# Patient Record
Sex: FEMALE | Race: WHITE | NOT HISPANIC OR LATINO | Employment: OTHER | URBAN - METROPOLITAN AREA
[De-identification: names, ages, dates, MRNs, and addresses within clinical notes are randomized per-mention and may not be internally consistent; named-entity substitution may affect disease eponyms.]

---

## 2017-01-24 ENCOUNTER — OFFICE VISIT (OUTPATIENT)
Dept: FAMILY MEDICINE CLINIC | Facility: CLINIC | Age: 77
End: 2017-01-24

## 2017-01-24 VITALS
SYSTOLIC BLOOD PRESSURE: 142 MMHG | WEIGHT: 109.2 LBS | RESPIRATION RATE: 24 BRPM | BODY MASS INDEX: 21.33 KG/M2 | TEMPERATURE: 97.1 F | DIASTOLIC BLOOD PRESSURE: 70 MMHG | HEART RATE: 88 BPM

## 2017-01-24 DIAGNOSIS — G47.00 INSOMNIA, UNSPECIFIED TYPE: ICD-10-CM

## 2017-01-24 DIAGNOSIS — J44.9 CHRONIC OBSTRUCTIVE PULMONARY DISEASE, UNSPECIFIED COPD TYPE (HCC): Primary | ICD-10-CM

## 2017-01-24 DIAGNOSIS — R53.83 OTHER FATIGUE: ICD-10-CM

## 2017-01-24 PROCEDURE — 99213 OFFICE O/P EST LOW 20 MIN: CPT | Performed by: NURSE PRACTITIONER

## 2017-01-24 RX ORDER — ALBUTEROL SULFATE 90 UG/1
2 AEROSOL, METERED RESPIRATORY (INHALATION) EVERY 4 HOURS PRN
Qty: 18 G | Refills: 11 | Status: SHIPPED | OUTPATIENT
Start: 2017-01-24 | End: 2017-04-11 | Stop reason: SDUPTHER

## 2017-01-24 NOTE — PROGRESS NOTES
Subjective   Shirley Calhoun is a 76 y.o. female.     History of Present Illness     Trying to quit smoking doing some vapor cigarettes    Needs something different for insomnia; Trazodone not helping anymore, wants to try Ambien, says she has taken it in the past.    Says she has hx of ADD  Used to take Ritalin in the past for many years but has been off of it for about 8 years  Having trouble getting things done around the house, trouble focusing    The following portions of the patient's history were reviewed and updated as appropriate: allergies, current medications, past family history, past medical history, past social history, past surgical history and problem list.    Review of Systems   Constitutional: Positive for fatigue.   HENT: Negative.    Eyes: Negative.    Respiratory: Positive for cough.    Cardiovascular: Negative.    Gastrointestinal: Negative.    Endocrine: Negative.    Genitourinary: Negative.    Musculoskeletal: Negative.    Skin: Negative.    Allergic/Immunologic: Negative.    Neurological: Negative.    Hematological: Negative.    Psychiatric/Behavioral: Positive for decreased concentration and sleep disturbance.       Objective   Physical Exam   Constitutional: She is oriented to person, place, and time. She appears well-developed and well-nourished.   HENT:   Head: Normocephalic.   Right Ear: External ear normal.   Left Ear: External ear normal.   Nose: Nose normal.   Mouth/Throat: Oropharynx is clear and moist.   Neck: Normal range of motion. Neck supple. No thyromegaly present.   Cardiovascular: Normal rate, regular rhythm and normal heart sounds.    Pulmonary/Chest: Effort normal and breath sounds normal. No respiratory distress. She has no wheezes.   Abdominal: Soft.   Lymphadenopathy:     She has no cervical adenopathy.   Neurological: She is alert and oriented to person, place, and time.   Skin: Skin is warm and dry.   Psychiatric: She has a normal mood and affect.   Nursing note and  vitals reviewed.      Assessment/Plan   Shirley was seen today for copd, insomnia and fatigue.    Diagnoses and all orders for this visit:    Chronic obstructive pulmonary disease, unspecified COPD type    Other fatigue    Insomnia, unspecified type    Other orders  -     albuterol (PROAIR HFA) 108 (90 BASE) MCG/ACT inhaler; Inhale 2 puffs Every 4 (Four) Hours As Needed for wheezing.      Gave pt samples of Dulera 100mg ii puffs twice daily since she does not have any prescription coverage.   Encouraged her to continue to pursue smoking cessation, but I do not know the long term health risks associated with vaporized nicotine devices. Cautioned her not to spill the liquid nicotine on her as she can get a large dose absorbed into her skin and keep it out of reach of small children.    Discussed with pt that she is not really a candidate for Ritalin since she is not working she is retired and not in school, we did discuss Straterra as an alternative but I feel like her sleep is more of a focus that should be addressed at this time. She says she wants to stop Trazodone and start Ambien which she has taken in the past. She has several pills in her purse so she will try these. If they help with sleep will get refills. If not will consider Amitryptylline 10mg or Mirtazapine 15mg to see if this will help her sleep.  She agrees.

## 2017-01-24 NOTE — MR AVS SNAPSHOT
Shirley Calhoun   1/24/2017 12:30 PM   Office Visit    Dept Phone:  [phone]   Encounter #:  [id number]    Provider:  LORNA Dwyer   Department:  [facility]                Your Full Care Plan              Today's Medication Changes          These changes are accurate as of: 1/24/17  1:13 PM.  If you have any questions, ask your nurse or doctor.               Stop taking medication(s)listed here:     nitrofurantoin (macrocrystal-monohydrate) 100 MG capsule   Commonly known as:  MACROBID   Stopped by:  LORNA Dwyer                Where to Get Your Medications      These medications were sent to [facility] [address] [place] [address] - [facility] AT [facility] - [phone]  - [phone] [address] [address]     Phone:  [phone]     albuterol 108 (90 BASE) MCG/ACT inhaler                  Your Updated Medication List          This list is accurate as of: 1/24/17  1:13 PM.  Always use your most recent med list.                albuterol 108 (90 BASE) MCG/ACT inhaler   Commonly known as:  PROAIR HFA   Inhale 2 puffs Every 4 (Four) Hours As Needed for wheezing.       ALEVE 220 MG tablet   Generic drug:  naproxen sodium       aspirin 81 MG tablet       Calcium 600-400 MG-UNIT chewable tablet       DULoxetine 60 MG capsule   Commonly known as:  CYMBALTA   TAKE 1 CAPSULE BY MOUTH DAILY AT BEDTIME       SYMBICORT 160-4.5 MCG/ACT inhaler   Generic drug:  budesonide-formoterol       traZODone 150 MG tablet   Commonly known as:  DESYREL   TAKE 1 AND 1/2 TABLETS BY MOUTH BEDTIME               You Were Diagnosed With        Codes Comments    Chronic obstructive pulmonary disease, unspecified COPD type    -  Primary ICD-10-CM: J44.9  ICD-9-CM: 496       Instructions     None    Patient Instructions History      Upcoming Appointments     Visit Type Date Time Department    OFFICE VISIT 1/24/2017 12:30 PM  JESUS LILLY [facility]      [employer] Signup     [facility] [employer] allows you to send messages to your doctor, view your test results, renew your prescriptions, schedule appointments, and more. To sign up, go to [employer] and click on the Sign Up Now link in the New User? box. Enter your [employer] Activation Code exactly as it appears below along with the last four digits of your Social Security Number and your Date of Birth () to complete the sign-up process. If you do not sign up before the expiration date, you must request a new code.    [employer] Activation Code: [email or web address]  Expires: 2017  1:13 PM    If you have questions, you can email [email or web address] or call [phone] to talk to our [employer] staff. Remember, [employer] is NOT to be used for urgent needs. For medical emergencies, dial 911.               Other Info from Your Visit           Allergies     No Known Allergies      Reason for Visit     COPD F/U and medication refill    Insomnia The sleeping pills aren't helping.     Fatigue           Vital Signs     Blood Pressure Pulse Temperature Respirations Weight Body Mass Index    142/70 88 97.1 °F (36.2 °C) 24 109 lb 3.2 oz (49.5 kg) 21.33 kg/m2    Smoking Status                   Current Every Day Smoker           Problems and Diagnoses Noted     Chronic airway obstruction

## 2017-02-22 DIAGNOSIS — G47.00 INSOMNIA: ICD-10-CM

## 2017-02-22 RX ORDER — TRAZODONE HYDROCHLORIDE 150 MG/1
TABLET ORAL
Qty: 45 TABLET | Refills: 5 | Status: SHIPPED | OUTPATIENT
Start: 2017-02-22 | End: 2017-04-11

## 2017-03-20 RX ORDER — DULOXETIN HYDROCHLORIDE 60 MG/1
CAPSULE, DELAYED RELEASE ORAL
Qty: 30 CAPSULE | Refills: 0 | Status: SHIPPED | OUTPATIENT
Start: 2017-03-20 | End: 2017-04-25

## 2017-04-11 ENCOUNTER — OFFICE VISIT (OUTPATIENT)
Dept: FAMILY MEDICINE CLINIC | Facility: CLINIC | Age: 77
End: 2017-04-11

## 2017-04-11 VITALS
BODY MASS INDEX: 21.4 KG/M2 | OXYGEN SATURATION: 94 % | WEIGHT: 109 LBS | HEART RATE: 84 BPM | SYSTOLIC BLOOD PRESSURE: 132 MMHG | RESPIRATION RATE: 18 BRPM | DIASTOLIC BLOOD PRESSURE: 68 MMHG | TEMPERATURE: 97.9 F | HEIGHT: 60 IN

## 2017-04-11 DIAGNOSIS — F51.01 PRIMARY INSOMNIA: Primary | ICD-10-CM

## 2017-04-11 PROCEDURE — 99213 OFFICE O/P EST LOW 20 MIN: CPT | Performed by: NURSE PRACTITIONER

## 2017-04-11 RX ORDER — ALBUTEROL SULFATE 90 UG/1
2 AEROSOL, METERED RESPIRATORY (INHALATION) EVERY 4 HOURS PRN
Qty: 18 G | Refills: 11 | Status: SHIPPED | OUTPATIENT
Start: 2017-04-11 | End: 2018-03-01 | Stop reason: SDUPTHER

## 2017-04-11 RX ORDER — MIRTAZAPINE 15 MG/1
15 TABLET, FILM COATED ORAL NIGHTLY
Qty: 30 TABLET | Refills: 3 | Status: SHIPPED | OUTPATIENT
Start: 2017-04-11 | End: 2017-04-25

## 2017-04-11 NOTE — PROGRESS NOTES
Subjective   Shirley Calhoun is a 77 y.o. female.     History of Present Illness   Insomnia  Not sleeping well, taking Trazodone nightly sometimes takes 1.5 tablets to help her stay asleep  Did not tolerate Ambien, made her crazy    Feeling anxious about having right hip replacement surgery May 1  Doesn't feel like Duloxetine is helping her mood  Feels better when the sun is shining    The following portions of the patient's history were reviewed and updated as appropriate: allergies, current medications, past family history, past medical history, past social history, past surgical history and problem list.    Review of Systems   Constitutional: Negative.    HENT: Negative.    Eyes: Negative.    Respiratory: Positive for cough, chest tightness and shortness of breath. Negative for wheezing.    Cardiovascular: Negative.  Negative for chest pain, palpitations and leg swelling.   Gastrointestinal: Negative.    Endocrine: Negative.    Genitourinary: Negative.    Musculoskeletal: Negative.    Skin: Negative.    Allergic/Immunologic: Negative.    Neurological: Negative.    Hematological: Negative.    Psychiatric/Behavioral: Positive for dysphoric mood and sleep disturbance. Negative for suicidal ideas. The patient is nervous/anxious.        Objective   Physical Exam   Constitutional: She is oriented to person, place, and time. Vital signs are normal. She appears well-developed and well-nourished. No distress.   HENT:   Head: Normocephalic.   Right Ear: Tympanic membrane, external ear and ear canal normal.   Left Ear: Tympanic membrane, external ear and ear canal normal.   Nose: Nose normal. No mucosal edema or rhinorrhea. Right sinus exhibits no maxillary sinus tenderness and no frontal sinus tenderness. Left sinus exhibits no maxillary sinus tenderness and no frontal sinus tenderness.   Mouth/Throat: Uvula is midline and mucous membranes are normal.   Eyes: Conjunctivae and lids are normal. Pupils are equal, round, and  reactive to light.   Neck: Trachea normal and normal range of motion. Neck supple. Carotid bruit is not present. No thyroid mass present.   Cardiovascular: Normal rate, regular rhythm, S1 normal, S2 normal and normal heart sounds.    Pulmonary/Chest: Effort normal and breath sounds normal.   Abdominal: Normal appearance.   Musculoskeletal: Normal range of motion.   Lymphadenopathy:        Head (right side): No tonsillar, no preauricular, no posterior auricular and no occipital adenopathy present.        Head (left side): No tonsillar, no preauricular, no posterior auricular and no occipital adenopathy present.   Neurological: She is alert and oriented to person, place, and time.   Skin: Skin is warm, dry and intact. No cyanosis. Nails show no clubbing.   Psychiatric: She has a normal mood and affect. Her speech is normal and behavior is normal. Judgment and thought content normal. Cognition and memory are normal.   Nursing note and vitals reviewed.      Assessment/Plan   Shirley was seen today for breathing problem.    Diagnoses and all orders for this visit:    Primary insomnia    Other orders  -     mirtazapine (REMERON) 15 MG tablet; Take 1 tablet by mouth Every Night.  -     Fluticasone Furoate-Vilanterol 100-25 MCG/INH aerosol powder ; Inhale 1 puff Daily.  -     albuterol (PROAIR HFA) 108 (90 BASE) MCG/ACT inhaler; Inhale 2 puffs Every 4 (Four) Hours As Needed for Wheezing.        Discontinue Trazodone and start Remeron for sleep  Do not have anymore Symbicort therefore will give pt Breo sample  Use rescue inhaler Proair when wheezing  Follow up as needed

## 2017-04-13 ENCOUNTER — OFFICE VISIT (OUTPATIENT)
Dept: FAMILY MEDICINE CLINIC | Facility: CLINIC | Age: 77
End: 2017-04-13

## 2017-04-13 VITALS
WEIGHT: 107.8 LBS | DIASTOLIC BLOOD PRESSURE: 65 MMHG | HEIGHT: 60 IN | TEMPERATURE: 98 F | BODY MASS INDEX: 21.17 KG/M2 | SYSTOLIC BLOOD PRESSURE: 118 MMHG | HEART RATE: 88 BPM | RESPIRATION RATE: 16 BRPM

## 2017-04-13 DIAGNOSIS — K59.00 CONSTIPATION, UNSPECIFIED CONSTIPATION TYPE: Primary | ICD-10-CM

## 2017-04-13 PROCEDURE — 99213 OFFICE O/P EST LOW 20 MIN: CPT | Performed by: NURSE PRACTITIONER

## 2017-04-13 NOTE — PROGRESS NOTES
Subjective   Shirley Calhoun is a 77 y.o. female.     History of Present Illness   Having issues with abdominal bloating  Having trouble having a BM this morning, + constipation  Took some Miralax this morning but just a small amount  + hx of bowel obstruction many years ago  No abdominal pain, no blood in stool, no nausea or vomiting    Feeling anxious about hip surgery that is coming up in several weeks, she is worried she is gaining too much weight  She denies shortness of breath, no swelling in ankles, feet hands or face, no CP    The following portions of the patient's history were reviewed and updated as appropriate: allergies, current medications, past family history, past medical history, past social history, past surgical history and problem list.    Review of Systems   Constitutional: Negative.    HENT: Negative.    Eyes: Negative.    Respiratory: Negative.    Cardiovascular: Negative.    Gastrointestinal: Positive for abdominal distention. Negative for abdominal pain, blood in stool, constipation, diarrhea, nausea, rectal pain and vomiting.   Endocrine: Negative.    Genitourinary: Negative.    Musculoskeletal: Negative.    Skin: Negative.    Allergic/Immunologic: Negative.    Neurological: Negative.    Hematological: Negative.    Psychiatric/Behavioral: Negative.        Objective   Physical Exam   Constitutional: She is oriented to person, place, and time. Vital signs are normal. She appears well-developed and well-nourished. No distress.   HENT:   Head: Normocephalic.   Right Ear: Tympanic membrane, external ear and ear canal normal.   Left Ear: Tympanic membrane, external ear and ear canal normal.   Nose: Nose normal. No mucosal edema or rhinorrhea. Right sinus exhibits no maxillary sinus tenderness and no frontal sinus tenderness. Left sinus exhibits no maxillary sinus tenderness and no frontal sinus tenderness.   Mouth/Throat: Uvula is midline and mucous membranes are normal.   Eyes: Lids are normal.    Neck: Trachea normal. Carotid bruit is not present. No thyroid mass present.   Cardiovascular: Normal rate, regular rhythm, S1 normal, S2 normal and normal heart sounds.    Pulmonary/Chest: Effort normal and breath sounds normal.   Abdominal: Soft. Normal appearance and bowel sounds are normal. There is no tenderness. There is no rebound and no guarding.   Musculoskeletal: Normal range of motion.   Lymphadenopathy:        Head (right side): No tonsillar, no preauricular, no posterior auricular and no occipital adenopathy present.        Head (left side): No tonsillar, no preauricular, no posterior auricular and no occipital adenopathy present.   Neurological: She is alert and oriented to person, place, and time. She has normal reflexes.   Skin: Skin is warm, dry and intact. No cyanosis. Nails show no clubbing.   Psychiatric: She has a normal mood and affect. Her speech is normal and behavior is normal. Judgment and thought content normal. Cognition and memory are normal.   Nursing note and vitals reviewed.      Assessment/Plan   Shirley was seen today for pt concerned about weight gain & bloating.    Diagnoses and all orders for this visit:    Constipation, unspecified constipation type    Recommend drinking plenty of water, Miralax OTC for constipation and prune juice  If abdominal pain, N/V or blood in stool go to ER for further evaluation; pt agrees

## 2017-04-25 ENCOUNTER — HOSPITAL ENCOUNTER (OUTPATIENT)
Dept: GENERAL RADIOLOGY | Facility: HOSPITAL | Age: 77
Discharge: HOME | End: 2017-04-25
Admitting: ORTHOPAEDIC SURGERY

## 2017-04-25 ENCOUNTER — APPOINTMENT (OUTPATIENT)
Dept: PREADMISSION TESTING | Facility: HOSPITAL | Age: 77
End: 2017-04-25

## 2017-04-25 VITALS — WEIGHT: 104.94 LBS | BODY MASS INDEX: 20.6 KG/M2 | HEIGHT: 60 IN

## 2017-04-25 DIAGNOSIS — Z01.89 LABORATORY TEST: Primary | ICD-10-CM

## 2017-04-25 LAB
ABO GROUP BLD: NORMAL
ALBUMIN SERPL-MCNC: 4.3 G/DL (ref 3.2–4.8)
ALBUMIN/GLOB SERPL: 1.5 G/DL (ref 1.5–2.5)
ALP SERPL-CCNC: 56 U/L (ref 25–100)
ALT SERPL W P-5'-P-CCNC: 12 U/L (ref 7–40)
ANION GAP SERPL CALCULATED.3IONS-SCNC: 7 MMOL/L (ref 3–11)
AST SERPL-CCNC: 24 U/L (ref 0–33)
BILIRUB SERPL-MCNC: 0.6 MG/DL (ref 0.3–1.2)
BUN BLD-MCNC: 21 MG/DL (ref 9–23)
BUN/CREAT SERPL: 35 (ref 7–25)
CALCIUM SPEC-SCNC: 9.9 MG/DL (ref 8.7–10.4)
CHLORIDE SERPL-SCNC: 103 MMOL/L (ref 99–109)
CO2 SERPL-SCNC: 29 MMOL/L (ref 20–31)
CREAT BLD-MCNC: 0.6 MG/DL (ref 0.6–1.3)
DEPRECATED RDW RBC AUTO: 47 FL (ref 37–54)
ERYTHROCYTE [DISTWIDTH] IN BLOOD BY AUTOMATED COUNT: 12.7 % (ref 11.3–14.5)
GFR SERPL CREATININE-BSD FRML MDRD: 97 ML/MIN/1.73
GLOBULIN UR ELPH-MCNC: 2.8 GM/DL
GLUCOSE BLD-MCNC: 92 MG/DL (ref 70–100)
HBA1C MFR BLD: 5 % (ref 4.8–5.6)
HCT VFR BLD AUTO: 39.6 % (ref 34.5–44)
HGB BLD-MCNC: 13.5 G/DL (ref 11.5–15.5)
MCH RBC QN AUTO: 34.7 PG (ref 27–31)
MCHC RBC AUTO-ENTMCNC: 34.1 G/DL (ref 32–36)
MCV RBC AUTO: 101.8 FL (ref 80–99)
PLATELET # BLD AUTO: 285 10*3/MM3 (ref 150–450)
PMV BLD AUTO: 9.6 FL (ref 6–12)
POTASSIUM BLD-SCNC: 4.1 MMOL/L (ref 3.5–5.5)
PROT SERPL-MCNC: 7.1 G/DL (ref 5.7–8.2)
RBC # BLD AUTO: 3.89 10*6/MM3 (ref 3.89–5.14)
RH BLD: POSITIVE
SODIUM BLD-SCNC: 139 MMOL/L (ref 132–146)
WBC NRBC COR # BLD: 6.63 10*3/MM3 (ref 3.5–10.8)

## 2017-04-25 PROCEDURE — 80053 COMPREHEN METABOLIC PANEL: CPT | Performed by: ORTHOPAEDIC SURGERY

## 2017-04-25 PROCEDURE — 85027 COMPLETE CBC AUTOMATED: CPT | Performed by: ORTHOPAEDIC SURGERY

## 2017-04-25 PROCEDURE — 93005 ELECTROCARDIOGRAM TRACING: CPT

## 2017-04-25 PROCEDURE — 86900 BLOOD TYPING SEROLOGIC ABO: CPT

## 2017-04-25 PROCEDURE — 71020 HC CHEST PA AND LATERAL: CPT

## 2017-04-25 PROCEDURE — 83036 HEMOGLOBIN GLYCOSYLATED A1C: CPT | Performed by: ORTHOPAEDIC SURGERY

## 2017-04-25 PROCEDURE — 93010 ELECTROCARDIOGRAM REPORT: CPT | Performed by: INTERNAL MEDICINE

## 2017-04-25 PROCEDURE — 86901 BLOOD TYPING SEROLOGIC RH(D): CPT

## 2017-04-25 PROCEDURE — 36415 COLL VENOUS BLD VENIPUNCTURE: CPT

## 2017-04-25 RX ORDER — UBIDECARENONE 30 MG
550 CAPSULE ORAL DAILY
COMMUNITY
End: 2021-04-01

## 2017-04-25 RX ORDER — DULOXETIN HYDROCHLORIDE 60 MG/1
60 CAPSULE, DELAYED RELEASE ORAL DAILY
COMMUNITY
End: 2017-09-14 | Stop reason: SDUPTHER

## 2017-04-25 RX ORDER — THIAMINE MONONITRATE (VIT B1) 100 MG
100 TABLET ORAL DAILY
COMMUNITY
End: 2021-04-01

## 2017-04-25 RX ORDER — LORATADINE 10 MG/1
10 TABLET ORAL AS NEEDED
COMMUNITY

## 2017-04-25 RX ORDER — TRAZODONE HYDROCHLORIDE 150 MG/1
225 TABLET ORAL NIGHTLY
COMMUNITY
End: 2017-09-14 | Stop reason: SDUPTHER

## 2017-04-25 RX ORDER — FAMOTIDINE 10 MG
10 TABLET ORAL AS NEEDED
COMMUNITY
End: 2019-01-16

## 2017-04-28 RX ORDER — DULOXETIN HYDROCHLORIDE 60 MG/1
CAPSULE, DELAYED RELEASE ORAL
Qty: 30 CAPSULE | Refills: 5 | Status: SHIPPED | OUTPATIENT
Start: 2017-04-28 | End: 2017-09-14 | Stop reason: SDUPTHER

## 2017-05-01 ENCOUNTER — HOSPITAL ENCOUNTER (INPATIENT)
Facility: HOSPITAL | Age: 77
LOS: 3 days | Discharge: HOME HEALTH | End: 2017-05-04
Attending: ORTHOPAEDIC SURGERY | Admitting: ORTHOPAEDIC SURGERY

## 2017-05-01 ENCOUNTER — ANESTHESIA (OUTPATIENT)
Dept: PERIOP | Facility: HOSPITAL | Age: 77
End: 2017-05-01

## 2017-05-01 ENCOUNTER — APPOINTMENT (OUTPATIENT)
Dept: GENERAL RADIOLOGY | Facility: HOSPITAL | Age: 77
End: 2017-05-01

## 2017-05-01 ENCOUNTER — ANESTHESIA EVENT (OUTPATIENT)
Dept: PERIOP | Facility: HOSPITAL | Age: 77
End: 2017-05-01

## 2017-05-01 DIAGNOSIS — Z74.09 IMPAIRED MOBILITY AND ADLS: ICD-10-CM

## 2017-05-01 DIAGNOSIS — Z78.9 IMPAIRED MOBILITY AND ADLS: ICD-10-CM

## 2017-05-01 DIAGNOSIS — Z74.09 IMPAIRED FUNCTIONAL MOBILITY, BALANCE, GAIT, AND ENDURANCE: Primary | ICD-10-CM

## 2017-05-01 PROBLEM — Z72.0 TOBACCO ABUSE: Status: ACTIVE | Noted: 2017-05-01

## 2017-05-01 PROBLEM — M16.11 ARTHRITIS OF RIGHT HIP: Status: ACTIVE | Noted: 2017-05-01

## 2017-05-01 LAB
ABO GROUP BLD: NORMAL
BLD GP AB SCN SERPL QL: NEGATIVE
RH BLD: POSITIVE

## 2017-05-01 PROCEDURE — 97116 GAIT TRAINING THERAPY: CPT

## 2017-05-01 PROCEDURE — 0SR904Z REPLACEMENT OF RIGHT HIP JOINT WITH CERAMIC ON POLYETHYLENE SYNTHETIC SUBSTITUTE, OPEN APPROACH: ICD-10-PCS | Performed by: ORTHOPAEDIC SURGERY

## 2017-05-01 PROCEDURE — 86850 RBC ANTIBODY SCREEN: CPT | Performed by: ORTHOPAEDIC SURGERY

## 2017-05-01 PROCEDURE — 25010000002 ROPIVACAINE PER 1 MG: Performed by: ORTHOPAEDIC SURGERY

## 2017-05-01 PROCEDURE — 25010000003 CEFAZOLIN IN DEXTROSE 2-4 GM/100ML-% SOLUTION: Performed by: ORTHOPAEDIC SURGERY

## 2017-05-01 PROCEDURE — 86901 BLOOD TYPING SEROLOGIC RH(D): CPT | Performed by: ORTHOPAEDIC SURGERY

## 2017-05-01 PROCEDURE — 25010000002 KETOROLAC TROMETHAMINE PER 15 MG: Performed by: ORTHOPAEDIC SURGERY

## 2017-05-01 PROCEDURE — 97161 PT EVAL LOW COMPLEX 20 MIN: CPT

## 2017-05-01 PROCEDURE — C1776 JOINT DEVICE (IMPLANTABLE): HCPCS | Performed by: ORTHOPAEDIC SURGERY

## 2017-05-01 PROCEDURE — 86900 BLOOD TYPING SEROLOGIC ABO: CPT | Performed by: ORTHOPAEDIC SURGERY

## 2017-05-01 PROCEDURE — 94640 AIRWAY INHALATION TREATMENT: CPT

## 2017-05-01 PROCEDURE — 76001 HC FLUORO GREATER THAN 1 HOUR: CPT

## 2017-05-01 PROCEDURE — 73502 X-RAY EXAM HIP UNI 2-3 VIEWS: CPT

## 2017-05-01 PROCEDURE — 25010000002 PHENYLEPHRINE PER 1 ML: Performed by: NURSE ANESTHETIST, CERTIFIED REGISTERED

## 2017-05-01 PROCEDURE — 25010000002 PROPOFOL 1000 MG/ML EMULSION: Performed by: NURSE ANESTHETIST, CERTIFIED REGISTERED

## 2017-05-01 PROCEDURE — C1755 CATHETER, INTRASPINAL: HCPCS | Performed by: ORTHOPAEDIC SURGERY

## 2017-05-01 PROCEDURE — 76000 FLUOROSCOPY <1 HR PHYS/QHP: CPT

## 2017-05-01 PROCEDURE — 94799 UNLISTED PULMONARY SVC/PX: CPT

## 2017-05-01 DEVICE — TOTL HIP GRIPTION CUP DEPUY UPCHRG: Type: IMPLANTABLE DEVICE | Site: HIP | Status: FUNCTIONAL

## 2017-05-01 DEVICE — ARTICUL/EZE FEMORAL HEAD DIAMETER 32MM +1 12/14 TAPER
Type: IMPLANTABLE DEVICE | Site: HIP | Status: FUNCTIONAL
Brand: ARTICUL/EZE

## 2017-05-01 DEVICE — PINNACLE CANCELLOUS BONE SCREW 6.5MM X 20MM
Type: IMPLANTABLE DEVICE | Site: HIP | Status: FUNCTIONAL
Brand: PINNACLE

## 2017-05-01 DEVICE — TOTL HIP STEM DEPUY UPCHRG: Type: IMPLANTABLE DEVICE | Site: HIP | Status: FUNCTIONAL

## 2017-05-01 DEVICE — CORAIL HIP SYSTEM CEMENTLESS FEMORAL STEM 12/14 AMT 135 DEGREES KA SIZE 11 HA COATED STANDARD COLLAR
Type: IMPLANTABLE DEVICE | Site: HIP | Status: FUNCTIONAL
Brand: CORAIL

## 2017-05-01 DEVICE — PINNACLE GRIPTION ACETABULAR SHELL SECTOR 50MM OD
Type: IMPLANTABLE DEVICE | Site: HIP | Status: FUNCTIONAL
Brand: PINNACLE GRIPTION

## 2017-05-01 DEVICE — PINNACLE HIP SOLUTIONS ALTRX POLYETHYLENE ACETABULAR LINER NEUTRAL 32MM ID 50MM OD
Type: IMPLANTABLE DEVICE | Site: HIP | Status: FUNCTIONAL
Brand: PINNACLE ALTRX

## 2017-05-01 DEVICE — TOTL HIP MOA DEPUY 9641333: Type: IMPLANTABLE DEVICE | Site: HIP | Status: FUNCTIONAL

## 2017-05-01 RX ORDER — BUPIVACAINE HYDROCHLORIDE 5 MG/ML
INJECTION, SOLUTION EPIDURAL; INTRACAUDAL AS NEEDED
Status: DISCONTINUED | OUTPATIENT
Start: 2017-05-01 | End: 2017-05-01 | Stop reason: SURG

## 2017-05-01 RX ORDER — ACETAMINOPHEN 325 MG/1
650 TABLET ORAL EVERY 4 HOURS PRN
Status: DISCONTINUED | OUTPATIENT
Start: 2017-05-01 | End: 2017-05-04 | Stop reason: HOSPADM

## 2017-05-01 RX ORDER — KETOROLAC TROMETHAMINE 15 MG/ML
15 INJECTION, SOLUTION INTRAMUSCULAR; INTRAVENOUS EVERY 6 HOURS PRN
Status: DISPENSED | OUTPATIENT
Start: 2017-05-01 | End: 2017-05-02

## 2017-05-01 RX ORDER — HYDROCODONE BITARTRATE AND ACETAMINOPHEN 5; 325 MG/1; MG/1
1 TABLET ORAL EVERY 4 HOURS PRN
Status: DISCONTINUED | OUTPATIENT
Start: 2017-05-01 | End: 2017-05-03

## 2017-05-01 RX ORDER — BISACODYL 5 MG/1
10 TABLET, DELAYED RELEASE ORAL DAILY PRN
Status: DISCONTINUED | OUTPATIENT
Start: 2017-05-01 | End: 2017-05-04 | Stop reason: HOSPADM

## 2017-05-01 RX ORDER — HYDRALAZINE HYDROCHLORIDE 20 MG/ML
10 INJECTION INTRAMUSCULAR; INTRAVENOUS EVERY 6 HOURS PRN
Status: DISCONTINUED | OUTPATIENT
Start: 2017-05-01 | End: 2017-05-04 | Stop reason: HOSPADM

## 2017-05-01 RX ORDER — FOLIC ACID 1 MG/1
1 TABLET ORAL DAILY
Status: DISCONTINUED | OUTPATIENT
Start: 2017-05-01 | End: 2017-05-04 | Stop reason: HOSPADM

## 2017-05-01 RX ORDER — ONDANSETRON 2 MG/ML
4 INJECTION INTRAMUSCULAR; INTRAVENOUS ONCE AS NEEDED
Status: DISCONTINUED | OUTPATIENT
Start: 2017-05-01 | End: 2017-05-01 | Stop reason: HOSPADM

## 2017-05-01 RX ORDER — MAGNESIUM HYDROXIDE 1200 MG/15ML
LIQUID ORAL AS NEEDED
Status: DISCONTINUED | OUTPATIENT
Start: 2017-05-01 | End: 2017-05-01 | Stop reason: HOSPADM

## 2017-05-01 RX ORDER — NICOTINE 21 MG/24HR
1 PATCH, TRANSDERMAL 24 HOURS TRANSDERMAL EVERY 24 HOURS
Status: DISCONTINUED | OUTPATIENT
Start: 2017-05-01 | End: 2017-05-04 | Stop reason: HOSPADM

## 2017-05-01 RX ORDER — FENTANYL CITRATE 50 UG/ML
50 INJECTION, SOLUTION INTRAMUSCULAR; INTRAVENOUS
Status: DISCONTINUED | OUTPATIENT
Start: 2017-05-01 | End: 2017-05-01 | Stop reason: HOSPADM

## 2017-05-01 RX ORDER — SODIUM CHLORIDE 0.9 % (FLUSH) 0.9 %
1-10 SYRINGE (ML) INJECTION AS NEEDED
Status: DISCONTINUED | OUTPATIENT
Start: 2017-05-01 | End: 2017-05-01 | Stop reason: HOSPADM

## 2017-05-01 RX ORDER — MEPERIDINE HYDROCHLORIDE 25 MG/ML
12.5 INJECTION INTRAMUSCULAR; INTRAVENOUS; SUBCUTANEOUS
Status: DISCONTINUED | OUTPATIENT
Start: 2017-05-01 | End: 2017-05-01 | Stop reason: HOSPADM

## 2017-05-01 RX ORDER — ONDANSETRON 2 MG/ML
4 INJECTION INTRAMUSCULAR; INTRAVENOUS EVERY 6 HOURS PRN
Status: DISCONTINUED | OUTPATIENT
Start: 2017-05-01 | End: 2017-05-04 | Stop reason: HOSPADM

## 2017-05-01 RX ORDER — BUDESONIDE AND FORMOTEROL FUMARATE DIHYDRATE 80; 4.5 UG/1; UG/1
2 AEROSOL RESPIRATORY (INHALATION)
Status: DISCONTINUED | OUTPATIENT
Start: 2017-05-01 | End: 2017-05-04 | Stop reason: HOSPADM

## 2017-05-01 RX ORDER — ACETAMINOPHEN 325 MG/1
650 TABLET ORAL ONCE
Status: COMPLETED | OUTPATIENT
Start: 2017-05-01 | End: 2017-05-01

## 2017-05-01 RX ORDER — ROPIVACAINE HYDROCHLORIDE 5 MG/ML
INJECTION, SOLUTION EPIDURAL; INFILTRATION; PERINEURAL AS NEEDED
Status: DISCONTINUED | OUTPATIENT
Start: 2017-05-01 | End: 2017-05-01 | Stop reason: HOSPADM

## 2017-05-01 RX ORDER — BISACODYL 10 MG
10 SUPPOSITORY, RECTAL RECTAL DAILY PRN
Status: DISCONTINUED | OUTPATIENT
Start: 2017-05-01 | End: 2017-05-04 | Stop reason: HOSPADM

## 2017-05-01 RX ORDER — CETIRIZINE HYDROCHLORIDE 10 MG/1
5 TABLET ORAL DAILY
Status: DISCONTINUED | OUTPATIENT
Start: 2017-05-01 | End: 2017-05-04 | Stop reason: HOSPADM

## 2017-05-01 RX ORDER — CELECOXIB 200 MG/1
200 CAPSULE ORAL ONCE
Status: COMPLETED | OUTPATIENT
Start: 2017-05-01 | End: 2017-05-01

## 2017-05-01 RX ORDER — CEFAZOLIN SODIUM 2 G/100ML
2 INJECTION, SOLUTION INTRAVENOUS ONCE
Status: COMPLETED | OUTPATIENT
Start: 2017-05-01 | End: 2017-05-01

## 2017-05-01 RX ORDER — BUPIVACAINE HYDROCHLORIDE AND EPINEPHRINE 2.5; 5 MG/ML; UG/ML
INJECTION, SOLUTION EPIDURAL; INFILTRATION; INTRACAUDAL; PERINEURAL AS NEEDED
Status: DISCONTINUED | OUTPATIENT
Start: 2017-05-01 | End: 2017-05-01 | Stop reason: HOSPADM

## 2017-05-01 RX ORDER — ASPIRIN 325 MG
325 TABLET, DELAYED RELEASE (ENTERIC COATED) ORAL DAILY
Status: DISCONTINUED | OUTPATIENT
Start: 2017-05-02 | End: 2017-05-04 | Stop reason: HOSPADM

## 2017-05-01 RX ORDER — TRANEXAMIC ACID 100 MG/ML
INJECTION, SOLUTION INTRAVENOUS AS NEEDED
Status: DISCONTINUED | OUTPATIENT
Start: 2017-05-01 | End: 2017-05-01 | Stop reason: SURG

## 2017-05-01 RX ORDER — SODIUM CHLORIDE, SODIUM LACTATE, POTASSIUM CHLORIDE, CALCIUM CHLORIDE 600; 310; 30; 20 MG/100ML; MG/100ML; MG/100ML; MG/100ML
100 INJECTION, SOLUTION INTRAVENOUS CONTINUOUS
Status: DISCONTINUED | OUTPATIENT
Start: 2017-05-01 | End: 2017-05-02 | Stop reason: SDUPTHER

## 2017-05-01 RX ORDER — FAMOTIDINE 20 MG/1
20 TABLET, FILM COATED ORAL ONCE
Status: COMPLETED | OUTPATIENT
Start: 2017-05-01 | End: 2017-05-01

## 2017-05-01 RX ORDER — CEFAZOLIN SODIUM 2 G/100ML
2 INJECTION, SOLUTION INTRAVENOUS EVERY 8 HOURS
Status: COMPLETED | OUTPATIENT
Start: 2017-05-01 | End: 2017-05-02

## 2017-05-01 RX ORDER — ACETAMINOPHEN 650 MG
TABLET, EXTENDED RELEASE ORAL AS NEEDED
Status: DISCONTINUED | OUTPATIENT
Start: 2017-05-01 | End: 2017-05-01 | Stop reason: HOSPADM

## 2017-05-01 RX ORDER — FAMOTIDINE 20 MG/1
10 TABLET, FILM COATED ORAL AS NEEDED
Status: DISCONTINUED | OUTPATIENT
Start: 2017-05-01 | End: 2017-05-04 | Stop reason: HOSPADM

## 2017-05-01 RX ORDER — SODIUM CHLORIDE 0.9 % (FLUSH) 0.9 %
1-10 SYRINGE (ML) INJECTION AS NEEDED
Status: DISCONTINUED | OUTPATIENT
Start: 2017-05-01 | End: 2017-05-04 | Stop reason: HOSPADM

## 2017-05-01 RX ORDER — SODIUM CHLORIDE, SODIUM LACTATE, POTASSIUM CHLORIDE, CALCIUM CHLORIDE 600; 310; 30; 20 MG/100ML; MG/100ML; MG/100ML; MG/100ML
9 INJECTION, SOLUTION INTRAVENOUS CONTINUOUS
Status: DISCONTINUED | OUTPATIENT
Start: 2017-05-01 | End: 2017-05-04 | Stop reason: HOSPADM

## 2017-05-01 RX ORDER — THIAMINE MONONITRATE (VIT B1) 100 MG
100 TABLET ORAL DAILY
Status: DISCONTINUED | OUTPATIENT
Start: 2017-05-01 | End: 2017-05-04 | Stop reason: HOSPADM

## 2017-05-01 RX ORDER — LIDOCAINE HYDROCHLORIDE 10 MG/ML
5 INJECTION, SOLUTION EPIDURAL; INFILTRATION; INTRACAUDAL; PERINEURAL ONCE
Status: COMPLETED | OUTPATIENT
Start: 2017-05-01 | End: 2017-05-01

## 2017-05-01 RX ORDER — SODIUM CHLORIDE 9 MG/ML
150 INJECTION, SOLUTION INTRAVENOUS CONTINUOUS
Status: DISCONTINUED | OUTPATIENT
Start: 2017-05-01 | End: 2017-05-04 | Stop reason: HOSPADM

## 2017-05-01 RX ORDER — LORAZEPAM 2 MG/ML
0.5 INJECTION INTRAMUSCULAR EVERY 4 HOURS PRN
Status: DISCONTINUED | OUTPATIENT
Start: 2017-05-01 | End: 2017-05-04 | Stop reason: HOSPADM

## 2017-05-01 RX ORDER — DOCUSATE SODIUM 100 MG/1
100 CAPSULE, LIQUID FILLED ORAL 2 TIMES DAILY
Status: DISCONTINUED | OUTPATIENT
Start: 2017-05-01 | End: 2017-05-04 | Stop reason: HOSPADM

## 2017-05-01 RX ORDER — DULOXETIN HYDROCHLORIDE 60 MG/1
60 CAPSULE, DELAYED RELEASE ORAL DAILY
Status: DISCONTINUED | OUTPATIENT
Start: 2017-05-01 | End: 2017-05-04 | Stop reason: HOSPADM

## 2017-05-01 RX ORDER — PREGABALIN 75 MG/1
75 CAPSULE ORAL ONCE
Status: COMPLETED | OUTPATIENT
Start: 2017-05-01 | End: 2017-05-01

## 2017-05-01 RX ADMIN — TRANEXAMIC ACID 472 MG: 100 INJECTION, SOLUTION INTRAVENOUS at 13:50

## 2017-05-01 RX ADMIN — PHENYLEPHRINE HYDROCHLORIDE 100 MCG: 10 INJECTION INTRAVENOUS at 14:20

## 2017-05-01 RX ADMIN — TRAZODONE HYDROCHLORIDE 50 MG: 100 TABLET ORAL at 20:23

## 2017-05-01 RX ADMIN — FAMOTIDINE 20 MG: 20 TABLET ORAL at 10:55

## 2017-05-01 RX ADMIN — DULOXETINE 60 MG: 60 CAPSULE, DELAYED RELEASE ORAL at 17:49

## 2017-05-01 RX ADMIN — ACETAMINOPHEN 650 MG: 325 TABLET, FILM COATED ORAL at 11:10

## 2017-05-01 RX ADMIN — PREGABALIN 75 MG: 75 CAPSULE ORAL at 11:10

## 2017-05-01 RX ADMIN — CEFAZOLIN SODIUM 2 G: 2 INJECTION, SOLUTION INTRAVENOUS at 13:40

## 2017-05-01 RX ADMIN — TRANEXAMIC ACID 472 MG: 100 INJECTION, SOLUTION INTRAVENOUS at 15:00

## 2017-05-01 RX ADMIN — SODIUM CHLORIDE 150 ML/HR: 9 INJECTION, SOLUTION INTRAVENOUS at 17:02

## 2017-05-01 RX ADMIN — SODIUM CHLORIDE, POTASSIUM CHLORIDE, SODIUM LACTATE AND CALCIUM CHLORIDE 9 ML/HR: 600; 310; 30; 20 INJECTION, SOLUTION INTRAVENOUS at 10:49

## 2017-05-01 RX ADMIN — SODIUM CHLORIDE, POTASSIUM CHLORIDE, SODIUM LACTATE AND CALCIUM CHLORIDE: 600; 310; 30; 20 INJECTION, SOLUTION INTRAVENOUS at 13:40

## 2017-05-01 RX ADMIN — CEFAZOLIN SODIUM 2 G: 2 INJECTION, SOLUTION INTRAVENOUS at 21:56

## 2017-05-01 RX ADMIN — HYDROCODONE BITARTRATE AND ACETAMINOPHEN 1 TABLET: 5; 325 TABLET ORAL at 20:22

## 2017-05-01 RX ADMIN — PHENYLEPHRINE HYDROCHLORIDE 100 MCG: 10 INJECTION INTRAVENOUS at 14:57

## 2017-05-01 RX ADMIN — DOCUSATE SODIUM 100 MG: 100 CAPSULE, LIQUID FILLED ORAL at 17:49

## 2017-05-01 RX ADMIN — Medication 100 MG: at 20:22

## 2017-05-01 RX ADMIN — BUPIVACAINE HYDROCHLORIDE 2.5 ML: 5 INJECTION, SOLUTION EPIDURAL; INTRACAUDAL; PERINEURAL at 13:48

## 2017-05-01 RX ADMIN — SODIUM CHLORIDE 150 ML/HR: 9 INJECTION, SOLUTION INTRAVENOUS at 23:39

## 2017-05-01 RX ADMIN — FOLIC ACID 1 MG: 1 TABLET ORAL at 20:22

## 2017-05-01 RX ADMIN — CETIRIZINE HYDROCHLORIDE 5 MG: 10 TABLET, FILM COATED ORAL at 17:51

## 2017-05-01 RX ADMIN — TRAZODONE HYDROCHLORIDE 175 MG: 100 TABLET ORAL at 20:40

## 2017-05-01 RX ADMIN — PHENYLEPHRINE HYDROCHLORIDE 100 MCG: 10 INJECTION INTRAVENOUS at 14:29

## 2017-05-01 RX ADMIN — PHENYLEPHRINE HYDROCHLORIDE 100 MCG: 10 INJECTION INTRAVENOUS at 14:25

## 2017-05-01 RX ADMIN — PHENYLEPHRINE HYDROCHLORIDE 100 MCG: 10 INJECTION INTRAVENOUS at 13:58

## 2017-05-01 RX ADMIN — HYDROCODONE BITARTRATE AND ACETAMINOPHEN 1 TABLET: 5; 325 TABLET ORAL at 23:39

## 2017-05-01 RX ADMIN — NICOTINE 1 PATCH: 21 PATCH, EXTENDED RELEASE TRANSDERMAL at 17:51

## 2017-05-01 RX ADMIN — BUDESONIDE AND FORMOTEROL FUMARATE DIHYDRATE 2 PUFF: 80; 4.5 AEROSOL RESPIRATORY (INHALATION) at 21:10

## 2017-05-01 RX ADMIN — MUPIROCIN: 20 OINTMENT TOPICAL at 11:10

## 2017-05-01 RX ADMIN — KETOROLAC TROMETHAMINE 15 MG: 15 INJECTION, SOLUTION INTRAMUSCULAR; INTRAVENOUS at 21:43

## 2017-05-01 RX ADMIN — CELECOXIB 200 MG: 200 CAPSULE ORAL at 11:18

## 2017-05-01 RX ADMIN — LIDOCAINE HYDROCHLORIDE 0.2 ML: 10 INJECTION, SOLUTION EPIDURAL; INFILTRATION; INTRACAUDAL; PERINEURAL at 10:49

## 2017-05-01 RX ADMIN — PHENYLEPHRINE HYDROCHLORIDE 100 MCG: 10 INJECTION INTRAVENOUS at 14:14

## 2017-05-01 RX ADMIN — PROPOFOL 120 MCG/KG/MIN: 10 INJECTION, EMULSION INTRAVENOUS at 13:50

## 2017-05-02 PROBLEM — Z96.641 STATUS POST TOTAL REPLACEMENT OF RIGHT HIP: Status: ACTIVE | Noted: 2017-05-02

## 2017-05-02 PROBLEM — D62 ACUTE BLOOD LOSS ANEMIA: Status: ACTIVE | Noted: 2017-05-02

## 2017-05-02 PROBLEM — F10.10 ALCOHOL ABUSE, DAILY USE: Status: ACTIVE | Noted: 2017-05-02

## 2017-05-02 PROBLEM — G89.18 ACUTE POST-OPERATIVE PAIN: Status: ACTIVE | Noted: 2017-05-02

## 2017-05-02 LAB
ANION GAP SERPL CALCULATED.3IONS-SCNC: 3 MMOL/L (ref 3–11)
BASOPHILS # BLD AUTO: 0.02 10*3/MM3 (ref 0–0.2)
BASOPHILS NFR BLD AUTO: 0.2 % (ref 0–1)
BUN BLD-MCNC: 12 MG/DL (ref 9–23)
BUN/CREAT SERPL: 24 (ref 7–25)
CALCIUM SPEC-SCNC: 8.2 MG/DL (ref 8.7–10.4)
CHLORIDE SERPL-SCNC: 109 MMOL/L (ref 99–109)
CO2 SERPL-SCNC: 27 MMOL/L (ref 20–31)
CREAT BLD-MCNC: 0.5 MG/DL (ref 0.6–1.3)
DEPRECATED RDW RBC AUTO: 48.2 FL (ref 37–54)
EOSINOPHIL # BLD AUTO: 0.11 10*3/MM3 (ref 0.1–0.3)
EOSINOPHIL NFR BLD AUTO: 1.3 % (ref 0–3)
ERYTHROCYTE [DISTWIDTH] IN BLOOD BY AUTOMATED COUNT: 12.7 % (ref 11.3–14.5)
GFR SERPL CREATININE-BSD FRML MDRD: 120 ML/MIN/1.73
GLUCOSE BLD-MCNC: 106 MG/DL (ref 70–100)
HCT VFR BLD AUTO: 28.4 % (ref 34.5–44)
HGB BLD-MCNC: 9.4 G/DL (ref 11.5–15.5)
IMM GRANULOCYTES # BLD: 0.02 10*3/MM3 (ref 0–0.03)
IMM GRANULOCYTES NFR BLD: 0.2 % (ref 0–0.6)
LYMPHOCYTES # BLD AUTO: 0.77 10*3/MM3 (ref 0.6–4.8)
LYMPHOCYTES NFR BLD AUTO: 9.3 % (ref 24–44)
MCH RBC QN AUTO: 34.3 PG (ref 27–31)
MCHC RBC AUTO-ENTMCNC: 33.1 G/DL (ref 32–36)
MCV RBC AUTO: 103.6 FL (ref 80–99)
MONOCYTES # BLD AUTO: 1.03 10*3/MM3 (ref 0–1)
MONOCYTES NFR BLD AUTO: 12.5 % (ref 0–12)
NEUTROPHILS # BLD AUTO: 6.3 10*3/MM3 (ref 1.5–8.3)
NEUTROPHILS NFR BLD AUTO: 76.5 % (ref 41–71)
PLATELET # BLD AUTO: 209 10*3/MM3 (ref 150–450)
PMV BLD AUTO: 10 FL (ref 6–12)
POTASSIUM BLD-SCNC: 3.7 MMOL/L (ref 3.5–5.5)
RBC # BLD AUTO: 2.74 10*6/MM3 (ref 3.89–5.14)
SODIUM BLD-SCNC: 139 MMOL/L (ref 132–146)
WBC NRBC COR # BLD: 8.25 10*3/MM3 (ref 3.5–10.8)

## 2017-05-02 PROCEDURE — 80048 BASIC METABOLIC PNL TOTAL CA: CPT | Performed by: NURSE PRACTITIONER

## 2017-05-02 PROCEDURE — 97530 THERAPEUTIC ACTIVITIES: CPT | Performed by: OCCUPATIONAL THERAPIST

## 2017-05-02 PROCEDURE — 97110 THERAPEUTIC EXERCISES: CPT

## 2017-05-02 PROCEDURE — 94640 AIRWAY INHALATION TREATMENT: CPT

## 2017-05-02 PROCEDURE — 97116 GAIT TRAINING THERAPY: CPT

## 2017-05-02 PROCEDURE — 85025 COMPLETE CBC W/AUTO DIFF WBC: CPT | Performed by: ORTHOPAEDIC SURGERY

## 2017-05-02 PROCEDURE — 25010000002 KETOROLAC TROMETHAMINE PER 15 MG: Performed by: ORTHOPAEDIC SURGERY

## 2017-05-02 PROCEDURE — 94799 UNLISTED PULMONARY SVC/PX: CPT

## 2017-05-02 PROCEDURE — 97166 OT EVAL MOD COMPLEX 45 MIN: CPT | Performed by: OCCUPATIONAL THERAPIST

## 2017-05-02 PROCEDURE — 25010000003 CEFAZOLIN IN DEXTROSE 2-4 GM/100ML-% SOLUTION: Performed by: ORTHOPAEDIC SURGERY

## 2017-05-02 RX ADMIN — DOCUSATE SODIUM 100 MG: 100 CAPSULE, LIQUID FILLED ORAL at 17:31

## 2017-05-02 RX ADMIN — BUDESONIDE AND FORMOTEROL FUMARATE DIHYDRATE 2 PUFF: 80; 4.5 AEROSOL RESPIRATORY (INHALATION) at 09:02

## 2017-05-02 RX ADMIN — ASPIRIN 325 MG: 325 TABLET, DELAYED RELEASE ORAL at 08:18

## 2017-05-02 RX ADMIN — BUDESONIDE AND FORMOTEROL FUMARATE DIHYDRATE 2 PUFF: 80; 4.5 AEROSOL RESPIRATORY (INHALATION) at 21:06

## 2017-05-02 RX ADMIN — NICOTINE 1 PATCH: 21 PATCH, EXTENDED RELEASE TRANSDERMAL at 17:31

## 2017-05-02 RX ADMIN — DULOXETINE 60 MG: 60 CAPSULE, DELAYED RELEASE ORAL at 08:19

## 2017-05-02 RX ADMIN — KETOROLAC TROMETHAMINE 15 MG: 15 INJECTION, SOLUTION INTRAMUSCULAR; INTRAVENOUS at 04:13

## 2017-05-02 RX ADMIN — FOLIC ACID 1 MG: 1 TABLET ORAL at 08:19

## 2017-05-02 RX ADMIN — CETIRIZINE HYDROCHLORIDE 5 MG: 10 TABLET, FILM COATED ORAL at 08:19

## 2017-05-02 RX ADMIN — TRAZODONE HYDROCHLORIDE 225 MG: 100 TABLET ORAL at 21:30

## 2017-05-02 RX ADMIN — HYDROCODONE BITARTRATE AND ACETAMINOPHEN 1 TABLET: 5; 325 TABLET ORAL at 17:35

## 2017-05-02 RX ADMIN — HYDROCODONE BITARTRATE AND ACETAMINOPHEN 1 TABLET: 5; 325 TABLET ORAL at 03:40

## 2017-05-02 RX ADMIN — DOCUSATE SODIUM 100 MG: 100 CAPSULE, LIQUID FILLED ORAL at 08:18

## 2017-05-02 RX ADMIN — HYDROCODONE BITARTRATE AND ACETAMINOPHEN 1 TABLET: 5; 325 TABLET ORAL at 08:26

## 2017-05-02 RX ADMIN — HYDROCODONE BITARTRATE AND ACETAMINOPHEN 1 TABLET: 5; 325 TABLET ORAL at 13:06

## 2017-05-02 RX ADMIN — SODIUM CHLORIDE 150 ML/HR: 9 INJECTION, SOLUTION INTRAVENOUS at 06:14

## 2017-05-02 RX ADMIN — CEFAZOLIN SODIUM 2 G: 2 INJECTION, SOLUTION INTRAVENOUS at 05:34

## 2017-05-02 RX ADMIN — Medication 100 MG: at 08:19

## 2017-05-02 RX ADMIN — HYDROCODONE BITARTRATE AND ACETAMINOPHEN 1 TABLET: 5; 325 TABLET ORAL at 21:29

## 2017-05-03 PROCEDURE — 97116 GAIT TRAINING THERAPY: CPT

## 2017-05-03 PROCEDURE — 94640 AIRWAY INHALATION TREATMENT: CPT

## 2017-05-03 PROCEDURE — 97110 THERAPEUTIC EXERCISES: CPT

## 2017-05-03 PROCEDURE — 94799 UNLISTED PULMONARY SVC/PX: CPT

## 2017-05-03 PROCEDURE — 97530 THERAPEUTIC ACTIVITIES: CPT | Performed by: OCCUPATIONAL THERAPIST

## 2017-05-03 RX ORDER — HYDROCODONE BITARTRATE AND ACETAMINOPHEN 7.5; 325 MG/1; MG/1
1 TABLET ORAL EVERY 4 HOURS PRN
Status: DISCONTINUED | OUTPATIENT
Start: 2017-05-03 | End: 2017-05-04 | Stop reason: HOSPADM

## 2017-05-03 RX ADMIN — ASPIRIN 325 MG: 325 TABLET, DELAYED RELEASE ORAL at 08:32

## 2017-05-03 RX ADMIN — HYDROCODONE BITARTRATE AND ACETAMINOPHEN 1 TABLET: 7.5; 325 TABLET ORAL at 11:46

## 2017-05-03 RX ADMIN — MAGNESIUM HYDROXIDE 10 ML: 2400 SUSPENSION ORAL at 10:35

## 2017-05-03 RX ADMIN — HYDROCODONE BITARTRATE AND ACETAMINOPHEN 1 TABLET: 7.5; 325 TABLET ORAL at 19:08

## 2017-05-03 RX ADMIN — DULOXETINE 60 MG: 60 CAPSULE, DELAYED RELEASE ORAL at 08:32

## 2017-05-03 RX ADMIN — DOCUSATE SODIUM 100 MG: 100 CAPSULE, LIQUID FILLED ORAL at 17:58

## 2017-05-03 RX ADMIN — BISACODYL 10 MG: 5 TABLET, COATED ORAL at 10:35

## 2017-05-03 RX ADMIN — CETIRIZINE HYDROCHLORIDE 5 MG: 10 TABLET, FILM COATED ORAL at 08:32

## 2017-05-03 RX ADMIN — FOLIC ACID 1 MG: 1 TABLET ORAL at 08:32

## 2017-05-03 RX ADMIN — BUDESONIDE AND FORMOTEROL FUMARATE DIHYDRATE 2 PUFF: 80; 4.5 AEROSOL RESPIRATORY (INHALATION) at 20:59

## 2017-05-03 RX ADMIN — DOCUSATE SODIUM 100 MG: 100 CAPSULE, LIQUID FILLED ORAL at 08:32

## 2017-05-03 RX ADMIN — BUDESONIDE AND FORMOTEROL FUMARATE DIHYDRATE 2 PUFF: 80; 4.5 AEROSOL RESPIRATORY (INHALATION) at 09:23

## 2017-05-03 RX ADMIN — NICOTINE 1 PATCH: 21 PATCH, EXTENDED RELEASE TRANSDERMAL at 17:58

## 2017-05-03 RX ADMIN — HYDROCODONE BITARTRATE AND ACETAMINOPHEN 1 TABLET: 5; 325 TABLET ORAL at 07:53

## 2017-05-03 RX ADMIN — TRAZODONE HYDROCHLORIDE 225 MG: 100 TABLET ORAL at 20:45

## 2017-05-03 RX ADMIN — Medication 100 MG: at 08:32

## 2017-05-04 VITALS
BODY MASS INDEX: 20.42 KG/M2 | HEIGHT: 60 IN | HEART RATE: 96 BPM | TEMPERATURE: 97.7 F | RESPIRATION RATE: 16 BRPM | DIASTOLIC BLOOD PRESSURE: 54 MMHG | SYSTOLIC BLOOD PRESSURE: 134 MMHG | OXYGEN SATURATION: 91 % | WEIGHT: 104 LBS

## 2017-05-04 PROCEDURE — 97110 THERAPEUTIC EXERCISES: CPT

## 2017-05-04 PROCEDURE — 94640 AIRWAY INHALATION TREATMENT: CPT

## 2017-05-04 PROCEDURE — 97116 GAIT TRAINING THERAPY: CPT

## 2017-05-04 RX ORDER — HYDROCODONE BITARTRATE AND ACETAMINOPHEN 7.5; 325 MG/1; MG/1
1 TABLET ORAL EVERY 4 HOURS PRN
Qty: 40 TABLET | Refills: 0 | Status: SHIPPED | OUTPATIENT
Start: 2017-05-04 | End: 2017-05-13

## 2017-05-04 RX ORDER — PSEUDOEPHEDRINE HCL 30 MG
100 TABLET ORAL 2 TIMES DAILY
Qty: 60 CAPSULE | Refills: 0 | Status: SHIPPED | OUTPATIENT
Start: 2017-05-04 | End: 2021-04-01

## 2017-05-04 RX ADMIN — ACETAMINOPHEN 650 MG: 325 TABLET ORAL at 05:55

## 2017-05-04 RX ADMIN — DULOXETINE 60 MG: 60 CAPSULE, DELAYED RELEASE ORAL at 08:16

## 2017-05-04 RX ADMIN — DOCUSATE SODIUM 100 MG: 100 CAPSULE, LIQUID FILLED ORAL at 08:16

## 2017-05-04 RX ADMIN — HYDROCODONE BITARTRATE AND ACETAMINOPHEN 1 TABLET: 7.5; 325 TABLET ORAL at 08:19

## 2017-05-04 RX ADMIN — BUDESONIDE AND FORMOTEROL FUMARATE DIHYDRATE 2 PUFF: 80; 4.5 AEROSOL RESPIRATORY (INHALATION) at 09:07

## 2017-05-04 RX ADMIN — ACETAMINOPHEN 650 MG: 325 TABLET ORAL at 00:17

## 2017-05-04 RX ADMIN — ASPIRIN 325 MG: 325 TABLET, DELAYED RELEASE ORAL at 08:16

## 2017-05-04 RX ADMIN — FOLIC ACID 1 MG: 1 TABLET ORAL at 08:16

## 2017-05-04 RX ADMIN — CETIRIZINE HYDROCHLORIDE 5 MG: 10 TABLET, FILM COATED ORAL at 08:16

## 2017-05-04 RX ADMIN — Medication 100 MG: at 08:16

## 2017-05-05 ENCOUNTER — TRANSITIONAL CARE MANAGEMENT TELEPHONE ENCOUNTER (OUTPATIENT)
Dept: FAMILY MEDICINE CLINIC | Facility: CLINIC | Age: 77
End: 2017-05-05

## 2017-09-04 DIAGNOSIS — G47.00 INSOMNIA: ICD-10-CM

## 2017-09-05 RX ORDER — TRAZODONE HYDROCHLORIDE 150 MG/1
TABLET ORAL
Qty: 135 TABLET | Refills: 3 | Status: SHIPPED | OUTPATIENT
Start: 2017-09-05 | End: 2018-07-16 | Stop reason: SDUPTHER

## 2017-09-14 ENCOUNTER — OFFICE VISIT (OUTPATIENT)
Dept: FAMILY MEDICINE CLINIC | Facility: CLINIC | Age: 77
End: 2017-09-14

## 2017-09-14 VITALS
SYSTOLIC BLOOD PRESSURE: 122 MMHG | DIASTOLIC BLOOD PRESSURE: 64 MMHG | HEIGHT: 60 IN | HEART RATE: 77 BPM | TEMPERATURE: 97.4 F | WEIGHT: 99 LBS | OXYGEN SATURATION: 99 % | RESPIRATION RATE: 16 BRPM | BODY MASS INDEX: 19.44 KG/M2

## 2017-09-14 DIAGNOSIS — Z96.641 STATUS POST TOTAL REPLACEMENT OF RIGHT HIP: Primary | ICD-10-CM

## 2017-09-14 DIAGNOSIS — J44.9 CHRONIC OBSTRUCTIVE PULMONARY DISEASE, UNSPECIFIED COPD TYPE (HCC): ICD-10-CM

## 2017-09-14 DIAGNOSIS — F34.1 DYSTHYMIA: ICD-10-CM

## 2017-09-14 PROCEDURE — 99214 OFFICE O/P EST MOD 30 MIN: CPT | Performed by: NURSE PRACTITIONER

## 2017-09-14 RX ORDER — MIRTAZAPINE 15 MG/1
15 TABLET, FILM COATED ORAL NIGHTLY
COMMUNITY
End: 2017-09-14

## 2017-09-14 RX ORDER — DULOXETIN HYDROCHLORIDE 60 MG/1
60 CAPSULE, DELAYED RELEASE ORAL DAILY
Qty: 30 CAPSULE | Refills: 6 | Status: SHIPPED | OUTPATIENT
Start: 2017-09-14 | End: 2018-05-20 | Stop reason: SDUPTHER

## 2017-09-14 NOTE — PROGRESS NOTES
Subjective   Shirley Calhoun is a 77 y.o. female.     History of Present Illness   COPD  Cough and chest congestion, she has run out of her inhalers (Symbicort or Dulera) has a lot of white mucus  Feeling really tired even after waking in the morning; no difficulty breathing but feeling tightness and coughing a lot    S/P Hip replacement  She is still recovering from her right hip replacement, she was doing PT in her home but had to quit since she is able to drive. She says it was harder than she anticipated to recover and she continues to have some pain and muscle atrophy of her upper thigh. She has some knee pain where she had been compensating for the pain. She is wearing a knee brace. She says she is doing her exercises but she did better when she was in therapy pool. The cool damp weather really bothers her causing her more pain.     Depression  She is struggling with depression, she has discontinued the Duloxetine  She is drinking more alcohol than she really should.   Admits to Isolating herself, sleeping more, eating less, poor appetite and weight loss  Her daughter is being nice now. She had lost 2 of her 3 children and her  left after her 3 yo . She does have a dog in her home Andreas that she has to take care of      The following portions of the patient's history were reviewed and updated as appropriate: allergies, current medications, past family history, past medical history, past social history, past surgical history and problem list.    Review of Systems   Constitutional: Positive for fatigue.   HENT: Negative for congestion and sore throat.    Eyes: Negative.    Respiratory: Positive for cough, chest tightness and shortness of breath. Negative for wheezing.    Cardiovascular: Negative.    Gastrointestinal: Positive for constipation.   Endocrine: Negative.    Genitourinary: Negative.    Musculoskeletal: Positive for arthralgias, gait problem and myalgias.   Neurological: Negative for  dizziness and light-headedness.   Psychiatric/Behavioral: Positive for dysphoric mood and sleep disturbance. Negative for confusion and suicidal ideas.       Objective   Physical Exam   Constitutional: She is oriented to person, place, and time. She appears well-developed and well-nourished.   HENT:   Head: Normocephalic.   Cardiovascular: Normal rate, regular rhythm and normal heart sounds.    Pulmonary/Chest: She has no wheezes. She has no rales.   Musculoskeletal: She exhibits tenderness.   Neurological: She is alert and oriented to person, place, and time.   Skin: Skin is warm and dry.   Nursing note and vitals reviewed.      Assessment/Plan   Shirley was seen today for depression.    Diagnoses and all orders for this visit:    Status post total replacement of right hip  -     Ambulatory Referral to Physical Therapy Evaluate and treat    Dysthymia    Chronic obstructive pulmonary disease, unspecified COPD type    Other orders  -     DULoxetine (CYMBALTA) 60 MG capsule; Take 1 capsule by mouth Daily.  -     mometasone-formoterol (DULERA 200) 200-5 MCG/ACT inhaler; Inhale 2 puffs 2 (Two) Times a Day.    Will have pt resume Duloxetine and encouraged pt to reduce alcohol intake, suggested pt seek counseling to help with depression. She has a friend who will be visiting this coming weekend.  Will refer pt back to PT for strengthening s/p total right hip replacement  Will start pt back on Dulera twice day, if symptoms of cough or wheezing or fever pt to follow up   Pt agrees.

## 2017-11-14 ENCOUNTER — OFFICE VISIT (OUTPATIENT)
Dept: FAMILY MEDICINE CLINIC | Facility: CLINIC | Age: 77
End: 2017-11-14

## 2017-11-14 VITALS
BODY MASS INDEX: 20.62 KG/M2 | OXYGEN SATURATION: 99 % | HEIGHT: 60 IN | RESPIRATION RATE: 16 BRPM | SYSTOLIC BLOOD PRESSURE: 122 MMHG | WEIGHT: 105 LBS | HEART RATE: 71 BPM | DIASTOLIC BLOOD PRESSURE: 64 MMHG | TEMPERATURE: 97.3 F

## 2017-11-14 DIAGNOSIS — J43.8 OTHER EMPHYSEMA (HCC): ICD-10-CM

## 2017-11-14 DIAGNOSIS — F10.10 ALCOHOL ABUSE, DAILY USE: ICD-10-CM

## 2017-11-14 DIAGNOSIS — M65.351 TRIGGER FINGER, RIGHT LITTLE FINGER: Primary | ICD-10-CM

## 2017-11-14 DIAGNOSIS — F34.1 DYSTHYMIA: ICD-10-CM

## 2017-11-14 PROCEDURE — 99214 OFFICE O/P EST MOD 30 MIN: CPT | Performed by: NURSE PRACTITIONER

## 2017-11-14 NOTE — PROGRESS NOTES
Subjective   Shirley Calhoun is a 77 y.o. female.     History of Present Illness   Shortness of breath yesterday  Found her inhaler today and is feeling a little better.  No fever or chills, no swelling of ankles or feet, no cough or wheezing or dizziness  Continues to smoke does not want to quit    depression  Alcohol abuse daily drinking Vodka does not know how much, but knows she needs some help, feeling very depressed, does not like herself  She is ready to see someone but it is very hard for her to open up about herself and her past problems  She just wants to enjoy life again]  She has tried volunteering but that has not gone very good. She tries to be friendly and positive but she does have a short fuse no tolerance for ignorance    Right hand pink finger is bend forward and locked she can no longer straighten it out, it getting worse over the past year    The following portions of the patient's history were reviewed and updated as appropriate: allergies, current medications, past family history, past medical history, past social history, past surgical history and problem list.    Review of Systems   Constitutional: Negative.  Negative for chills and fever.   HENT: Negative.    Eyes: Negative.    Respiratory: Positive for cough, chest tightness and shortness of breath. Negative for wheezing.    Cardiovascular: Negative.    Gastrointestinal: Negative.    Endocrine: Negative.    Genitourinary: Negative.    Musculoskeletal: Negative.    Skin: Negative.    Allergic/Immunologic: Negative.    Neurological: Negative.    Hematological: Negative.    Psychiatric/Behavioral: Positive for dysphoric mood. Negative for suicidal ideas.       Objective   Physical Exam   Constitutional: She is oriented to person, place, and time. She appears well-developed and well-nourished. No distress.   HENT:   Head: Normocephalic.   Right Ear: External ear normal.   Left Ear: External ear normal.   Nose: Nose normal.   Neck: Neck supple.    Cardiovascular: Normal rate, regular rhythm and normal heart sounds.    Pulmonary/Chest: Breath sounds normal. No respiratory distress. She has no wheezes. She has no rales.   Lymphadenopathy:     She has no cervical adenopathy.   Neurological: She is alert and oriented to person, place, and time.   Skin: Skin is warm and dry.   Psychiatric: Her speech is normal and behavior is normal. Judgment and thought content normal. Cognition and memory are normal. She exhibits a depressed mood (tearful at times).   Nursing note and vitals reviewed.      Assessment/Plan   Shirley was seen today for shortness of breath and depression.    Diagnoses and all orders for this visit:    Trigger finger, right little finger  -     Ambulatory Referral to Hand Surgery    Other emphysema    Alcohol abuse, daily use    Dysthymia    will contact [facility] to see if they have a therapist who can help pt with alcoholism and depression.  Will inform pt   Will refer pt to hand specialist for evaluation of trigger finger  Continue daily inhalers.

## 2017-12-15 ENCOUNTER — OFFICE VISIT (OUTPATIENT)
Dept: FAMILY MEDICINE CLINIC | Facility: CLINIC | Age: 77
End: 2017-12-15

## 2017-12-15 VITALS
HEIGHT: 60 IN | TEMPERATURE: 97.8 F | HEART RATE: 88 BPM | BODY MASS INDEX: 19.93 KG/M2 | DIASTOLIC BLOOD PRESSURE: 76 MMHG | RESPIRATION RATE: 20 BRPM | WEIGHT: 101.5 LBS | OXYGEN SATURATION: 96 % | SYSTOLIC BLOOD PRESSURE: 110 MMHG

## 2017-12-15 DIAGNOSIS — J44.9 CHRONIC OBSTRUCTIVE PULMONARY DISEASE, UNSPECIFIED COPD TYPE (HCC): Primary | ICD-10-CM

## 2017-12-15 DIAGNOSIS — R53.83 OTHER FATIGUE: ICD-10-CM

## 2017-12-15 DIAGNOSIS — D50.8 IRON DEFICIENCY ANEMIA SECONDARY TO INADEQUATE DIETARY IRON INTAKE: ICD-10-CM

## 2017-12-15 DIAGNOSIS — F34.1 DYSTHYMIA: ICD-10-CM

## 2017-12-15 PROCEDURE — 99214 OFFICE O/P EST MOD 30 MIN: CPT | Performed by: NURSE PRACTITIONER

## 2017-12-15 NOTE — PROGRESS NOTES
Subjective   Shirley Calhoun is a 77 y.o. female.     History of Present Illness   Shortness of breath and fatigue  + hx of Elder and low B12 not currently taking an iron supplement  Needs refill of Dulera  Still smoking cigarettes, not ready to quit  Going to travel to [place] to stay with her guardian in[place] her son's best friend for the holiday, driving herself  Looking forward to a quiet [place] holiday  Insomnia and fatigue  Went to bed at 6:30 last night but woke up feeling tired  Depression and alcohol abuse  Drinking Vodka, has not gained some weight  Has not started seeing therapist yet but is interested in    The following portions of the patient's history were reviewed and updated as appropriate: allergies, current medications, past family history, past medical history, past social history, past surgical history and problem list.    Review of Systems   Constitutional: Positive for fatigue. Negative for chills and fever.   HENT: Negative.    Eyes: Negative for visual disturbance.   Respiratory: Positive for cough and wheezing. Negative for chest tightness and shortness of breath.    Gastrointestinal: Negative.    Endocrine: Positive for cold intolerance.   Musculoskeletal: Negative.    Skin: Negative.    Neurological: Negative for dizziness and headaches.   Psychiatric/Behavioral: Positive for dysphoric mood and sleep disturbance. Negative for confusion, self-injury and suicidal ideas.       Objective   Physical Exam   Constitutional: She is oriented to person, place, and time. No distress.   Thin frail 78 yo female   HENT:   Head: Normocephalic.   Nose: Nose normal.   Mouth/Throat: Oropharynx is clear and moist.   Neck: No JVD present. No thyromegaly present.   Cardiovascular: Normal rate, regular rhythm and normal heart sounds.    Pulmonary/Chest: Effort normal and breath sounds normal. No respiratory distress. She has no wheezes. She has no rales.   Musculoskeletal: She exhibits no edema.   Neurological:  She is alert and oriented to person, place, and time.   Skin: Skin is warm and dry. There is pallor.   Nursing note and vitals reviewed.      Assessment/Plan   Shirley was seen today for soa and fatigue.    Diagnoses and all orders for this visit:    Chronic obstructive pulmonary disease, unspecified COPD type  -     Comprehensive metabolic panel; Future    Iron deficiency anemia secondary to inadequate dietary iron intake  -     Iron and TIBC; Future  -     CBC w AUTO Differential; Future  -     Ferritin; Future    Dysthymia    Other fatigue  -     Vitamin B12; Future    Other orders  -     mometasone-formoterol (DULERA 200) 200-5 MCG/ACT inhaler; Inhale 2 puffs 2 (Two) Times a Day.      Will have pt return in January to have labs up dated to check anemia iron, ferratin tibc and vit b 12, cbc  Will give pt sample of Dulera  I have given pt names of several local counselors for evaluation of depression and alcohol abuse  Pt agrees to call for counseling appt and to return to have labs in [facility]

## 2017-12-22 ENCOUNTER — RESULTS ENCOUNTER (OUTPATIENT)
Dept: FAMILY MEDICINE CLINIC | Facility: CLINIC | Age: 77
End: 2017-12-22

## 2017-12-22 DIAGNOSIS — D50.8 IRON DEFICIENCY ANEMIA SECONDARY TO INADEQUATE DIETARY IRON INTAKE: ICD-10-CM

## 2017-12-22 DIAGNOSIS — J44.9 CHRONIC OBSTRUCTIVE PULMONARY DISEASE, UNSPECIFIED COPD TYPE (HCC): ICD-10-CM

## 2017-12-22 DIAGNOSIS — R53.83 OTHER FATIGUE: ICD-10-CM

## 2018-01-03 ENCOUNTER — OFFICE VISIT (OUTPATIENT)
Dept: FAMILY MEDICINE CLINIC | Facility: CLINIC | Age: 78
End: 2018-01-03

## 2018-01-03 VITALS
TEMPERATURE: 97.5 F | RESPIRATION RATE: 20 BRPM | HEIGHT: 60 IN | WEIGHT: 104 LBS | DIASTOLIC BLOOD PRESSURE: 70 MMHG | BODY MASS INDEX: 20.42 KG/M2 | OXYGEN SATURATION: 97 % | SYSTOLIC BLOOD PRESSURE: 120 MMHG | HEART RATE: 88 BPM

## 2018-01-03 DIAGNOSIS — J44.1 CHRONIC OBSTRUCTIVE PULMONARY DISEASE WITH ACUTE EXACERBATION (HCC): Primary | ICD-10-CM

## 2018-01-03 DIAGNOSIS — R68.89 FLU-LIKE SYMPTOMS: ICD-10-CM

## 2018-01-03 LAB
EXPIRATION DATE: NORMAL
FLUAV AG NPH QL: NORMAL
FLUBV AG NPH QL: NORMAL
INTERNAL CONTROL: NORMAL
Lab: NORMAL

## 2018-01-03 PROCEDURE — 87804 INFLUENZA ASSAY W/OPTIC: CPT | Performed by: NURSE PRACTITIONER

## 2018-01-03 PROCEDURE — 99213 OFFICE O/P EST LOW 20 MIN: CPT | Performed by: NURSE PRACTITIONER

## 2018-01-03 RX ORDER — AZITHROMYCIN 250 MG/1
TABLET, FILM COATED ORAL
Qty: 6 TABLET | Refills: 0 | Status: SHIPPED | OUTPATIENT
Start: 2018-01-03 | End: 2018-01-19

## 2018-01-03 RX ORDER — BENZONATATE 200 MG/1
200 CAPSULE ORAL 3 TIMES DAILY PRN
Qty: 45 CAPSULE | Refills: 0 | Status: SHIPPED | OUTPATIENT
Start: 2018-01-03 | End: 2018-05-15

## 2018-01-03 NOTE — PROGRESS NOTES
Subjective   Shirley Calhoun is a 77 y.o. female.     History of Present Illness   Cough and congestion since just before Geraldine, traveled to [place] to see family and was feeling pretty sick during most of the visit  Started feeling a slight better today   Taking Daquil Niquil Robitussin  Body aches and HA, productive cough with green sputum  Worried she might have the flu, would like to have a flu test, no known exposure to flu  Seeing her family really helped her mood    The following portions of the patient's history were reviewed and updated as appropriate: allergies, current medications, past family history, past medical history, past social history, past surgical history and problem list.    Review of Systems   Constitutional: Positive for fatigue. Negative for chills and fever.   HENT: Positive for congestion, postnasal drip, rhinorrhea, sinus pain and sinus pressure. Negative for sneezing and sore throat.    Eyes: Negative.    Respiratory: Positive for cough and wheezing. Negative for choking, chest tightness and shortness of breath.    Cardiovascular: Negative.  Negative for chest pain and palpitations.   Gastrointestinal: Positive for diarrhea. Negative for abdominal pain, nausea and vomiting.   Endocrine: Negative.    Musculoskeletal: Negative.    Skin: Negative.    Neurological: Positive for headaches. Negative for dizziness and light-headedness.   Hematological: Negative.  Negative for adenopathy.   Psychiatric/Behavioral: Negative.        Objective   Physical Exam   Constitutional: She is oriented to person, place, and time. She appears well-developed and well-nourished. No distress.   HENT:   Head: Normocephalic.   Right Ear: Tympanic membrane, external ear and ear canal normal.   Left Ear: Tympanic membrane, external ear and ear canal normal.   Nose: Mucosal edema and rhinorrhea present. Right sinus exhibits maxillary sinus tenderness and frontal sinus tenderness. Left sinus exhibits maxillary  sinus tenderness and frontal sinus tenderness.   Mouth/Throat: Uvula is midline, oropharynx is clear and moist and mucous membranes are normal. No oropharyngeal exudate, posterior oropharyngeal edema or posterior oropharyngeal erythema.   Eyes: Conjunctivae are normal.   Neck: Normal range of motion. Neck supple.   Cardiovascular: Normal rate, regular rhythm and normal heart sounds.    Pulmonary/Chest: Effort normal and breath sounds normal. She has no wheezes.   Lymphadenopathy:     She has no cervical adenopathy.   Neurological: She is alert and oriented to person, place, and time.   Skin: Skin is warm and dry. No rash noted.   Psychiatric: She has a normal mood and affect. Her behavior is normal. Judgment and thought content normal.   Nursing note and vitals reviewed.      Assessment/Plan   Shirley was seen today for chest congestion & cough and body aches & diarrhea.    Diagnoses and all orders for this visit:    Chronic obstructive pulmonary disease with acute exacerbation    Flu-like symptoms  -     POCT Influenza A/B    Other orders  -     azithromycin (ZITHROMAX) 250 MG tablet; Take 2 tablets the first day, then 1 tablet daily for 4 days.  -     benzonatate (TESSALON) 200 MG capsule; Take 1 capsule by mouth 3 (Three) Times a Day As Needed for Cough.      Flu A and B negative pt informed  Will start pt on Zpak and Tessalon perles for cough  Use inhalers as needed  F/U if not improving or got to ER if difficulty breathing, pt agrees.

## 2018-01-19 ENCOUNTER — OFFICE VISIT (OUTPATIENT)
Dept: FAMILY MEDICINE CLINIC | Facility: CLINIC | Age: 78
End: 2018-01-19

## 2018-01-19 VITALS
TEMPERATURE: 98 F | SYSTOLIC BLOOD PRESSURE: 120 MMHG | HEART RATE: 84 BPM | BODY MASS INDEX: 19.44 KG/M2 | HEIGHT: 60 IN | RESPIRATION RATE: 20 BRPM | DIASTOLIC BLOOD PRESSURE: 75 MMHG | WEIGHT: 99 LBS

## 2018-01-19 DIAGNOSIS — N39.3 STRESS INCONTINENCE: Primary | ICD-10-CM

## 2018-01-19 DIAGNOSIS — F34.1 DYSTHYMIA: ICD-10-CM

## 2018-01-19 DIAGNOSIS — R53.83 FATIGUE DUE TO DEPRESSION: ICD-10-CM

## 2018-01-19 DIAGNOSIS — F32.A FATIGUE DUE TO DEPRESSION: ICD-10-CM

## 2018-01-19 LAB
ALBUMIN SERPL-MCNC: 4.5 G/DL (ref 3.2–4.8)
ALBUMIN/GLOB SERPL: 2 G/DL (ref 1.5–2.5)
ALP SERPL-CCNC: 54 U/L (ref 25–100)
ALT SERPL-CCNC: 22 U/L (ref 7–40)
AST SERPL-CCNC: 31 U/L (ref 0–33)
BASOPHILS # BLD AUTO: 0.04 10*3/MM3 (ref 0–0.2)
BASOPHILS NFR BLD AUTO: 0.5 % (ref 0–1)
BILIRUB BLD-MCNC: NEGATIVE MG/DL
BILIRUB SERPL-MCNC: 0.6 MG/DL (ref 0.3–1.2)
BUN SERPL-MCNC: 23 MG/DL (ref 9–23)
BUN/CREAT SERPL: 23 (ref 7–25)
CALCIUM SERPL-MCNC: 9.8 MG/DL (ref 8.7–10.4)
CHLORIDE SERPL-SCNC: 100 MMOL/L (ref 99–109)
CO2 SERPL-SCNC: 26 MMOL/L (ref 20–31)
CREAT SERPL-MCNC: 1 MG/DL (ref 0.6–1.3)
EOSINOPHIL # BLD AUTO: 0.12 10*3/MM3 (ref 0–0.3)
EOSINOPHIL NFR BLD AUTO: 1.5 % (ref 0–3)
ERYTHROCYTE [DISTWIDTH] IN BLOOD BY AUTOMATED COUNT: 13.7 % (ref 11.3–14.5)
FERRITIN SERPL-MCNC: 112 NG/ML (ref 10–291)
GLOBULIN SER CALC-MCNC: 2.2 GM/DL
GLUCOSE SERPL-MCNC: 88 MG/DL (ref 70–100)
GLUCOSE UR STRIP-MCNC: NEGATIVE MG/DL
HCT VFR BLD AUTO: 37.3 % (ref 34.5–44)
HGB BLD-MCNC: 12.3 G/DL (ref 11.5–15.5)
IMM GRANULOCYTES # BLD: 0.02 10*3/MM3 (ref 0–0.03)
IMM GRANULOCYTES NFR BLD: 0.3 % (ref 0–0.6)
IRON SATN MFR SERPL: 50 % (ref 15–50)
IRON SERPL-MCNC: 143 MCG/DL (ref 50–175)
KETONES UR QL: ABNORMAL
LEUKOCYTE EST, POC: NEGATIVE
LYMPHOCYTES # BLD AUTO: 1.19 10*3/MM3 (ref 0.6–4.8)
LYMPHOCYTES NFR BLD AUTO: 15.3 % (ref 24–44)
MCH RBC QN AUTO: 35.1 PG (ref 27–31)
MCHC RBC AUTO-ENTMCNC: 33 G/DL (ref 32–36)
MCV RBC AUTO: 106.6 FL (ref 80–99)
MONOCYTES # BLD AUTO: 0.89 10*3/MM3 (ref 0–1)
MONOCYTES NFR BLD AUTO: 11.5 % (ref 0–12)
NEUTROPHILS # BLD AUTO: 5.51 10*3/MM3 (ref 1.5–8.3)
NEUTROPHILS NFR BLD AUTO: 70.9 % (ref 41–71)
NITRITE UR-MCNC: NEGATIVE MG/ML
PH UR: 5 [PH] (ref 5–8)
PLATELET # BLD AUTO: 348 10*3/MM3 (ref 150–450)
POTASSIUM SERPL-SCNC: 4.2 MMOL/L (ref 3.5–5.5)
PROT SERPL-MCNC: 6.7 G/DL (ref 5.7–8.2)
PROT UR STRIP-MCNC: ABNORMAL MG/DL
RBC # BLD AUTO: 3.5 10*6/MM3 (ref 3.89–5.14)
RBC # UR STRIP: NEGATIVE /UL
SODIUM SERPL-SCNC: 136 MMOL/L (ref 132–146)
SP GR UR: 1.03 (ref 1–1.03)
TIBC SERPL-MCNC: 284 MCG/DL (ref 250–450)
UIBC SERPL-MCNC: 141 MCG/DL
UROBILINOGEN UR QL: NORMAL
VIT B12 SERPL-MCNC: >2000 PG/ML (ref 211–911)
WBC # BLD AUTO: 7.77 10*3/MM3 (ref 3.5–10.8)

## 2018-01-19 PROCEDURE — 99213 OFFICE O/P EST LOW 20 MIN: CPT | Performed by: NURSE PRACTITIONER

## 2018-01-19 PROCEDURE — 81003 URINALYSIS AUTO W/O SCOPE: CPT | Performed by: NURSE PRACTITIONER

## 2018-01-19 RX ORDER — OXYBUTYNIN CHLORIDE 5 MG/1
5 TABLET, EXTENDED RELEASE ORAL DAILY
Qty: 30 TABLET | Refills: 5 | Status: SHIPPED | OUTPATIENT
Start: 2018-01-19 | End: 2019-02-11 | Stop reason: SDUPTHER

## 2018-01-19 NOTE — PROGRESS NOTES
Subjective   Shirley Calhoun is a 77 y.o. female.     History of Present Illness   Bladder control issues with cough   Leaking having to wear a pad, no burning with urination, no frequency or urgency  + hx of UTI in the past  Has been on medication in the past but she cannot recall the name    Fatigue wants to have labs   Got the iron pills but it really upsets her stomach so she had to stop taking them  Feeling tired, wants to have more energy to do more things    Depression  Moderate anger and anxiety  Likes the therapist I sent her to; going every 2 weeks        The following portions of the patient's history were reviewed and updated as appropriate: allergies, current medications, past family history, past medical history, past social history, past surgical history and problem list.    Review of Systems   Constitutional: Positive for fatigue. Negative for activity change, appetite change, chills and fever.   Respiratory: Negative.  Negative for cough, chest tightness, shortness of breath and wheezing.    Cardiovascular: Negative.  Negative for chest pain.   Gastrointestinal: Negative.    Genitourinary: Negative for difficulty urinating, dysuria, flank pain, frequency, hematuria and urgency. Enuresis: stress incontinence    Musculoskeletal: Positive for arthralgias (right hip pain) and joint swelling. Negative for back pain.   Neurological: Negative for dizziness, light-headedness and headaches.   Psychiatric/Behavioral: Positive for dysphoric mood and sleep disturbance. Negative for self-injury and suicidal ideas. The patient is nervous/anxious.        Objective   Physical Exam   Constitutional: She is oriented to person, place, and time. She appears well-developed and well-nourished.   HENT:   Head: Normocephalic.   Nose: Nose normal.   Cardiovascular: Normal rate, regular rhythm and normal heart sounds.    Pulmonary/Chest: Effort normal and breath sounds normal.   Abdominal: Soft.   Musculoskeletal: Normal  range of motion.   Neurological: She is alert and oriented to person, place, and time.   Skin: Skin is warm and dry.   Psychiatric: She has a normal mood and affect. Her behavior is normal. Judgment and thought content normal.   Nursing note and vitals reviewed.      Assessment/Plan   Shirley was seen today for bladder weakness and fatigue.    Diagnoses and all orders for this visit:    Stress incontinence  -     POCT urinalysis dipstick, automated    Fatigue due to depression    Other orders  -     oxybutynin XL (DITROPAN-XL) 5 MG 24 hr tablet; Take 1 tablet by mouth Daily.

## 2018-01-22 ENCOUNTER — TELEPHONE (OUTPATIENT)
Dept: FAMILY MEDICINE CLINIC | Facility: CLINIC | Age: 78
End: 2018-01-22

## 2018-01-22 DIAGNOSIS — Z96.641 STATUS POST RIGHT HIP REPLACEMENT: Primary | ICD-10-CM

## 2018-01-22 NOTE — TELEPHONE ENCOUNTER
Spoke with pt regarding her labs. Pt states she hasn't done PT in a while b/c she went home for [place] and then has been sick.  [facility] states they need a new order from her. She does PT for a R hip replacement. She states this does help her. Please advise.

## 2018-01-23 ENCOUNTER — TELEPHONE (OUTPATIENT)
Dept: FAMILY MEDICINE CLINIC | Facility: CLINIC | Age: 78
End: 2018-01-23

## 2018-01-23 NOTE — TELEPHONE ENCOUNTER
----- Message from Beevrley Olson sent at 1/23/2018  4:21 PM EST -----  Contact: Lara Kline  Patient states that someone was telling her about something that will help her breath at night. She would like Lara to call her and explain. Please call at [phone].

## 2018-01-24 NOTE — TELEPHONE ENCOUNTER
Per pt did not want to go to physical therapy, [facility] and is also not interested in a sleep study.

## 2018-01-25 NOTE — TELEPHONE ENCOUNTER
When I spoke w/ pt she remembered it was a CPAP possibly after a sleep study, which she is not interested in.

## 2018-03-01 ENCOUNTER — OFFICE VISIT (OUTPATIENT)
Dept: FAMILY MEDICINE CLINIC | Facility: CLINIC | Age: 78
End: 2018-03-01

## 2018-03-01 VITALS
HEIGHT: 60 IN | RESPIRATION RATE: 18 BRPM | SYSTOLIC BLOOD PRESSURE: 128 MMHG | HEART RATE: 84 BPM | DIASTOLIC BLOOD PRESSURE: 76 MMHG | TEMPERATURE: 97.2 F | BODY MASS INDEX: 19.34 KG/M2 | WEIGHT: 98.5 LBS

## 2018-03-01 DIAGNOSIS — J43.1 PANLOBULAR EMPHYSEMA (HCC): Primary | ICD-10-CM

## 2018-03-01 DIAGNOSIS — R21 RASH AND NONSPECIFIC SKIN ERUPTION: ICD-10-CM

## 2018-03-01 PROCEDURE — 99213 OFFICE O/P EST LOW 20 MIN: CPT | Performed by: NURSE PRACTITIONER

## 2018-03-01 RX ORDER — ALBUTEROL SULFATE 90 UG/1
2 AEROSOL, METERED RESPIRATORY (INHALATION) EVERY 4 HOURS PRN
Qty: 18 G | Refills: 5 | Status: SHIPPED | OUTPATIENT
Start: 2018-03-01 | End: 2019-06-30 | Stop reason: SDUPTHER

## 2018-03-01 RX ORDER — TRIAMCINOLONE ACETONIDE 1 MG/G
CREAM TOPICAL 2 TIMES DAILY
Qty: 45 G | Refills: 0 | Status: SHIPPED | OUTPATIENT
Start: 2018-03-01 | End: 2021-04-01

## 2018-03-01 NOTE — PROGRESS NOTES
Subjective   Shirley Calhoun is a 77 y.o. female.     History of Present Illness   Peeling and itchy rash on both hands for the past week  Has been using new lotion her niece sent to her  No new detergents or cleaning chemicals  No swelling or redness  Using vaseline     COPD  Needs refill on inhalers  Smoking daily, does not want to quit    Insomnia/Depression  Taking Trazodone to help with insomnia, feeling tired  Feeling anxious about her daughter coming to visit  Has not seen counselor recently  Going on trip to [place] for a few days looking forward to [place]  Will resume counseling when she returns  Still drinking some alcohol daily    The following portions of the patient's history were reviewed and updated as appropriate: allergies, current medications, past family history, past medical history, past social history, past surgical history and problem list.    Review of Systems   Constitutional: Positive for fatigue.   HENT: Negative.    Respiratory: Positive for cough, chest tightness, shortness of breath and wheezing.    Cardiovascular: Negative.    Gastrointestinal: Negative.    Skin: Positive for rash.   Neurological: Negative for dizziness and light-headedness.   Psychiatric/Behavioral: Positive for dysphoric mood and sleep disturbance. The patient is nervous/anxious.        Objective   Physical Exam   Constitutional: She is oriented to person, place, and time. She appears well-developed and well-nourished. No distress.   HENT:   Head: Normocephalic.   Right Ear: Tympanic membrane, external ear and ear canal normal.   Left Ear: Tympanic membrane, external ear and ear canal normal.   Nose: Mucosal edema and rhinorrhea present. Right sinus exhibits maxillary sinus tenderness and frontal sinus tenderness. Left sinus exhibits maxillary sinus tenderness and frontal sinus tenderness.   Mouth/Throat: Uvula is midline, oropharynx is clear and moist and mucous membranes are normal. No oropharyngeal exudate,  posterior oropharyngeal edema or posterior oropharyngeal erythema.   Eyes: Conjunctivae are normal.   Neck: Normal range of motion. Neck supple.   Cardiovascular: Normal rate, regular rhythm and normal heart sounds.    Pulmonary/Chest: Effort normal and breath sounds normal. No respiratory distress. She has no wheezes. She has no rales.   Lymphadenopathy:     She has no cervical adenopathy.   Neurological: She is alert and oriented to person, place, and time.   Skin: Skin is warm and dry. Rash (peeling skin on fingers and palms of both hands) noted.   Psychiatric: She has a normal mood and affect. Her behavior is normal. Judgment and thought content normal.   Nursing note and vitals reviewed.      Assessment/Plan   Shirley was seen today for rash and med refill.    Diagnoses and all orders for this visit:    Panlobular emphysema  -     albuterol (PROAIR HFA) 108 (90 Base) MCG/ACT inhaler; Inhale 2 puffs Every 4 (Four) Hours As Needed for Wheezing.    Rash and nonspecific skin eruption  -     triamcinolone (KENALOG) 0.1 % cream; Apply  topically 2 (Two) Times a Day.

## 2018-03-22 ENCOUNTER — OFFICE VISIT (OUTPATIENT)
Dept: FAMILY MEDICINE CLINIC | Facility: CLINIC | Age: 78
End: 2018-03-22

## 2018-03-22 VITALS
BODY MASS INDEX: 19.91 KG/M2 | DIASTOLIC BLOOD PRESSURE: 70 MMHG | SYSTOLIC BLOOD PRESSURE: 144 MMHG | HEIGHT: 60 IN | HEART RATE: 88 BPM | WEIGHT: 101.4 LBS | TEMPERATURE: 98.2 F | RESPIRATION RATE: 20 BRPM

## 2018-03-22 DIAGNOSIS — L98.9 CHANGING SKIN LESION: Primary | ICD-10-CM

## 2018-03-22 PROCEDURE — 99213 OFFICE O/P EST LOW 20 MIN: CPT | Performed by: NURSE PRACTITIONER

## 2018-03-22 NOTE — PROGRESS NOTES
Subjective   Shirley Calhoun is a 78 y.o. female.     History of Present Illness   Changing skin lesions on nose, right upper thigh and left lower leg  On her right thigh it is changing to 2 colors, raised, no bleeding, flaking  Skin lesion on nose one is very light colored and the other ones are brown spots  2 lesions on left lower leg are flat and brown  No hx of basal cell cancer or melanoma  Would like a referral to Dermatology  Long hx of sun exposure, likes the sun and takes trips to [place]  She does use some sunscreen  + hx of tobacco use    The following portions of the patient's history were reviewed and updated as appropriate: allergies, current medications, past family history, past medical history, past social history, past surgical history and problem list.    Review of Systems    Objective   Physical Exam   Constitutional: She is oriented to person, place, and time. She appears well-developed and well-nourished.   HENT:   Head: Normocephalic.   Cardiovascular: Normal rate, regular rhythm and normal heart sounds.    Pulmonary/Chest: Effort normal and breath sounds normal.   Neurological: She is alert and oriented to person, place, and time.   Skin: Skin is warm and dry. Lesion noted. No rash noted.   Nursing note and vitals reviewed.        Assessment/Plan   Shirley was seen today for skin problem.    Diagnoses and all orders for this visit:    Changing skin lesion  -     Ambulatory Referral to Dermatology      Will refer to Dermatology Dr Osman at [facility]

## 2018-04-12 ENCOUNTER — OFFICE VISIT (OUTPATIENT)
Dept: ORTHOPEDIC SURGERY | Facility: CLINIC | Age: 78
End: 2018-04-12

## 2018-04-12 VITALS
SYSTOLIC BLOOD PRESSURE: 130 MMHG | HEIGHT: 60 IN | DIASTOLIC BLOOD PRESSURE: 83 MMHG | HEART RATE: 86 BPM | BODY MASS INDEX: 18.74 KG/M2 | WEIGHT: 95.46 LBS

## 2018-04-12 DIAGNOSIS — M79.644 PAIN IN FINGER OF RIGHT HAND: Primary | ICD-10-CM

## 2018-04-12 DIAGNOSIS — M72.0 DUPUYTREN'S CONTRACTURE OF RIGHT HAND: ICD-10-CM

## 2018-04-12 PROCEDURE — 99203 OFFICE O/P NEW LOW 30 MIN: CPT | Performed by: ORTHOPAEDIC SURGERY

## 2018-04-12 NOTE — PROGRESS NOTES
[facility] Note    Subjective     Pain of the Right Hand (Right 5th finger problem for greater than two years. There is no pain but she cannot fully extend her finger. Nothing makes it better or worse. )      HPI    Shirley Calhoun is a 78 y.o. female. Patient is here for problem today with her right small digit.  This is been limited in regards to motion for the last 2 years.  No history of any trauma.  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  There is no pain involved.     Past Medical History:   Diagnosis Date   • Anxiety    • Arthritis    • Bowel obstruction    • COPD (chronic obstructive pulmonary disease)    • Depression    • Fatigue    • GERD (gastroesophageal reflux disease)    • Hip pain    • Hx of colonic polyps    • Malignant neoplasm of breast, left    • Wears dentures    • Wears eyeglasses       Past Surgical History:   Procedure Laterality Date   • BACK SURGERY     • BRAIN SURGERY      [facility]   • CATARACT EXTRACTION Bilateral 2013   • COLONOSCOPY      1 year ago   • DILATATION AND CURETTAGE     • KNEE SURGERY Right    • MASTECTOMY Bilateral    • TOTAL HIP ARTHROPLASTY Right 5/1/2017    Procedure: RIGHT TOTAL HIP ARTHROPLASTY ANTERIOR;  Surgeon: Nii Nayak MD;  Location: [facility];  Service:       Family History   Problem Relation Age of Onset   • No Known Problems Mother    • Alcohol abuse Father    • No Known Problems Sister    • No Known Problems Brother    • No Known Problems Daughter    • No Known Problems Son      Social History     Social History   • Marital status:      Spouse name: N/A   • Number of children: N/A   • Years of education: N/A     Occupational History   • Not on file.     Social History Main Topics   • Smoking status: Current Every Day Smoker     Packs/day: 1.00   • Smokeless tobacco: Never Used   • Alcohol use Yes      Comment: hx of alcohol abuse drinking vodka daily   • Drug use: No   • Sexual activity: Defer     Other Topics  Concern   • Not on file     Social History Narrative   • No narrative on file      Current Outpatient Prescriptions on File Prior to Visit   Medication Sig Dispense Refill   • albuterol (PROAIR HFA) 108 (90 Base) MCG/ACT inhaler Inhale 2 puffs Every 4 (Four) Hours As Needed for Wheezing. 18 g 5   • aspirin 81 MG tablet Take 1 tablet by mouth Daily. Resume in 1 month     • benzonatate (TESSALON) 200 MG capsule Take 1 capsule by mouth 3 (Three) Times a Day As Needed for Cough. 45 capsule 0   • Calcium 600-400 MG-UNIT chewable tablet Chew 1 tablet daily.     • Cyanocobalamin (VITAMIN B 12 PO) Take  by mouth.     • docusate sodium 100 MG capsule Take 100 mg by mouth 2 (Two) Times a Day. 60 capsule 0   • DULoxetine (CYMBALTA) 60 MG capsule Take 1 capsule by mouth Daily. 30 capsule 6   • famotidine (PEPCID) 10 MG tablet Take 10 mg by mouth As Needed for Heartburn.     • loratadine (CLARITIN) 10 MG tablet Take 10 mg by mouth Daily.     • mometasone-formoterol (DULERA 200) 200-5 MCG/ACT inhaler Inhale 2 puffs 2 (Two) Times a Day. 1 g 0   • oxybutynin XL (DITROPAN-XL) 5 MG 24 hr tablet Take 1 tablet by mouth Daily. 30 tablet 5   • Potassium Gluconate 550 MG tablet Take 550 mg by mouth Daily.     • thiamine (VITAMIN B-1) 100 MG tablet Take 100 mg by mouth Daily.     • traZODone (DESYREL) 150 MG tablet TAKE 1 AND 1/2 TABLETS BY MOUTH AT BEDTIME 135 tablet 3   • triamcinolone (KENALOG) 0.1 % cream Apply  topically 2 (Two) Times a Day. 45 g 0     No current facility-administered medications on file prior to visit.       No Known Allergies     The following portions of the patient's history were reviewed and updated as appropriate: allergies, current medications, past family history, past medical history, past social history, past surgical history and problem list.    Review of Systems   Constitutional: Negative.    HENT: Negative.    Eyes: Negative.    Respiratory: Negative.    Cardiovascular: Negative.    Gastrointestinal:  "Negative.    Endocrine: Negative.    Genitourinary: Negative.    Musculoskeletal: Positive for arthralgias.   Skin: Negative.    Allergic/Immunologic: Negative.    Neurological: Negative.    Hematological: Negative.    Psychiatric/Behavioral: Negative.         Objective      Physical Exam  /83   Pulse 86   Ht 152.4 cm (60\")   Wt 43.3 kg (95 lb 7.4 oz)   BMI 18.64 kg/m²     Body mass index is 18.64 kg/m².    General  Mental Status - alert  General Appearance - cooperative, well groomed, not in acute distress  Orientation - Oriented X3  Build & Nutrition - well developed and well nourished  Posture - normal posture  Gait - normal gait     Integumentary  Global Assessment  Examination of related systems reveals - no lymphadenopathy  General Characteristics  Overall examination of the patient's skin reveals - no rashes, no evidence of scars, no suspicious lesions and no bruises.  Color - normal coloration of skin.    Ortho Exam  Peripheral Vascular   Bilateral Upper Extremity    No cyanotic nail beds    Pink nail beds and rapid capillary refill   Palpation    Radial Pulse - Bilaterally normal    Neurologic   Sensory: Light touch intact- Right hand       Right Upper Extremity    Right wrist extensors: 5/5    Right wrist flexors: 5/5    Right intrinsics: 5/5    Musculoskeletal      Right Elbow    Forearm supination: AROM - 90 degrees    Forearm pronation: AROM - 90 degrees     Inspection and Palpation   Right Wrist      Tenderness - none    Swelling - none    Crepitus - none    Muscle tone - no atrophy        ROJM:      Right Wrist    Flexion: AROM - 90 degrees    Extension: AROM - 90 degrees     Deformities, Malalignments, Discrepancies    None     Functional Testing   Right Wrist    Tinel's Sign-- negative    Phalen's Sign-- negative    Carpal Compression Test-- negative    Finklestein's Test-- negative    Thumb CMC joint--negative       Strength and Tone    Right  strength: good         Hand Exam:  " Patient has no flexion contracture the MCP but has a significant flexion contracture and cord associated with the IP joints.  This is not fully correctable to neutral.  There is a small palpable cord in the palm but it's minimal really.    Imaging/Studies  Imaging Results (last 24 hours)     Procedure Component Value Units Date/Time    XR Finger 2+ View Right [[id number]] Resulted:  04/12/18 1623     Updated:  04/12/18 1629    Narrative:       Right Finger X-Ray    Indication: Pain    Views:  AP and lateral x-ray of the small digit    Comparison: none    Findings:  No fracture  No bony lesion  Normal soft tissues  Normal joint spaces    Impression: negative right finger                Assessment:  1. Pain in finger of right hand    2. Dupuytren's contracture of right hand        Plan:  1. Continue over-the-counter medications as needed for any discomfort that may occur  2. Given the level of contracture, I would think that this would inhibit function.  She will think about things and call us back if she wants to seek further treatment.      Medical Decision Making  Management Options : over-the-counter medicine  Data/Risk: radiology tests and independent visualization of imaging, lab tests, or EMG/NCV    Adriano Meek MD  04/12/18  6:09 PM

## 2018-05-15 ENCOUNTER — OFFICE VISIT (OUTPATIENT)
Dept: FAMILY MEDICINE CLINIC | Facility: CLINIC | Age: 78
End: 2018-05-15

## 2018-05-15 VITALS
SYSTOLIC BLOOD PRESSURE: 140 MMHG | HEIGHT: 60 IN | RESPIRATION RATE: 20 BRPM | BODY MASS INDEX: 19.44 KG/M2 | TEMPERATURE: 98.2 F | WEIGHT: 99 LBS | HEART RATE: 100 BPM | DIASTOLIC BLOOD PRESSURE: 70 MMHG

## 2018-05-15 DIAGNOSIS — F32.4 MAJOR DEPRESSIVE DISORDER WITH SINGLE EPISODE, IN PARTIAL REMISSION (HCC): ICD-10-CM

## 2018-05-15 DIAGNOSIS — Z72.0 TOBACCO ABUSE: ICD-10-CM

## 2018-05-15 DIAGNOSIS — F10.10 ALCOHOL ABUSE, DAILY USE: ICD-10-CM

## 2018-05-15 DIAGNOSIS — Z23 IMMUNIZATION DUE: ICD-10-CM

## 2018-05-15 DIAGNOSIS — Z96.641 STATUS POST TOTAL REPLACEMENT OF RIGHT HIP: ICD-10-CM

## 2018-05-15 DIAGNOSIS — J43.1 PANLOBULAR EMPHYSEMA (HCC): Primary | ICD-10-CM

## 2018-05-15 PROCEDURE — G0439 PPPS, SUBSEQ VISIT: HCPCS | Performed by: NURSE PRACTITIONER

## 2018-05-15 PROCEDURE — G0009 ADMIN PNEUMOCOCCAL VACCINE: HCPCS | Performed by: NURSE PRACTITIONER

## 2018-05-15 PROCEDURE — 90670 PCV13 VACCINE IM: CPT | Performed by: NURSE PRACTITIONER

## 2018-05-15 NOTE — PROGRESS NOTES
QUICK REFERENCE INFORMATION:  The ABCs of the Annual Wellness Visit    Subsequent Medicare Wellness Visit    HEALTH RISK ASSESSMENT    1940    Recent Hospitalizations:  No hospitalization(s) within the last year.    Current Medical Providers:  Patient Care Team:  LORNA Dwyer as PCP - General (Family Medicine)  No Known Provider as PCP - Family Medicine  LORNA Dwyer as PCP - Claims Attributed    Smoking Status:  History   Smoking Status   • Current Every Day Smoker   • Packs/day: 1.00   Smokeless Tobacco   • Never Used     Alcohol Consumption:  History   Alcohol Use   • Yes     Comment: hx of alcohol abuse drinking vodka daily     Depression Screen:   PHQ-2/PHQ-9 Depression Screening 5/15/2018   Little interest or pleasure in doing things 0   Feeling down, depressed, or hopeless 1   Total Score 1       Health Habits and Functional and Cognitive Screening:  Functional & Cognitive Status 5/15/2018   Do you have difficulty preparing food and eating? No   Do you have difficulty bathing yourself, getting dressed or grooming yourself? No   Do you have difficulty using the toilet? No   Do you have difficulty moving around from place to place? No   Do you have trouble with steps or getting out of a bed or a chair? No   In the past year have you fallen or experienced a near fall? No   Current Diet Unhealthy Diet   Dental Exam Up to date   Eye Exam Up to date   Exercise (times per week) 2 times per week   Current Exercise Activities Include Walking   Do you need help using the phone?  No   Are you deaf or do you have serious difficulty hearing?  No   Do you need help with transportation? No   Do you need help shopping? No   Do you need help preparing meals?  No   Do you need help with housework?  No   Do you need help with laundry? No   Do you need help taking your medications? No   Do you need help managing money? No   Do you ever drive or ride in a car without wearing a seat belt? No   Does the patient have  evidence of cognitive impairment? No    Aspirin use counseling: Start ASA 81 mg daily   Recent Lab Results:  CMP:  Lab Results   Component Value Date    GLU 88 01/19/2018    BUN 23 01/19/2018    CREATININE 1.00 01/19/2018    EGFRIFNONA 54 (L) 01/19/2018    EGFRIFAFRI 65 01/19/2018    BCR 23.0 01/19/2018     01/19/2018    K 4.2 01/19/2018    CO2 26.0 01/19/2018    CALCIUM 9.8 01/19/2018    PROTENTOTREF 6.7 01/19/2018    ALBUMIN 4.50 01/19/2018    LABGLOBREF 2.2 01/19/2018    LABIL2 2.0 01/19/2018    BILITOT 0.6 01/19/2018    ALKPHOS 54 01/19/2018    AST 31 01/19/2018    ALT 22 01/19/2018     Lipid Panel:     HbA1c:  Lab Results   Component Value Date    HGBA1C 5.00 04/25/2017       Visual Acuity:  No exam data present    Age-appropriate Screening Schedule:  Refer to the list below for future screening recommendations based on patient's age, sex and/or medical conditions. Orders for these recommended tests are listed in the plan section. The patient has been provided with a written plan.    Health Maintenance   Topic Date Due   • TDAP/TD VACCINES (1 - Tdap) 09/03/2018 (Originally 3/13/1959)   • INFLUENZA VACCINE  08/01/2018   • PNEUMOCOCCAL VACCINES (65+ LOW/MEDIUM RISK)  Completed   • ZOSTER VACCINE  Completed   • MAMMOGRAM  Excluded        Subjective   History of Present Illness    Shirley Calhoun is a 78 y.o. female who presents for an Subsequent Wellness Visit.    The following portions of the patient's history were reviewed and updated as appropriate: allergies, current medications, past family history, past medical history, past social history, past surgical history and problem list.    Outpatient Medications Prior to Visit   Medication Sig Dispense Refill   • albuterol (PROAIR HFA) 108 (90 Base) MCG/ACT inhaler Inhale 2 puffs Every 4 (Four) Hours As Needed for Wheezing. 18 g 5   • aspirin 81 MG tablet Take 1 tablet by mouth Daily. Resume in 1 month     • Calcium 600-400 MG-UNIT chewable tablet Chew 1  tablet daily.     • Cyanocobalamin (VITAMIN B 12 PO) Take  by mouth.     • docusate sodium 100 MG capsule Take 100 mg by mouth 2 (Two) Times a Day. 60 capsule 0   • DULoxetine (CYMBALTA) 60 MG capsule Take 1 capsule by mouth Daily. 30 capsule 6   • famotidine (PEPCID) 10 MG tablet Take 10 mg by mouth As Needed for Heartburn.     • loratadine (CLARITIN) 10 MG tablet Take 10 mg by mouth Daily.     • mometasone-formoterol (DULERA 200) 200-5 MCG/ACT inhaler Inhale 2 puffs 2 (Two) Times a Day. 1 g 0   • oxybutynin XL (DITROPAN-XL) 5 MG 24 hr tablet Take 1 tablet by mouth Daily. 30 tablet 5   • Potassium Gluconate 550 MG tablet Take 550 mg by mouth Daily.     • thiamine (VITAMIN B-1) 100 MG tablet Take 100 mg by mouth Daily.     • traZODone (DESYREL) 150 MG tablet TAKE 1 AND 1/2 TABLETS BY MOUTH AT BEDTIME 135 tablet 3   • triamcinolone (KENALOG) 0.1 % cream Apply  topically 2 (Two) Times a Day. 45 g 0   • benzonatate (TESSALON) 200 MG capsule Take 1 capsule by mouth 3 (Three) Times a Day As Needed for Cough. 45 capsule 0     No facility-administered medications prior to visit.        Patient Active Problem List   Diagnosis   • Chronic obstructive pulmonary disease   • Depression   • Dizziness of unknown cause   • Fatigue   • Viral URI   • Constipation   • Arthritis of right hip   • Tobacco abuse   • Status post total replacement of right hip   • Alcohol abuse, daily use   • Acute post-operative pain   • Acute blood loss anemia, mild, asymptomatic       Advance Care Planning:  has NO advance directive - information provided to the patient today    Identification of Risk Factors:  Risk factors include: tobacco use, chronic pain, depression and alcohol use.    Review of Systems   Constitutional: Negative.  Negative for activity change, appetite change, diaphoresis and unexpected weight change.   HENT: Negative.    Eyes: Negative.    Respiratory: Negative.  Negative for cough, chest tightness and shortness of breath.   "  Cardiovascular: Negative.    Gastrointestinal: Negative.    Endocrine: Negative.  Negative for cold intolerance and heat intolerance.   Genitourinary: Negative.    Musculoskeletal: Positive for arthralgias.   Skin: Negative.    Allergic/Immunologic: Positive for environmental allergies.   Neurological: Negative.  Negative for dizziness, syncope, weakness, light-headedness, numbness and headaches.   Hematological: Negative.    Psychiatric/Behavioral: Positive for dysphoric mood. Negative for sleep disturbance.       Compared to one year ago, the patient feels her physical health is better.  Compared to one year ago, the patient feels her mental health is better.    Objective     Physical Exam   Constitutional: She is oriented to person, place, and time. She appears well-developed and well-nourished.   HENT:   Head: Normocephalic.   Cardiovascular: Normal rate, regular rhythm and normal heart sounds.    Pulmonary/Chest: Effort normal and breath sounds normal.   Neurological: She is alert and oriented to person, place, and time.   Nursing note and vitals reviewed.      Vitals:    05/15/18 1404   BP: 140/70   Pulse: 100   Resp: 20   Temp: 98.2 °F (36.8 °C)   Weight: 44.9 kg (99 lb)   Height: 152.4 cm (60\")     Patient's Body mass index is 19.33 kg/m². BMI is below normal parameters. Recommendations include: no follow-up required.  Assessment/Plan   Patient Self-Management and Personalized Health Advice  The patient has been provided with information about: tobacco cessation and alcohol use and preventive services including:   · Advance directive, Pneumococcal vaccine .    Visit Diagnoses:    ICD-10-CM ICD-9-CM   1. Panlobular emphysema J43.1 492.8   2. Status post total replacement of right hip Z96.641 V43.64   3. Alcohol abuse, daily use F10.10 305.01   4. Tobacco abuse Z72.0 305.1   5. Immunization due Z23 V05.9   6. 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    Outpatient Encounter Prescriptions as of 5/15/2018   Medication Sig Dispense Refill   • albuterol (PROAIR HFA) 108 (90 Base) MCG/ACT inhaler Inhale 2 puffs Every 4 (Four) Hours As Needed for Wheezing. 18 g 5   • aspirin 81 MG tablet Take 1 tablet by mouth Daily. Resume in 1 month     • Calcium 600-400 MG-UNIT chewable tablet Chew 1 tablet daily.     • Cyanocobalamin (VITAMIN B 12 PO) Take  by mouth.     • docusate sodium 100 MG capsule Take 100 mg by mouth 2 (Two) Times a Day. 60 capsule 0   • DULoxetine (CYMBALTA) 60 MG capsule Take 1 capsule by mouth Daily. 30 capsule 6   • famotidine (PEPCID) 10 MG tablet Take 10 mg by mouth As Needed for Heartburn.     • loratadine (CLARITIN) 10 MG tablet Take 10 mg by mouth Daily.     • mometasone-formoterol (DULERA 200) 200-5 MCG/ACT inhaler Inhale 2 puffs 2 (Two) Times a Day. 1 g 0   • oxybutynin XL (DITROPAN-XL) 5 MG 24 hr tablet Take 1 tablet by mouth Daily. 30 tablet 5   • Potassium Gluconate 550 MG tablet Take 550 mg by mouth Daily.     • thiamine (VITAMIN B-1) 100 MG tablet Take 100 mg by mouth Daily.     • traZODone (DESYREL) 150 MG tablet TAKE 1 AND 1/2 TABLETS BY MOUTH AT BEDTIME 135 tablet 3   • triamcinolone (KENALOG) 0.1 % cream Apply  topically 2 (Two) Times a Day. 45 g 0   • [DISCONTINUED] benzonatate (TESSALON) 200 MG capsule Take 1 capsule by mouth 3 (Three) Times a Day As Needed for Cough. 45 capsule 0     Facility-Administered Encounter Medications as of 5/15/2018   Medication Dose Route Frequency Provider Last Rate Last Dose   • [COMPLETED] pneumococcal conj. 13-valent (PREVNAR-13) vaccine 0.5 mL  0.5 mL Intramuscular Once LORNA Dwyer   0.5 mL at 05/15/18 7696       Reviewed use of high risk medication in the elderly: yes  Reviewed for potential of harmful drug interactions in the elderly: yes    Follow Up:  Return in about 1 year (around 5/15/2019) for Medicare Wellness.     An After Visit Summary and  PPPS with all of these plans were given to the patient.      Health advise: healthy food choices with fresh fruits and vegetables, maintain sleep pattern at least 8 hours, avoid texting and distracted driving practices; wear safety belt, engage in regular exercise, maintain healthy weight, use safe sex practices, avoid alcohol and illicit drugs. Maintain immunizations that are up to date. Maintain health maintenance: DEXA, PSA, Pap, etc.  Follow up with PCP if struggling with depression or anxiety. Keep regular dental and eye exams. Brush and floss teeth daily.   F/U annually and prn.

## 2018-05-23 RX ORDER — DULOXETIN HYDROCHLORIDE 60 MG/1
60 CAPSULE, DELAYED RELEASE ORAL DAILY
Qty: 30 CAPSULE | Refills: 5 | Status: SHIPPED | OUTPATIENT
Start: 2018-05-23 | End: 2019-02-11 | Stop reason: SDUPTHER

## 2018-06-26 ENCOUNTER — OFFICE VISIT (OUTPATIENT)
Dept: FAMILY MEDICINE CLINIC | Facility: CLINIC | Age: 78
End: 2018-06-26

## 2018-06-26 VITALS
TEMPERATURE: 98 F | RESPIRATION RATE: 24 BRPM | HEIGHT: 60 IN | DIASTOLIC BLOOD PRESSURE: 62 MMHG | HEART RATE: 88 BPM | OXYGEN SATURATION: 98 % | SYSTOLIC BLOOD PRESSURE: 138 MMHG | WEIGHT: 99.8 LBS | BODY MASS INDEX: 19.59 KG/M2

## 2018-06-26 DIAGNOSIS — R07.89 CHEST TIGHTNESS OR PRESSURE: ICD-10-CM

## 2018-06-26 DIAGNOSIS — R06.02 SHORTNESS OF BREATH: ICD-10-CM

## 2018-06-26 DIAGNOSIS — J44.1 COPD EXACERBATION (HCC): Primary | ICD-10-CM

## 2018-06-26 DIAGNOSIS — J30.2 ACUTE SEASONAL ALLERGIC RHINITIS, UNSPECIFIED TRIGGER: ICD-10-CM

## 2018-06-26 PROCEDURE — 93000 ELECTROCARDIOGRAM COMPLETE: CPT | Performed by: NURSE PRACTITIONER

## 2018-06-26 PROCEDURE — 99214 OFFICE O/P EST MOD 30 MIN: CPT | Performed by: NURSE PRACTITIONER

## 2018-06-26 NOTE — PROGRESS NOTES
"Subjective   Shirley Calhoun is a 78 y.o. female.     Shortness of Breath   This is a chronic problem. The current episode started 1 to 4 weeks ago. The problem occurs daily. The problem has been unchanged. Associated symptoms include chest pain (occassional chest pain at rest, never has on exertion) and sputum production. Pertinent negatives include no abdominal pain, ear pain, fever, headaches, hemoptysis, leg swelling, rhinorrhea, sore throat, swollen glands, syncope, vomiting or wheezing. The symptoms are aggravated by smoke and weather changes. Risk factors include smoking. She has tried cool air and beta agonist inhalers for the symptoms. The treatment provided mild relief. Her past medical history is significant for allergies and chronic lung disease.      C/O increased SOA & fatigue x 1 month, SOA happens with walking and just sitting  Still smoking 1 PPD of cigarettes   Productive cough in the AM of clear/white sputum, c/o some chest \"heaviness\" , \"don't know how long it lasts\", \"maybe half an hour, goes away by itself\"  Fatigue & SOA has been gradually getting worse, interferes with ADLs & grocery shopping  No fever or chills or colored sputum  Cool air makes it better, using inhalers make SOA some better  Appetite remains poor  Alcohol consumption \"2-3 drinks daily of vodka, wine or beer\"; \"Whatever\"  Has been out of the Dulera x 2weeks, uses albuterol twice daily  C/O seasonal allergies with sinus congestion      The following portions of the patient's history were reviewed and updated as appropriate: allergies, current medications, past family history, past medical history, past social history, past surgical history and problem list.    Review of Systems   Constitutional: Positive for activity change and fatigue. Negative for appetite change and fever.   HENT: Positive for congestion, sinus pressure and sneezing. Negative for ear discharge, ear pain, postnasal drip, rhinorrhea and sore throat.    Eyes: " Negative.    Respiratory: Positive for cough, sputum production and shortness of breath. Negative for hemoptysis, chest tightness and wheezing.    Cardiovascular: Positive for chest pain (occassional chest pain at rest, never has on exertion). Negative for leg swelling and syncope.   Gastrointestinal: Positive for constipation. Negative for abdominal distention, abdominal pain, nausea, vomiting, GERD and indigestion.   Endocrine: Negative.    Genitourinary: Negative.    Musculoskeletal: Negative.    Skin: Negative.    Allergic/Immunologic: Positive for environmental allergies.   Neurological: Negative for dizziness and headache.   Hematological: Negative.    Psychiatric/Behavioral: Negative.        Objective   Physical Exam   Constitutional: She appears well-developed and well-nourished.   HENT:   Head: Normocephalic.   Right Ear: Hearing, tympanic membrane, external ear and ear canal normal.   Left Ear: Hearing, tympanic membrane, external ear and ear canal normal.   Nose: Mucosal edema (erythema of nasal mucosa) and congestion present. Right sinus exhibits maxillary sinus tenderness. Right sinus exhibits no frontal sinus tenderness. Left sinus exhibits maxillary sinus tenderness. Left sinus exhibits no frontal sinus tenderness.   Mouth/Throat: Uvula is midline, oropharynx is clear and moist and mucous membranes are normal.   Eyes: Conjunctivae and lids are normal. Lids are everted and swept, no foreign bodies found.   Neck: Trachea normal. No thyroid mass and no thyromegaly present.   Cardiovascular: Normal rate, regular rhythm, S1 normal, S2 normal and normal heart sounds.    Pulmonary/Chest: Accessory muscle usage present. Tachypnea noted. No respiratory distress. She has decreased breath sounds in the right upper field, the right middle field, the right lower field, the left upper field, the left middle field and the left lower field. She has no wheezes.   Abdominal: Soft. Normal appearance and bowel sounds are  normal.   Musculoskeletal: Normal range of motion.   Neurological: She is alert.   Skin: Skin is warm, dry and intact. Capillary refill takes 2 to 3 seconds. Turgor is normal.   Psychiatric: She has a normal mood and affect. Her speech is normal and behavior is normal. Thought content normal.   Nursing note and vitals reviewed.        Assessment/Plan   Shirley was seen today for shortness of breath.    Diagnoses and all orders for this visit:    COPD exacerbation    Shortness of breath  -     ECG 12 Lead    Chest tightness or pressure  -     ECG 12 Lead    Acute seasonal allergic rhinitis, unspecified trigger        ECG 12 Lead  Date/Time: 6/26/2018 3:09 PM  Performed by: DENISSE ARMENTA  Authorized by: DENISSE ARMENTA   Comparison: compared with previous ECG from 4/25/2017  Similar to previous ECG  Rhythm: sinus rhythm  Rate: normal  BPM: 76  Conduction: conduction normal  ST Segments: ST segments normal  T Waves: T waves normal  QRS axis: normal  Other: no other findings  Clinical impression: normal ECG        EKG normal, pt informed, comparable to previous  Smoking cessation discussed with pt but she is not ready to quit  Recommend NeliMed (sample given) nasal rinse as needed for congestion  Dulera samples given X 3  If difficulty breathing persists will refer to Pulmonary

## 2018-06-28 PROBLEM — J44.1 COPD EXACERBATION (HCC): Status: ACTIVE | Noted: 2018-06-28

## 2018-07-16 DIAGNOSIS — G47.00 INSOMNIA: ICD-10-CM

## 2018-07-18 RX ORDER — TRAZODONE HYDROCHLORIDE 150 MG/1
TABLET ORAL
Qty: 135 TABLET | Refills: 0 | Status: SHIPPED | OUTPATIENT
Start: 2018-07-18 | End: 2018-10-05 | Stop reason: SDUPTHER

## 2018-10-05 DIAGNOSIS — G47.00 INSOMNIA: ICD-10-CM

## 2018-10-05 RX ORDER — TRAZODONE HYDROCHLORIDE 150 MG/1
TABLET ORAL
Qty: 135 TABLET | Refills: 0 | Status: SHIPPED | OUTPATIENT
Start: 2018-10-05 | End: 2019-01-02 | Stop reason: SDUPTHER

## 2018-10-09 ENCOUNTER — OFFICE VISIT (OUTPATIENT)
Dept: FAMILY MEDICINE CLINIC | Facility: CLINIC | Age: 78
End: 2018-10-09

## 2018-10-09 VITALS
DIASTOLIC BLOOD PRESSURE: 84 MMHG | BODY MASS INDEX: 19.44 KG/M2 | SYSTOLIC BLOOD PRESSURE: 148 MMHG | HEART RATE: 76 BPM | RESPIRATION RATE: 18 BRPM | HEIGHT: 60 IN | WEIGHT: 99 LBS | TEMPERATURE: 97.1 F

## 2018-10-09 DIAGNOSIS — R50.9 CHILLS WITH FEVER: Primary | ICD-10-CM

## 2018-10-09 DIAGNOSIS — J44.1 COPD EXACERBATION (HCC): ICD-10-CM

## 2018-10-09 DIAGNOSIS — R82.998 URINE LEUKOCYTES: ICD-10-CM

## 2018-10-09 LAB
BILIRUB BLD-MCNC: ABNORMAL MG/DL
CLARITY, POC: CLEAR
COLOR UR: YELLOW
GLUCOSE UR STRIP-MCNC: NEGATIVE MG/DL
KETONES UR QL: NEGATIVE
LEUKOCYTE EST, POC: ABNORMAL
NITRITE UR-MCNC: NEGATIVE MG/ML
PH UR: 5 [PH] (ref 5–8)
PROT UR STRIP-MCNC: ABNORMAL MG/DL
RBC # UR STRIP: NEGATIVE /UL
SP GR UR: 1.01 (ref 1–1.03)
UROBILINOGEN UR QL: ABNORMAL

## 2018-10-09 PROCEDURE — 99214 OFFICE O/P EST MOD 30 MIN: CPT | Performed by: NURSE PRACTITIONER

## 2018-10-09 PROCEDURE — 81003 URINALYSIS AUTO W/O SCOPE: CPT | Performed by: NURSE PRACTITIONER

## 2018-10-09 RX ORDER — CIPROFLOXACIN 500 MG/1
500 TABLET, FILM COATED ORAL EVERY 12 HOURS SCHEDULED
Qty: 14 TABLET | Refills: 0 | Status: SHIPPED | OUTPATIENT
Start: 2018-10-09 | End: 2018-10-16

## 2018-10-09 NOTE — PROGRESS NOTES
Subjective   Shirley Calhoun is a 78 y.o. female.     History of Present Illness   Fever and chills, sweating  Wheezing some and cough, non productive  Using inhaler Albuterol inhaler but ran out of Dulera and could not get a refill  Taking OTC Claritin and Aspirin for fever  Feeling very tired    No burning with urination no back or abdominal pain  Does not feel like eating much, but drinks Ensure at times  Still smoking cigarettes and drinking alcohol daily      The following portions of the patient's history were reviewed and updated as appropriate: allergies, current medications, past family history, past medical history, past social history, past surgical history and problem list.    Review of Systems   Constitutional: Positive for activity change, appetite change, chills, fatigue and fever. Negative for unexpected weight gain and unexpected weight loss.   HENT: Negative.    Eyes: Negative.    Respiratory: Positive for cough and wheezing.    Cardiovascular: Negative.  Negative for chest pain, palpitations and leg swelling.   Gastrointestinal: Negative.  Negative for abdominal pain.   Endocrine: Negative.    Genitourinary: Negative for urinary incontinence, decreased urine volume, dysuria, hematuria and urgency.   Musculoskeletal: Positive for arthralgias.   Skin: Negative.    Allergic/Immunologic: Negative.    Neurological: Negative.    Psychiatric/Behavioral: Positive for depressed mood. Negative for sleep disturbance, suicidal ideas and stress. The patient is not nervous/anxious.        Objective   Physical Exam   Constitutional: She is oriented to person, place, and time. Vital signs are normal. She appears well-developed and well-nourished. No distress.   HENT:   Head: Normocephalic.   Right Ear: External ear normal.   Left Ear: External ear normal.   Nose: Nose normal.   Mouth/Throat: Oropharynx is clear and moist.   Neck: Neck supple. No JVD present. No thyromegaly present.   Cardiovascular: Normal rate,  regular rhythm, S1 normal, S2 normal and normal heart sounds.    Pulmonary/Chest: Effort normal. She has decreased breath sounds in the right upper field, the right middle field, the right lower field, the left upper field, the left middle field and the left lower field. She has no wheezes. She has no rhonchi. She has no rales.   Abdominal: Soft. Bowel sounds are normal. She exhibits no distension. There is no tenderness.   Musculoskeletal: Normal range of motion. She exhibits no edema or tenderness.   Lymphadenopathy:     She has no cervical adenopathy.   Neurological: She is alert and oriented to person, place, and time.   Skin: Skin is warm and dry.   Psychiatric: Her speech is normal and behavior is normal. Judgment and thought content normal. Cognition and memory are normal. She exhibits a depressed mood.   Nursing note and vitals reviewed.        Assessment/Plan   Shirley was seen today for cough.    Diagnoses and all orders for this visit:    Chills with fever  -     POCT urinalysis dipstick, automated    Urine leukocytes  -     Urine Culture - Urine, Urine, Clean Catch    COPD exacerbation (CMS/[facility])  -     mometasone-formoterol (DULERA) 100-5 MCG/ACT inhaler; Inhale 2 puffs 2 (Two) Times a Day.  -     ciprofloxacin (CIPRO) 500 MG tablet; Take 1 tablet by mouth Every 12 (Twelve) Hours.      Will refill Dulera inhaler use twice day  UA positive for moderate leuks will send for urine culture and start pt on cipro  To follow up if difficulty breathing or feeling worse go to ER pt agrees.

## 2018-10-09 NOTE — PATIENT INSTRUCTIONS
Urinary Tract Infection, Adult  A urinary tract infection (UTI) is an infection of any part of the urinary tract. The urinary tract includes the:  · Kidneys.  · Ureters.  · Bladder.  · Urethra.    These organs make, store, and get rid of pee (urine) in the body.  Follow these instructions at home:  · Take over-the-counter and prescription medicines only as told by your doctor.  · If you were prescribed an antibiotic medicine, take it as told by your doctor. Do not stop taking the antibiotic even if you start to feel better.  · Avoid the following drinks:  ? Alcohol.  ? Caffeine.  ? Tea.  ? Carbonated drinks.  · Drink enough fluid to keep your pee clear or pale yellow.  · Keep all follow-up visits as told by your doctor. This is important.  · Make sure to:  ? Empty your bladder often and completely. Do not to hold pee for long periods of time.  ? Empty your bladder before and after sex.  ? Wipe from front to back after a bowel movement if you are female. Use each tissue one time when you wipe.  Contact a doctor if:  · You have back pain.  · You have a fever.  · You feel sick to your stomach (nauseous).  · You throw up (vomit).  · Your symptoms do not get better after 3 days.  · Your symptoms go away and then come back.  Get help right away if:  · You have very bad back pain.  · You have very bad lower belly (abdominal) pain.  · You are throwing up and cannot keep down any medicines or water.  This information is not intended to replace advice given to you by your health care provider. Make sure you discuss any questions you have with your health care provider.  Document Released: 06/05/2009 Document Revised: 05/25/2017 Document Reviewed: 11/07/2016  [employer] Patient Education © 2018 [employer]

## 2018-10-11 LAB
BACTERIA UR CULT: NORMAL
BACTERIA UR CULT: NORMAL

## 2018-10-16 ENCOUNTER — OFFICE VISIT (OUTPATIENT)
Dept: FAMILY MEDICINE CLINIC | Facility: CLINIC | Age: 78
End: 2018-10-16

## 2018-10-16 VITALS — SYSTOLIC BLOOD PRESSURE: 132 MMHG | HEIGHT: 60 IN | DIASTOLIC BLOOD PRESSURE: 84 MMHG | HEART RATE: 76 BPM

## 2018-10-16 DIAGNOSIS — J44.1 COPD EXACERBATION (HCC): ICD-10-CM

## 2018-10-16 DIAGNOSIS — F33.1 MODERATE EPISODE OF RECURRENT MAJOR DEPRESSIVE DISORDER (HCC): ICD-10-CM

## 2018-10-16 DIAGNOSIS — R53.83 OTHER FATIGUE: ICD-10-CM

## 2018-10-16 DIAGNOSIS — R39.89 SUSPECTED UTI: Primary | ICD-10-CM

## 2018-10-16 PROCEDURE — 81003 URINALYSIS AUTO W/O SCOPE: CPT | Performed by: NURSE PRACTITIONER

## 2018-10-16 PROCEDURE — 99214 OFFICE O/P EST MOD 30 MIN: CPT | Performed by: NURSE PRACTITIONER

## 2018-10-16 RX ORDER — QUETIAPINE FUMARATE 50 MG/1
50 TABLET, FILM COATED ORAL NIGHTLY
Qty: 30 TABLET | Refills: 2 | Status: SHIPPED | OUTPATIENT
Start: 2018-10-16 | End: 2021-04-01

## 2018-10-16 NOTE — PROGRESS NOTES
Subjective   Shirley Calhoun is a 78 y.o. female.     History of Present Illness   Follow up UTI  Still not feeling 100%, took all antibiotics  No abdominal pain or back pain or fever or chills, no burning with urination, no blood in urine  Some constipation, taking Miralax, poor appetite    COPD  Out of her inhaler Dulera, went to pharmacy to get it and it cost too much  She usually gets samples to help her  Some chest tightness and cough.  Continues to smoke and drink alcohol    Feeling depressed and fatigued  Not sleeping well but taking Trazodone nightly.  Says she used to be on Adderall tx by her previous PCP but cannot find anyone to put her on it here  Has an appt with counselor but it is expensive and they do not take her insurance; wants something to make her feel better, has been on numerous medications for depression but cannot tell me what they are.      The following portions of the patient's history were reviewed and updated as appropriate: allergies, current medications, past family history, past medical history, past social history, past surgical history and problem list.    Review of Systems   Constitutional: Positive for appetite change, fatigue and unexpected weight loss. Negative for chills, fever and unexpected weight gain.   HENT: Negative.  Negative for congestion, postnasal drip, rhinorrhea, sinus pressure, sneezing and sore throat.    Eyes: Negative.    Respiratory: Positive for cough, chest tightness and wheezing. Negative for shortness of breath and stridor.    Cardiovascular: Negative.  Negative for chest pain, palpitations and leg swelling.   Gastrointestinal: Negative.  Negative for abdominal pain.   Endocrine: Negative.    Genitourinary: Negative.    Musculoskeletal: Negative.  Negative for back pain.   Skin: Negative.    Allergic/Immunologic: Negative.    Neurological: Negative.  Negative for dizziness, light-headedness and memory problem.   Hematological: Negative.     Psychiatric/Behavioral: Positive for sleep disturbance and depressed mood. Negative for self-injury and suicidal ideas.   All other systems reviewed and are negative.      Objective   Physical Exam   Constitutional: She is oriented to person, place, and time. She appears well-developed and well-nourished. No distress.   HENT:   Head: Normocephalic.   Nose: Nose normal.   Neck: Neck supple. No JVD present.   Cardiovascular: Normal rate, regular rhythm, S1 normal, S2 normal and normal heart sounds.  Exam reveals no gallop and no friction rub.    No murmur heard.  Pulmonary/Chest: Effort normal. No respiratory distress. She has decreased breath sounds in the right upper field, the right middle field, the right lower field, the left upper field, the left middle field and the left lower field. She has no wheezes. She has no rhonchi. She has no rales.   Musculoskeletal: Normal range of motion. She exhibits no edema.   Lymphadenopathy:     She has no cervical adenopathy.   Neurological: She is alert and oriented to person, place, and time.   Skin: Skin is warm and dry.   Psychiatric: She has a normal mood and affect. Her behavior is normal. Judgment and thought content normal.         Assessment/Plan   Shirley was seen today for urinary tract infection.    Diagnoses and all orders for this visit:    Suspected UTI  -     POCT urinalysis dipstick, automated    COPD exacerbation (CMS/HCC)    Other fatigue    Moderate episode of recurrent major depressive disorder (CMS/HCC)  -     QUEtiapine (SEROQUEL) 50 MG tablet; Take 1 tablet by mouth Every Night.    urine is normal no need for additional treatment  Will give samples of Symbicort 2 puffs twice day for COPD  Will try some Seroquel at night, avoid alcohol and this medication can cause drowsiness  Keep appt with counselor, may need to find a Psychiatrist

## 2018-10-17 LAB
BILIRUB BLD-MCNC: NEGATIVE MG/DL
CLARITY, POC: CLEAR
COLOR UR: YELLOW
GLUCOSE UR STRIP-MCNC: NEGATIVE MG/DL
KETONES UR QL: NEGATIVE
LEUKOCYTE EST, POC: NEGATIVE
NITRITE UR-MCNC: NEGATIVE MG/ML
PH UR: 5 [PH] (ref 5–8)
PROT UR STRIP-MCNC: NEGATIVE MG/DL
RBC # UR STRIP: ABNORMAL /UL
SP GR UR: 1.01 (ref 1–1.03)
UROBILINOGEN UR QL: NORMAL

## 2018-10-17 RX ORDER — BUDESONIDE AND FORMOTEROL FUMARATE DIHYDRATE 160; 4.5 UG/1; UG/1
2 AEROSOL RESPIRATORY (INHALATION)
Qty: 2 INHALER | Refills: 0 | COMMUNITY
Start: 2018-10-17 | End: 2019-07-01 | Stop reason: ALTCHOICE

## 2018-11-20 ENCOUNTER — OFFICE VISIT (OUTPATIENT)
Dept: FAMILY MEDICINE CLINIC | Facility: CLINIC | Age: 78
End: 2018-11-20

## 2018-11-20 VITALS
HEART RATE: 64 BPM | WEIGHT: 100 LBS | SYSTOLIC BLOOD PRESSURE: 112 MMHG | BODY MASS INDEX: 19.53 KG/M2 | TEMPERATURE: 98.7 F | RESPIRATION RATE: 16 BRPM | DIASTOLIC BLOOD PRESSURE: 68 MMHG

## 2018-11-20 DIAGNOSIS — D51.0 PERNICIOUS ANEMIA: ICD-10-CM

## 2018-11-20 DIAGNOSIS — E61.1 IRON DEFICIENCY: ICD-10-CM

## 2018-11-20 DIAGNOSIS — J01.00 ACUTE MAXILLARY SINUSITIS, RECURRENCE NOT SPECIFIED: ICD-10-CM

## 2018-11-20 DIAGNOSIS — N30.00 ACUTE CYSTITIS WITHOUT HEMATURIA: ICD-10-CM

## 2018-11-20 DIAGNOSIS — E55.9 VITAMIN D DEFICIENCY: ICD-10-CM

## 2018-11-20 DIAGNOSIS — R82.90 ABNORMAL URINE ODOR: ICD-10-CM

## 2018-11-20 DIAGNOSIS — R53.82 CHRONIC FATIGUE: Primary | ICD-10-CM

## 2018-11-20 LAB
BILIRUB BLD-MCNC: ABNORMAL MG/DL
CLARITY, POC: CLEAR
COLOR UR: YELLOW
GLUCOSE UR STRIP-MCNC: NEGATIVE MG/DL
KETONES UR QL: NEGATIVE
LEUKOCYTE EST, POC: ABNORMAL
NITRITE UR-MCNC: NEGATIVE MG/ML
PH UR: 5.5 [PH] (ref 5–8)
PROT UR STRIP-MCNC: ABNORMAL MG/DL
RBC # UR STRIP: NEGATIVE /UL
SP GR UR: 1.03 (ref 1–1.03)
UROBILINOGEN UR QL: NORMAL

## 2018-11-20 PROCEDURE — 99214 OFFICE O/P EST MOD 30 MIN: CPT | Performed by: PHYSICIAN ASSISTANT

## 2018-11-20 PROCEDURE — 81003 URINALYSIS AUTO W/O SCOPE: CPT | Performed by: PHYSICIAN ASSISTANT

## 2018-11-20 RX ORDER — AMOXICILLIN AND CLAVULANATE POTASSIUM 875; 125 MG/1; MG/1
1 TABLET, FILM COATED ORAL 2 TIMES DAILY
Qty: 14 TABLET | Refills: 0 | Status: SHIPPED | OUTPATIENT
Start: 2018-11-20 | End: 2019-01-16

## 2018-11-20 NOTE — PROGRESS NOTES
Subjective   Shirley Calhoun is a 78 y.o. female.     History of Present Illness   Pt presents with CC of sinus congestion, HA, sore throat, drainage, urine odor, and excessive fatigue  Hx of pernicious anemia and low iron. Not on any supplementation at this time   Hx of frequent UTI   Would like labs checked for fatigue   No N/V/D/F/c, chest pain, SOB or abdominal pain     The following portions of the patient's history were reviewed and updated as appropriate: allergies, current medications, past family history, past medical history, past social history, past surgical history and problem list.    Review of Systems   Constitutional: Positive for fatigue. Negative for chills, diaphoresis and fever.   HENT: Positive for congestion, postnasal drip, sinus pressure and sore throat. Negative for ear discharge, ear pain, hearing loss, nosebleeds and sneezing.    Eyes: Negative.    Respiratory: Negative.  Negative for cough, chest tightness, shortness of breath and wheezing.    Cardiovascular: Negative.  Negative for chest pain, palpitations and leg swelling.   Gastrointestinal: Negative for abdominal distention, abdominal pain, anal bleeding, blood in stool, constipation, diarrhea, nausea, rectal pain and vomiting.   Endocrine: Negative.  Negative for cold intolerance, heat intolerance, polydipsia, polyphagia and polyuria.   Genitourinary: Negative for difficulty urinating, dysuria, flank pain, frequency, hematuria and urgency.        Urine odor    Skin: Negative.  Negative for color change, pallor, rash and wound.   Allergic/Immunologic: Negative.  Negative for immunocompromised state.   Neurological: Negative for dizziness, syncope, weakness, light-headedness, numbness and headaches.   Hematological: Negative.  Negative for adenopathy. Does not bruise/bleed easily.       Objective    Blood pressure 112/68, pulse 64, temperature 98.7 °F (37.1 °C), temperature source Temporal, resp. rate 16, weight 45.4 kg (100 lb), not  currently breastfeeding.     Physical Exam   Constitutional: She is oriented to person, place, and time. She appears well-developed and well-nourished.   HENT:   Head: Normocephalic and atraumatic.   Right Ear: External ear and ear canal normal. Tympanic membrane is retracted. Tympanic membrane is not perforated, not erythematous and not bulging.   Left Ear: External ear and ear canal normal. Tympanic membrane is retracted. Tympanic membrane is not perforated, not erythematous and not bulging.   Nose: Mucosal edema present. Right sinus exhibits no maxillary sinus tenderness and no frontal sinus tenderness. Left sinus exhibits no maxillary sinus tenderness and no frontal sinus tenderness.   Mouth/Throat: Oropharynx is clear and moist. No oropharyngeal exudate, posterior oropharyngeal edema or posterior oropharyngeal erythema.   Eyes: Conjunctivae and EOM are normal. Pupils are equal, round, and reactive to light.   Neck: Normal range of motion. Neck supple. No tracheal deviation present. No thyromegaly present.   Cardiovascular: Normal rate, regular rhythm, normal heart sounds and intact distal pulses.   Pulmonary/Chest: Effort normal and breath sounds normal. No respiratory distress. She has no wheezes. She has no rales. She exhibits no tenderness.   Abdominal: Soft. Bowel sounds are normal. She exhibits no distension and no mass. There is no tenderness. There is no rebound and no guarding. No hernia.   Lymphadenopathy:     She has no cervical adenopathy.   Neurological: She is alert and oriented to person, place, and time. She has normal reflexes.   Skin: Skin is warm and dry.   Psychiatric: She has a normal mood and affect. Her behavior is normal. Judgment and thought content normal.   Nursing note and vitals reviewed.      Assessment/Plan   Shirley was seen today for sinus problem and fatigue.    Diagnoses and all orders for this visit:    Chronic fatigue  -     CBC & Differential  -     Comprehensive metabolic  panel  -     TSH  -     T4, free    Iron deficiency  -     Iron Profile    Pernicious anemia  -     Vitamin B12  -     Folate    Abnormal urine odor    Vitamin D deficiency  -     Vitamin D 25 Hydroxy    Acute cystitis without hematuria  -     amoxicillin-clavulanate (AUGMENTIN) 875-125 MG per tablet; Take 1 tablet by mouth 2 (Two) Times a Day.    Acute maxillary sinusitis, recurrence not specified  -     amoxicillin-clavulanate (AUGMENTIN) 875-125 MG per tablet; Take 1 tablet by mouth 2 (Two) Times a Day.    UA did show elevated leuks. Will send for culture. Cover for UTI and sinus issues with augmentin   Labs as outlined in plan for fatigue.   Pt asked for a steroid shot today, when asked why she states her old provider would give her one every fall. Discussed I did not think this was needed with current symptoms, but to follow up as needed.

## 2018-11-26 LAB
BACTERIA UR CULT: NORMAL
BACTERIA UR CULT: NORMAL

## 2019-01-02 DIAGNOSIS — G47.00 INSOMNIA: ICD-10-CM

## 2019-01-04 RX ORDER — TRAZODONE HYDROCHLORIDE 150 MG/1
TABLET ORAL
Qty: 135 TABLET | Refills: 1 | Status: SHIPPED | OUTPATIENT
Start: 2019-01-04 | End: 2019-06-30 | Stop reason: SDUPTHER

## 2019-01-16 ENCOUNTER — OFFICE VISIT (OUTPATIENT)
Dept: FAMILY MEDICINE CLINIC | Facility: CLINIC | Age: 79
End: 2019-01-16

## 2019-01-16 VITALS
RESPIRATION RATE: 24 BRPM | HEIGHT: 60 IN | BODY MASS INDEX: 19.83 KG/M2 | DIASTOLIC BLOOD PRESSURE: 60 MMHG | SYSTOLIC BLOOD PRESSURE: 115 MMHG | WEIGHT: 101 LBS | TEMPERATURE: 98 F | HEART RATE: 76 BPM

## 2019-01-16 DIAGNOSIS — R53.83 OTHER FATIGUE: Primary | ICD-10-CM

## 2019-01-16 DIAGNOSIS — J43.1 PANLOBULAR EMPHYSEMA (HCC): ICD-10-CM

## 2019-01-16 DIAGNOSIS — F32.4 MAJOR DEPRESSIVE DISORDER WITH SINGLE EPISODE, IN PARTIAL REMISSION (HCC): ICD-10-CM

## 2019-01-16 DIAGNOSIS — Z72.0 TOBACCO ABUSE: ICD-10-CM

## 2019-01-16 PROCEDURE — 99213 OFFICE O/P EST LOW 20 MIN: CPT | Performed by: NURSE PRACTITIONER

## 2019-01-16 NOTE — PROGRESS NOTES
Subjective   Shirley Calhoun is a 78 y.o. female.     History of Present Illness   Recent trip to see a friend, sleeping until noon, can't get anything done  Very tired hx of Vit B12 deficiency, ran out of supplement, was supposed to have labs done at recent office visit but did not  Needs a refill on inhalers would like samples due to cost of medication  COPD cough and congestion, productive cough, no runny nose or wheezing     The following portions of the patient's history were reviewed and updated as appropriate: allergies, current medications, past family history, past medical history, past social history, past surgical history and problem list.    Review of Systems   Constitutional: Positive for fatigue.   HENT: Negative.    Eyes: Negative.    Respiratory: Positive for cough and shortness of breath.    Cardiovascular: Negative.    Gastrointestinal: Negative.        Objective   Physical Exam   Constitutional: She appears well-developed and well-nourished.   HENT:   Head: Normocephalic.   Cardiovascular: Normal rate, regular rhythm and normal heart sounds.   Pulmonary/Chest: Effort normal and breath sounds normal.   Neurological: She is alert.   Nursing note and vitals reviewed.        Assessment/Plan   Shirley was seen today for soa & fatigue.    Diagnoses and all orders for this visit:    Other fatigue    Panlobular emphysema (CMS/HCC)    Major depressive disorder with single episode, in partial remission (CMS/HCC)    Tobacco abuse      Pt will get labs order for previous office visit and will notify pt of results  Will see if a referral to  can help with medication costs

## 2019-01-17 LAB
25(OH)D3+25(OH)D2 SERPL-MCNC: 25 NG/ML
ALBUMIN SERPL-MCNC: 4.52 G/DL (ref 3.2–4.8)
ALBUMIN/GLOB SERPL: 2.3 G/DL (ref 1.5–2.5)
ALP SERPL-CCNC: 60 U/L (ref 25–100)
ALT SERPL-CCNC: 30 U/L (ref 7–40)
AST SERPL-CCNC: 35 U/L (ref 0–33)
BASOPHILS # BLD AUTO: 0.04 10*3/MM3 (ref 0–0.2)
BASOPHILS NFR BLD AUTO: 0.8 % (ref 0–1)
BILIRUB SERPL-MCNC: 0.6 MG/DL (ref 0.3–1.2)
BUN SERPL-MCNC: 21 MG/DL (ref 9–23)
BUN/CREAT SERPL: 30 (ref 7–25)
CALCIUM SERPL-MCNC: 10 MG/DL (ref 8.7–10.4)
CHLORIDE SERPL-SCNC: 99 MMOL/L (ref 99–109)
CO2 SERPL-SCNC: 30 MMOL/L (ref 20–31)
CREAT SERPL-MCNC: 0.7 MG/DL (ref 0.6–1.3)
EOSINOPHIL # BLD AUTO: 0.17 10*3/MM3 (ref 0–0.3)
EOSINOPHIL NFR BLD AUTO: 3.3 % (ref 0–3)
ERYTHROCYTE [DISTWIDTH] IN BLOOD BY AUTOMATED COUNT: 12.6 % (ref 11.3–14.5)
FOLATE SERPL-MCNC: 7.38 NG/ML (ref 3.2–20)
GLOBULIN SER CALC-MCNC: 2 GM/DL
GLUCOSE SERPL-MCNC: 116 MG/DL (ref 70–100)
HCT VFR BLD AUTO: 42.2 % (ref 34.5–44)
HGB BLD-MCNC: 14 G/DL (ref 11.5–15.5)
IMM GRANULOCYTES # BLD AUTO: 0.02 10*3/MM3 (ref 0–0.03)
IMM GRANULOCYTES NFR BLD AUTO: 0.4 % (ref 0–0.6)
IRON SATN MFR SERPL: 57 % (ref 15–50)
IRON SERPL-MCNC: 168 MCG/DL (ref 50–175)
LYMPHOCYTES # BLD AUTO: 0.78 10*3/MM3 (ref 0.6–4.8)
LYMPHOCYTES NFR BLD AUTO: 15.1 % (ref 24–44)
MCH RBC QN AUTO: 34.9 PG (ref 27–31)
MCHC RBC AUTO-ENTMCNC: 33.2 G/DL (ref 32–36)
MCV RBC AUTO: 105.2 FL (ref 80–99)
MONOCYTES # BLD AUTO: 0.95 10*3/MM3 (ref 0–1)
MONOCYTES NFR BLD AUTO: 18.3 % (ref 0–12)
NEUTROPHILS # BLD AUTO: 3.22 10*3/MM3 (ref 1.5–8.3)
NEUTROPHILS NFR BLD AUTO: 62.1 % (ref 41–71)
PLATELET # BLD AUTO: 330 10*3/MM3 (ref 150–450)
POTASSIUM SERPL-SCNC: 4.3 MMOL/L (ref 3.5–5.5)
PROT SERPL-MCNC: 6.5 G/DL (ref 5.7–8.2)
RBC # BLD AUTO: 4.01 10*6/MM3 (ref 3.89–5.14)
SODIUM SERPL-SCNC: 136 MMOL/L (ref 132–146)
T4 FREE SERPL-MCNC: 1.18 NG/DL (ref 0.89–1.76)
TIBC SERPL-MCNC: 294 MCG/DL (ref 250–450)
TSH SERPL DL<=0.005 MIU/L-ACNC: 0.64 MIU/ML (ref 0.35–5.35)
UIBC SERPL-MCNC: 126 MCG/DL
VIT B12 SERPL-MCNC: >2000 PG/ML (ref 211–911)
WBC # BLD AUTO: 5.18 10*3/MM3 (ref 3.5–10.8)

## 2019-01-21 LAB
HBA1C MFR BLD: 5 % (ref 4.8–5.6)
Lab: NORMAL
WRITTEN AUTHORIZATION: NORMAL

## 2019-02-07 ENCOUNTER — OFFICE VISIT (OUTPATIENT)
Dept: FAMILY MEDICINE CLINIC | Facility: CLINIC | Age: 79
End: 2019-02-07

## 2019-02-07 VITALS
BODY MASS INDEX: 19.44 KG/M2 | HEIGHT: 60 IN | DIASTOLIC BLOOD PRESSURE: 65 MMHG | TEMPERATURE: 97.4 F | HEART RATE: 68 BPM | WEIGHT: 99 LBS | RESPIRATION RATE: 16 BRPM | SYSTOLIC BLOOD PRESSURE: 120 MMHG

## 2019-02-07 DIAGNOSIS — Z87.19 HISTORY OF SMALL BOWEL OBSTRUCTION: ICD-10-CM

## 2019-02-07 DIAGNOSIS — R13.10 DIFFICULTY SWALLOWING SOLIDS: Primary | ICD-10-CM

## 2019-02-07 DIAGNOSIS — F10.10 ALCOHOL ABUSE, DAILY USE: ICD-10-CM

## 2019-02-07 DIAGNOSIS — K92.1 MELENA: ICD-10-CM

## 2019-02-07 PROCEDURE — 99214 OFFICE O/P EST MOD 30 MIN: CPT | Performed by: NURSE PRACTITIONER

## 2019-02-12 ENCOUNTER — TELEPHONE (OUTPATIENT)
Dept: FAMILY MEDICINE CLINIC | Facility: CLINIC | Age: 79
End: 2019-02-12

## 2019-02-12 RX ORDER — OXYBUTYNIN CHLORIDE 5 MG/1
5 TABLET, EXTENDED RELEASE ORAL DAILY
Qty: 90 TABLET | Refills: 1 | Status: SHIPPED | OUTPATIENT
Start: 2019-02-12 | End: 2021-04-01

## 2019-02-12 RX ORDER — DULOXETIN HYDROCHLORIDE 60 MG/1
60 CAPSULE, DELAYED RELEASE ORAL DAILY
Qty: 90 CAPSULE | Refills: 1 | Status: SHIPPED | OUTPATIENT
Start: 2019-02-12 | End: 2019-09-12 | Stop reason: SDUPTHER

## 2019-02-12 NOTE — TELEPHONE ENCOUNTER
----- Message from Emma Davalos sent at 2/12/2019  1:20 PM EST -----  Contact: DENISSE  PATIENT SAYS SHE IS GOING TO GET HER EGD AND COLONOSCOPY BY DR LOERA. PATIENT JUST WANTED TO LET YOU KNOW.  [phone]

## 2019-02-13 ENCOUNTER — OFFICE VISIT (OUTPATIENT)
Dept: FAMILY MEDICINE CLINIC | Facility: CLINIC | Age: 79
End: 2019-02-13

## 2019-02-13 VITALS
WEIGHT: 101 LBS | HEART RATE: 80 BPM | SYSTOLIC BLOOD PRESSURE: 130 MMHG | BODY MASS INDEX: 19.83 KG/M2 | TEMPERATURE: 97.8 F | HEIGHT: 60 IN | DIASTOLIC BLOOD PRESSURE: 60 MMHG | RESPIRATION RATE: 16 BRPM

## 2019-02-13 DIAGNOSIS — K59.00 CONSTIPATION, UNSPECIFIED CONSTIPATION TYPE: ICD-10-CM

## 2019-02-13 DIAGNOSIS — F10.10 ALCOHOL ABUSE, DAILY USE: ICD-10-CM

## 2019-02-13 DIAGNOSIS — Z01.818 PRE-OP EVALUATION: Primary | ICD-10-CM

## 2019-02-13 DIAGNOSIS — R13.10 DIFFICULTY SWALLOWING SOLIDS: ICD-10-CM

## 2019-02-13 DIAGNOSIS — C50.912 MALIGNANT NEOPLASM OF LEFT FEMALE BREAST, UNSPECIFIED ESTROGEN RECEPTOR STATUS, UNSPECIFIED SITE OF BREAST (HCC): ICD-10-CM

## 2019-02-13 DIAGNOSIS — J44.1 COPD EXACERBATION (HCC): ICD-10-CM

## 2019-02-13 DIAGNOSIS — K92.1 MELENA: ICD-10-CM

## 2019-02-13 DIAGNOSIS — F32.4 MAJOR DEPRESSIVE DISORDER WITH SINGLE EPISODE, IN PARTIAL REMISSION (HCC): ICD-10-CM

## 2019-02-13 LAB
BASOPHILS # BLD AUTO: 0.01 10*3/MM3 (ref 0–0.2)
BASOPHILS NFR BLD AUTO: 0.2 % (ref 0–1)
BUN SERPL-MCNC: 12 MG/DL (ref 9–23)
BUN/CREAT SERPL: 16.7 (ref 7–25)
CALCIUM SERPL-MCNC: 9.7 MG/DL (ref 8.7–10.4)
CHLORIDE SERPL-SCNC: 104 MMOL/L (ref 99–109)
CO2 SERPL-SCNC: 33 MMOL/L (ref 20–31)
CREAT SERPL-MCNC: 0.72 MG/DL (ref 0.6–1.3)
DIFFERENTIAL COMMENT: NORMAL
EOSINOPHIL # BLD AUTO: 0.18 10*3/MM3 (ref 0–0.3)
EOSINOPHIL NFR BLD AUTO: 4.3 % (ref 0–3)
ERYTHROCYTE [DISTWIDTH] IN BLOOD BY AUTOMATED COUNT: 13.2 % (ref 11.3–14.5)
GLUCOSE SERPL-MCNC: 104 MG/DL (ref 70–100)
HCT VFR BLD AUTO: 38.8 % (ref 34.5–44)
HGB BLD-MCNC: 13.1 G/DL (ref 11.5–15.5)
IMM GRANULOCYTES # BLD AUTO: 0.01 10*3/MM3 (ref 0–0.05)
IMM GRANULOCYTES NFR BLD AUTO: 0.2 % (ref 0–0.6)
LYMPHOCYTES # BLD AUTO: 0.68 10*3/MM3 (ref 0.6–4.8)
LYMPHOCYTES NFR BLD AUTO: 16.4 % (ref 24–44)
MCH RBC QN AUTO: 36 PG (ref 27–31)
MCHC RBC AUTO-ENTMCNC: 33.8 G/DL (ref 32–36)
MCV RBC AUTO: 106.6 FL (ref 80–99)
MONOCYTES # BLD AUTO: 0.84 10*3/MM3 (ref 0–1)
MONOCYTES NFR BLD AUTO: 20.3 % (ref 0–12)
NEUTROPHILS # BLD AUTO: 2.42 10*3/MM3 (ref 1.5–8.3)
NEUTROPHILS NFR BLD AUTO: 58.6 % (ref 41–71)
PLATELET # BLD AUTO: 276 10*3/MM3 (ref 150–450)
PLATELET BLD QL SMEAR: NORMAL
POTASSIUM SERPL-SCNC: 3.9 MMOL/L (ref 3.5–5.5)
RBC # BLD AUTO: 3.64 10*6/MM3 (ref 3.89–5.14)
RBC MORPH BLD: NORMAL
SODIUM SERPL-SCNC: 140 MMOL/L (ref 132–146)
WBC # BLD AUTO: 4.14 10*3/MM3 (ref 3.5–10.8)

## 2019-02-13 PROCEDURE — 93000 ELECTROCARDIOGRAM COMPLETE: CPT | Performed by: NURSE PRACTITIONER

## 2019-02-13 PROCEDURE — 99214 OFFICE O/P EST MOD 30 MIN: CPT | Performed by: NURSE PRACTITIONER

## 2019-02-13 NOTE — PROGRESS NOTES
Subjective   Shirley Calhoun is a 78 y.o. female.     History of Present Illness   Pre op clearance prior to colonoscopy and EGD by Dr Jama   Pt has decided to have her surgeon do her colonoscopy and EGD (instead of colorectal group) so that if she needs a dilation of her esophagus she trusts her, she was her surgeon for bilateral mastectomy for breast cancer.     She has been having difficulty swallowing solid food for several months, then started having melena and LLQ abdominal discomfort. She has a hx of bowel obstruction with colon resection. She has been living off of Boost liquid supplements.   She has a long hx of daily alcohol use (Vodka) and tobacco abuse     PMH: Breast cancer with bilateral mastectomy 2, hx of subdural hematoma, COPD, GERD, arthritis, constipation, depression, insomnia, anemia, osteoporosis with hx of fracture, colon polyps, diverticulosis    PSH: bowel resection, brain aneurysm, back surgery, bilateral mastectomy 6/27/2011, right hip replacement 8/2017, right knee surgery, cataract surgery bilateral.     Pt has tolerated general anesthesia past surgeries. Denies dizziness, CP, swelling, difficulty breathing, no bleeding problems, no hx of PE, DVT or CVA or MI.    She lives alone but has found a friend that will be taking to and from her procedure.    The following portions of the patient's history were reviewed and updated as appropriate: allergies, current medications, past family history, past medical history, past social history, past surgical history and problem list.    Review of Systems   Constitutional: Positive for appetite change and fatigue. Negative for unexpected weight gain and unexpected weight loss.   HENT: Positive for trouble swallowing. Negative for sore throat.    Respiratory: Positive for cough, chest tightness, shortness of breath and wheezing.    Cardiovascular: Negative for chest pain, palpitations and leg swelling.   Gastrointestinal: Positive for abdominal pain,  blood in stool, GERD and indigestion.   Genitourinary: Negative.  Negative for decreased urine volume, dysuria, flank pain, frequency, hematuria, pelvic pain, urgency and vaginal pain.   Musculoskeletal: Negative.    Skin: Negative.    Neurological: Negative for dizziness, seizures, light-headedness, headache, memory problem and confusion.   Hematological: Negative for adenopathy.   Psychiatric/Behavioral: Positive for sleep disturbance and depressed mood. Negative for suicidal ideas and stress. The patient is nervous/anxious.        Objective   Physical Exam   Constitutional: She is oriented to person, place, and time. She appears well-developed and well-nourished. No distress.   Thin female in no apparent distress     HENT:   Head: Normocephalic.   Right Ear: External ear normal.   Left Ear: External ear normal.   Nose: Nose normal.   Mouth/Throat: Oropharynx is clear and moist.   Eyes: Conjunctivae are normal. Pupils are equal, round, and reactive to light.   Neck: Normal range of motion. Neck supple. No JVD present. No thyromegaly present.   Cardiovascular: Normal rate, regular rhythm, normal heart sounds and intact distal pulses. Exam reveals no gallop and no friction rub.   No murmur heard.  Pulmonary/Chest: Effort normal and breath sounds normal. No stridor. No respiratory distress. She has no wheezes. She has no rales.   Bilateral mastectomy     Abdominal: Soft. Normal appearance and bowel sounds are normal. She exhibits abdominal bruit. There is no hepatosplenomegaly. There is tenderness in the left lower quadrant. There is no rigidity, no rebound, no guarding and no CVA tenderness. No hernia.   Musculoskeletal: She exhibits no edema or tenderness.   Lymphadenopathy:     She has no cervical adenopathy.   Neurological: She is alert and oriented to person, place, and time. She displays normal reflexes. She exhibits normal muscle tone.   Skin: Skin is warm and dry. Capillary refill takes less than 2 seconds.  No rash noted. She is not diaphoretic.   Psychiatric: She has a normal mood and affect. Her behavior is normal. Judgment and thought content normal.   Nursing note and vitals reviewed.      ECG 12 Lead  Date/Time: 2/13/2019 4:27 PM  Performed by: Lara Kline APRN  Authorized by: Lara Kline APRN   Rhythm: sinus rhythm  Rate: normal  BPM: 79  Conduction: conduction normal  QRS axis: low voltage.  Other: no other findings    Clinical impression: non-specific ECG  Comments: Low QRS voltages in limb leads            Assessment/Plan   Shirley was seen today for pre op clearance.    Diagnoses and all orders for this visit:    Pre-op evaluation  -     CBC & Differential  -     Basic metabolic panel  -     Manual Differential  -     ECG 12 Lead    COPD exacerbation (CMS/HCC)    Difficulty swallowing solids    Melena    Constipation, unspecified constipation type    Malignant neoplasm of left female breast, unspecified estrogen receptor status, unspecified site of breast (CMS/HCC)    Alcohol abuse, daily use    Major depressive disorder with single episode, in partial remission (CMS/HCC)    Will check labs CBC, CMP prior to procedure and forward result to Dr Moscoso.  Labs are normal  Pt is low risk of MI.  EKG normal with no acute changes  Pt is cleared for colonoscopy, EGD and surgical procedure if required.

## 2019-02-14 PROBLEM — C50.912 MALIGNANT NEOPLASM OF LEFT FEMALE BREAST: Status: ACTIVE | Noted: 2019-02-14

## 2019-02-17 PROBLEM — Z87.19 HISTORY OF SMALL BOWEL OBSTRUCTION: Status: ACTIVE | Noted: 2019-02-17

## 2019-02-17 PROBLEM — M81.0 AGE-RELATED OSTEOPOROSIS WITHOUT CURRENT PATHOLOGICAL FRACTURE: Status: ACTIVE | Noted: 2019-02-17

## 2019-02-17 PROBLEM — Z90.49 HISTORY OF COLON RESECTION: Status: ACTIVE | Noted: 2019-02-17

## 2019-02-17 PROBLEM — Z86.010 HISTORY OF COLON POLYPS: Status: ACTIVE | Noted: 2019-02-17

## 2019-02-17 PROBLEM — Z87.828 H/O TRAUMATIC SUBDURAL HEMATOMA: Status: ACTIVE | Noted: 2019-02-17

## 2019-05-14 ENCOUNTER — OFFICE VISIT (OUTPATIENT)
Dept: FAMILY MEDICINE CLINIC | Facility: CLINIC | Age: 79
End: 2019-05-14

## 2019-05-14 VITALS
HEART RATE: 84 BPM | WEIGHT: 102 LBS | SYSTOLIC BLOOD PRESSURE: 110 MMHG | DIASTOLIC BLOOD PRESSURE: 70 MMHG | BODY MASS INDEX: 19.92 KG/M2 | RESPIRATION RATE: 18 BRPM | TEMPERATURE: 97.3 F

## 2019-05-14 DIAGNOSIS — K29.00 OTHER ACUTE GASTRITIS WITHOUT HEMORRHAGE: Primary | ICD-10-CM

## 2019-05-14 PROBLEM — C44.319 BASAL CELL CARCINOMA (BCC) OF CHEEK: Status: ACTIVE | Noted: 2019-05-14

## 2019-05-14 PROCEDURE — 99213 OFFICE O/P EST LOW 20 MIN: CPT | Performed by: NURSE PRACTITIONER

## 2019-05-14 RX ORDER — FAMOTIDINE 10 MG
10 TABLET ORAL 2 TIMES DAILY
Qty: 24 TABLET | Refills: 0
Start: 2019-05-14 | End: 2019-05-23 | Stop reason: SDUPTHER

## 2019-05-14 NOTE — PROGRESS NOTES
Subjective   Shirley Calhoun is a 79 y.o. female.     History of Present Illness   Stomach pains burping and belching for the past week. Vomiting at times. No blood noted in vomitus.  Pepcid OTC does help some  Taking Pepto bismol also  Had EGD and colonoscopy by Dr Jama in February 2019 showed mild gastritis and a polyp was sent for evaluation.  No fever or chills, no change in appetite or weight    Skin lesion on left cheek  Went to see Dr Osman has basal cell lesion  Has been referred to a plastic surgeon (does not have the appt yet)    The following portions of the patient's history were reviewed and updated as appropriate: allergies, current medications, past family history, past medical history, past social history, past surgical history and problem list.    Review of Systems   Constitutional: Positive for appetite change. Negative for activity change, chills and fever.   HENT: Negative.    Respiratory: Positive for cough and chest tightness. Negative for shortness of breath and wheezing.    Cardiovascular: Negative.    Gastrointestinal: Positive for abdominal pain (upper ) and indigestion. Negative for nausea and vomiting.   Genitourinary: Negative for urinary incontinence.   Musculoskeletal: Negative.    Skin: Positive for skin lesions.   Allergic/Immunologic: Negative.    Neurological: Negative.    Hematological: Negative.    Psychiatric/Behavioral: Positive for sleep disturbance. Negative for depressed mood. The patient is not nervous/anxious.        Objective   Physical Exam   Constitutional: She is oriented to person, place, and time. She appears well-developed and well-nourished. No distress.   HENT:   Head: Normocephalic.   Nose: Nose normal.   Neck: Neck supple.   Cardiovascular: Normal rate, regular rhythm and normal heart sounds.   No murmur heard.  Pulmonary/Chest: Effort normal and breath sounds normal. No stridor. No respiratory distress. She has no rales.   Abdominal: Soft. Bowel sounds are  normal. She exhibits no distension and no mass. There is no tenderness. There is no guarding.   Musculoskeletal: She exhibits no edema.   Neurological: She is alert and oriented to person, place, and time.   Skin: Skin is warm and dry. She is not diaphoretic.   Small white skin lesion on left cheek    Psychiatric: She has a normal mood and affect. Her behavior is normal. Judgment and thought content normal.   Nursing note and vitals reviewed.        Assessment/Plan   Shirley was seen today for abdominal pain.    Diagnoses and all orders for this visit:    Other acute gastritis without hemorrhage  -     famotidine (PEPCID AC) 10 MG tablet; Take 1 tablet by mouth 2 (Two) Times a Day.    Recommend pt continue OTC Pepcid that she has been taking for stomach.   Avoid spicy foods, caffeine, alcohol etc things that make sx worse.     Keep appt with plastic surgeon for removal of basal cell on left cheek.   F/U if not improving. Pt agrees

## 2019-05-23 ENCOUNTER — OFFICE VISIT (OUTPATIENT)
Dept: FAMILY MEDICINE CLINIC | Facility: CLINIC | Age: 79
End: 2019-05-23

## 2019-05-23 VITALS
WEIGHT: 103 LBS | RESPIRATION RATE: 16 BRPM | TEMPERATURE: 97.8 F | BODY MASS INDEX: 20.12 KG/M2 | HEART RATE: 86 BPM | SYSTOLIC BLOOD PRESSURE: 126 MMHG | DIASTOLIC BLOOD PRESSURE: 84 MMHG

## 2019-05-23 DIAGNOSIS — K64.9 ACUTE HEMORRHOID: ICD-10-CM

## 2019-05-23 DIAGNOSIS — R39.198 DIFFICULTY URINATING: ICD-10-CM

## 2019-05-23 DIAGNOSIS — R19.5 DARK STOOLS: Primary | ICD-10-CM

## 2019-05-23 DIAGNOSIS — K29.00 OTHER ACUTE GASTRITIS WITHOUT HEMORRHAGE: ICD-10-CM

## 2019-05-23 LAB
BILIRUB BLD-MCNC: NEGATIVE MG/DL
CLARITY, POC: CLEAR
COLOR UR: YELLOW
GLUCOSE UR STRIP-MCNC: NEGATIVE MG/DL
KETONES UR QL: NEGATIVE
LEUKOCYTE EST, POC: ABNORMAL
NITRITE UR-MCNC: NEGATIVE MG/ML
PH UR: 5 [PH] (ref 5–8)
PROT UR STRIP-MCNC: NEGATIVE MG/DL
RBC # UR STRIP: NEGATIVE /UL
SP GR UR: 1.02 (ref 1–1.03)
UROBILINOGEN UR QL: NORMAL

## 2019-05-23 PROCEDURE — 81003 URINALYSIS AUTO W/O SCOPE: CPT | Performed by: NURSE PRACTITIONER

## 2019-05-23 PROCEDURE — 99214 OFFICE O/P EST MOD 30 MIN: CPT | Performed by: NURSE PRACTITIONER

## 2019-05-23 RX ORDER — PANTOPRAZOLE SODIUM 40 MG/1
40 TABLET, DELAYED RELEASE ORAL DAILY
Qty: 30 TABLET | Refills: 0 | Status: SHIPPED | OUTPATIENT
Start: 2019-05-23 | End: 2019-08-14 | Stop reason: SDUPTHER

## 2019-05-23 NOTE — PROGRESS NOTES
Subjective   Shirley Calhoun is a 79 y.o. female.     History of Present Illness   Went to [facility] ER 2 days ago due to dark stools  Bleeding from rectum, lower abdominal pain, nausea but no vomiting, belching and burping, tenderness in upper abdomen  Burning sensation in rectal area  + hx of internal hemorrhoids  Taking a lot of Pepto bismol daily to help with stomach pains, did not know that would make her stools very dark  Cutting back on vodka and cigarettes  Has not been eating very much, feeling weak due to not eating  At ER says labs and abdominal scan were all normal.    A few days ago was having difficulty urinating, drinking a lot of water to flush out kidneys  Thinks she might have a UTI but urine was checked at ER and did not tell her the results  ER records were not available at time of appointment    Recent EGD and colonoscopy showed some gastritis but no other abnormality  Pt not taking any acid reflux medication at this time  The following portions of the patient's history were reviewed and updated as appropriate: allergies, current medications, past family history, past medical history, past social history, past surgical history and problem list.    Review of Systems   Constitutional: Positive for appetite change and fatigue. Negative for chills, fever, unexpected weight gain and unexpected weight loss.   HENT: Negative.  Negative for congestion, rhinorrhea, sore throat and trouble swallowing.    Respiratory: Negative.  Negative for cough, chest tightness, shortness of breath and wheezing.    Gastrointestinal: Positive for abdominal pain, blood in stool (dark stool no foul odor, no bright red blood), nausea, rectal pain (hemorrhoid irritation) and indigestion. Negative for constipation, diarrhea and vomiting.   Genitourinary: Positive for frequency. Negative for urinary incontinence, hematuria, pelvic pain and urgency.   Musculoskeletal: Negative for back pain.   Skin: Negative.  Negative for pallor.    Neurological: Positive for weakness. Negative for dizziness, light-headedness and headache.   Hematological: Negative for adenopathy. Does not bruise/bleed easily.   Psychiatric/Behavioral: Negative for sleep disturbance, depressed mood and stress. The patient is nervous/anxious (worried that she is dying due to dark stool).        Objective   Physical Exam   Constitutional: She is oriented to person, place, and time. She appears well-developed. No distress.   Thin female   HENT:   Head: Normocephalic.   Nose: Nose normal.   Eyes: Lids are normal.   Neck: Neck supple. No JVD present. No thyromegaly present.   Cardiovascular: Normal rate, regular rhythm and normal heart sounds.   No murmur heard.  Pulmonary/Chest: Effort normal and breath sounds normal.   Abdominal: Soft. Normal appearance and bowel sounds are normal. She exhibits no distension and no mass. There is no hepatosplenomegaly. There is tenderness in the epigastric area. There is no rigidity, no rebound, no guarding and no CVA tenderness. No hernia.   Neurological: She is alert and oriented to person, place, and time.   Skin: Skin is warm and dry. Capillary refill takes less than 2 seconds. She is not diaphoretic. No pallor.   Psychiatric: She has a normal mood and affect. Her behavior is normal. Judgment and thought content normal.   Nursing note and vitals reviewed.        Assessment/Plan   Shirley was seen today for follow-up.    Diagnoses and all orders for this visit:    Dark stools  -     pantoprazole (PROTONIX) 40 MG EC tablet; Take 1 tablet by mouth Daily.    Acute hemorrhoid    Difficulty urinating  -     POCT urinalysis dipstick, automated    Other acute gastritis without hemorrhage  -     pantoprazole (PROTONIX) 40 MG EC tablet; Take 1 tablet by mouth Daily.    will have pt stop taking Pepto bismol due to the aspirin and can make stool dark colored  Take Pantoprazole for the next 14 days   Continue cutting back on alcohol and cigarettes  Advance  diet as tolerated.  Use topical hemorrhoid cream and sitz baths as tolerated in warm water  UA normal no need for antibiotics at this time.  F/U if needed. Pt agrees.

## 2019-06-30 DIAGNOSIS — G47.00 INSOMNIA: ICD-10-CM

## 2019-06-30 DIAGNOSIS — J43.1 PANLOBULAR EMPHYSEMA (HCC): ICD-10-CM

## 2019-07-01 ENCOUNTER — OFFICE VISIT (OUTPATIENT)
Dept: FAMILY MEDICINE CLINIC | Facility: CLINIC | Age: 79
End: 2019-07-01

## 2019-07-01 VITALS
DIASTOLIC BLOOD PRESSURE: 66 MMHG | TEMPERATURE: 97.5 F | HEIGHT: 60 IN | SYSTOLIC BLOOD PRESSURE: 102 MMHG | BODY MASS INDEX: 19.73 KG/M2 | RESPIRATION RATE: 20 BRPM | WEIGHT: 100.5 LBS | HEART RATE: 80 BPM | OXYGEN SATURATION: 98 %

## 2019-07-01 DIAGNOSIS — J06.9 ACUTE URI: Primary | ICD-10-CM

## 2019-07-01 PROCEDURE — 99213 OFFICE O/P EST LOW 20 MIN: CPT | Performed by: FAMILY MEDICINE

## 2019-07-01 RX ORDER — ALBUTEROL SULFATE 90 UG/1
AEROSOL, METERED RESPIRATORY (INHALATION)
Qty: 18 G | Refills: 0 | Status: SHIPPED | OUTPATIENT
Start: 2019-07-01 | End: 2020-05-29

## 2019-07-01 RX ORDER — TRAZODONE HYDROCHLORIDE 150 MG/1
TABLET ORAL
Qty: 135 TABLET | Refills: 0 | Status: SHIPPED | OUTPATIENT
Start: 2019-07-01 | End: 2019-09-12 | Stop reason: SDUPTHER

## 2019-07-01 RX ORDER — POLYETHYLENE GLYCOL 3350 17 G/17G
17 POWDER, FOR SOLUTION ORAL DAILY
COMMUNITY
End: 2022-01-01

## 2019-07-01 RX ORDER — METHYLPREDNISOLONE 4 MG/1
TABLET ORAL
Qty: 21 EACH | Refills: 0 | Status: SHIPPED | OUTPATIENT
Start: 2019-07-01 | End: 2019-08-14

## 2019-07-01 RX ORDER — AZITHROMYCIN 250 MG/1
TABLET, FILM COATED ORAL
Qty: 6 TABLET | Refills: 0 | Status: SHIPPED | OUTPATIENT
Start: 2019-07-01 | End: 2019-08-14

## 2019-07-01 NOTE — PATIENT INSTRUCTIONS
"Go to the nearest ER or return to clinic if symptoms worsen, fever/chill develop    Upper Respiratory Infection, Adult  An upper respiratory infection (URI) affects the nose, throat, and upper air passages. URIs are caused by germs (viruses). The most common type of URI is often called \"the common cold.\"  Medicines cannot cure URIs, but you can do things at home to relieve your symptoms. URIs usually get better within 7-10 days.  Follow these instructions at home:  Activity  · Rest as needed.  · If you have a fever, stay home from work or school until your fever is gone, or until your doctor says you may return to work or school.  ? You should stay home until you cannot spread the infection anymore (you are not contagious).  ? Your doctor may have you wear a face mask so you have less risk of spreading the infection.  Relieving symptoms  · Gargle with a salt-water mixture 3-4 times a day or as needed. To make a salt-water mixture, completely dissolve ½-1 tsp of salt in 1 cup of warm water.  · Use a cool-mist humidifier to add moisture to the air. This can help you breathe more easily.  Eating and drinking  · Drink enough fluid to keep your pee (urine) pale yellow.  · Eat soups and other clear broths.  General instructions  · Take over-the-counter and prescription medicines only as told by your doctor. These include cold medicines, fever reducers, and cough suppressants.  · Do not use any products that contain nicotine or tobacco. These include cigarettes and e-cigarettes. If you need help quitting, ask your doctor.  · Avoid being where people are smoking (avoid secondhand smoke).  · Make sure you get regular shots and get the flu shot every year.  · Keep all follow-up visits as told by your doctor. This is important.  How to avoid spreading infection to others  · Wash your hands often with soap and water. If you do not have soap and water, use hand .  · Avoid touching your mouth, face, eyes, or " "nose.  · Cough or sneeze into a tissue or your sleeve or elbow. Do not cough or sneeze into your hand or into the air.  Contact a doctor if:  · You are getting worse, not better.  · You have any of these:  ? A fever.  ? Chills.  ? Brown or red mucus in your nose.  ? Yellow or brown fluid (discharge)coming from your nose.  ? Pain in your face, especially when you bend forward.  ? Swollen neck glands.  ? Pain with swallowing.  ? White areas in the back of your throat.  Get help right away if:  · You have shortness of breath that gets worse.  · You have very bad or constant:  ? Headache.  ? Ear pain.  ? Pain in your forehead, behind your eyes, and over your cheekbones (sinus pain).  ? Chest pain.  · You have long-lasting (chronic) lung disease along with any of these:  ? Wheezing.  ? Long-lasting cough.  ? Coughing up blood.  ? A change in your usual mucus.  · You have a stiff neck.  · You have changes in your:  ? Vision.  ? Hearing.  ? Thinking.  ? Mood.  Summary  · An upper respiratory infection (URI) is caused by a germ called a virus. The most common type of URI is often called \"the common cold.\"  · URIs usually get better within 7-10 days.  · Take over-the-counter and prescription medicines only as told by your doctor.  This information is not intended to replace advice given to you by your health care provider. Make sure you discuss any questions you have with your health care provider.  Document Released: 06/05/2009 Document Revised: 08/10/2018 Document Reviewed: 08/10/2018  Elsevier Interactive Patient Education © 2019 [employer]    "

## 2019-07-01 NOTE — PROGRESS NOTES
Subjective   Shirley Calhoun is a 79 y.o. female.   Chief Complaint   Patient presents with   • Sinus Problem     started 6/26     Sinusitis   This is a new problem. The current episode started in the past 7 days. There has been no fever. Associated symptoms include chills, congestion, coughing, headaches, a hoarse voice, sinus pressure and a sore throat. Pertinent negatives include no shortness of breath. Treatments tried: NSAID. The treatment provided mild relief.        The following portions of the patient's history were reviewed and updated as appropriate: allergies, current medications, past family history, past medical history, past social history, past surgical history and problem list.    Review of Systems   Constitutional: Positive for chills.   HENT: Positive for congestion, hoarse voice, postnasal drip, sinus pressure and sore throat.    Respiratory: Positive for cough. Negative for shortness of breath.    Cardiovascular: Negative for chest pain.   Gastrointestinal: Negative for abdominal pain, nausea and vomiting.   Neurological: Negative for confusion.       Objective   Physical Exam   Constitutional: She appears well-developed.   thin   HENT:   Head: Normocephalic and atraumatic.   Right Ear: Hearing, tympanic membrane, external ear and ear canal normal.   Left Ear: Hearing, tympanic membrane, external ear and ear canal normal.   Nose: Congestion present.   Mouth/Throat: Uvula is midline and mucous membranes are normal. Oropharyngeal exudate present. No tonsillar exudate.   Eyes: Conjunctivae are normal.   Neck: Neck supple.   Cardiovascular: Normal rate, regular rhythm and normal heart sounds.   Pulmonary/Chest: Effort normal and breath sounds normal. No respiratory distress. She has no wheezes.   Productive cough   Lymphadenopathy:     She has no cervical adenopathy.   Neurological: She is alert.   Skin: Skin is warm and dry.   Psychiatric: Her behavior is normal.   Nursing note and vitals  reviewed.        Assessment/Plan   Shirley was seen today for sinus problem.    Diagnoses and all orders for this visit:    Acute URI  -     azithromycin (ZITHROMAX) 250 MG tablet; Take 2 tablets the first day, then 1 tablet daily for 4 days.  -     methylPREDNISolone (MEDROL, LIZ,) 4 MG tablet; Take as directed on package instructions.    Other orders  -     Fluticasone Furoate-Vilanterol (BREO ELLIPTA) 200-25 MCG/INH inhaler; Inhale 1 puff Daily.      Z-Liz and steroid taper to treat URI.  Follow-up if no improvement.  She requested a sample of Symbicort today for COPD, however we did not have.  Breo inhaler provided to her instead.

## 2019-08-14 ENCOUNTER — OFFICE VISIT (OUTPATIENT)
Dept: FAMILY MEDICINE CLINIC | Facility: CLINIC | Age: 79
End: 2019-08-14

## 2019-08-14 VITALS
TEMPERATURE: 98.1 F | SYSTOLIC BLOOD PRESSURE: 120 MMHG | BODY MASS INDEX: 19.34 KG/M2 | WEIGHT: 98.5 LBS | HEART RATE: 76 BPM | HEIGHT: 60 IN | DIASTOLIC BLOOD PRESSURE: 68 MMHG | RESPIRATION RATE: 20 BRPM

## 2019-08-14 DIAGNOSIS — K29.00 OTHER ACUTE GASTRITIS WITHOUT HEMORRHAGE: ICD-10-CM

## 2019-08-14 DIAGNOSIS — R19.5 DARK STOOLS: ICD-10-CM

## 2019-08-14 DIAGNOSIS — J41.8 MIXED SIMPLE AND MUCOPURULENT CHRONIC BRONCHITIS (HCC): Primary | ICD-10-CM

## 2019-08-14 PROCEDURE — 99213 OFFICE O/P EST LOW 20 MIN: CPT | Performed by: NURSE PRACTITIONER

## 2019-08-14 RX ORDER — PANTOPRAZOLE SODIUM 40 MG/1
40 TABLET, DELAYED RELEASE ORAL DAILY
Qty: 90 TABLET | Refills: 1 | Status: SHIPPED | OUTPATIENT
Start: 2019-08-14 | End: 2021-02-10 | Stop reason: ALTCHOICE
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
## 2019-09-11 DIAGNOSIS — G47.00 INSOMNIA: ICD-10-CM

## 2019-09-11 RX ORDER — TRAZODONE HYDROCHLORIDE 150 MG/1
TABLET ORAL
Qty: 135 TABLET | Refills: 0 | Status: CANCELLED | OUTPATIENT
Start: 2019-09-11

## 2019-09-11 RX ORDER — DULOXETIN HYDROCHLORIDE 60 MG/1
60 CAPSULE, DELAYED RELEASE ORAL DAILY
Qty: 90 CAPSULE | Refills: 0 | Status: CANCELLED | OUTPATIENT
Start: 2019-09-11

## 2019-09-12 ENCOUNTER — TELEPHONE (OUTPATIENT)
Dept: FAMILY MEDICINE CLINIC | Facility: CLINIC | Age: 79
End: 2019-09-12

## 2019-09-12 DIAGNOSIS — G47.00 INSOMNIA: ICD-10-CM

## 2019-09-12 RX ORDER — TRAZODONE HYDROCHLORIDE 150 MG/1
225 TABLET ORAL NIGHTLY
Qty: 135 TABLET | Refills: 5 | Status: SHIPPED | OUTPATIENT
Start: 2019-09-12 | End: 2020-11-17

## 2019-09-12 RX ORDER — DULOXETIN HYDROCHLORIDE 60 MG/1
60 CAPSULE, DELAYED RELEASE ORAL DAILY
Qty: 90 CAPSULE | Refills: 1 | Status: SHIPPED | OUTPATIENT
Start: 2019-09-12 | End: 2020-04-28

## 2019-09-24 ENCOUNTER — OFFICE VISIT (OUTPATIENT)
Dept: FAMILY MEDICINE CLINIC | Facility: CLINIC | Age: 79
End: 2019-09-24

## 2019-09-24 VITALS
WEIGHT: 103.5 LBS | HEART RATE: 76 BPM | BODY MASS INDEX: 20.32 KG/M2 | RESPIRATION RATE: 18 BRPM | DIASTOLIC BLOOD PRESSURE: 68 MMHG | SYSTOLIC BLOOD PRESSURE: 122 MMHG | TEMPERATURE: 97.8 F | HEIGHT: 60 IN

## 2019-09-24 DIAGNOSIS — G47.00 INSOMNIA, UNSPECIFIED TYPE: ICD-10-CM

## 2019-09-24 DIAGNOSIS — J01.00 ACUTE NON-RECURRENT MAXILLARY SINUSITIS: Primary | ICD-10-CM

## 2019-09-24 PROCEDURE — 99213 OFFICE O/P EST LOW 20 MIN: CPT | Performed by: NURSE PRACTITIONER

## 2019-09-24 RX ORDER — ZOLPIDEM TARTRATE 5 MG/1
5 TABLET ORAL NIGHTLY PRN
Qty: 5 TABLET | Refills: 0 | Status: SHIPPED | OUTPATIENT
Start: 2019-09-24 | End: 2021-04-01

## 2019-09-24 RX ORDER — AMOXICILLIN AND CLAVULANATE POTASSIUM 875; 125 MG/1; MG/1
1 TABLET, FILM COATED ORAL 2 TIMES DAILY
Qty: 14 TABLET | Refills: 0 | Status: SHIPPED | OUTPATIENT
Start: 2019-09-24 | End: 2019-12-10

## 2019-09-24 NOTE — PROGRESS NOTES
Subjective   Shirley Calhoun is a 79 y.o. female.     Sinus Problem   This is a new problem. The current episode started in the past 7 days. The problem has been gradually worsening since onset. There has been no fever. Her pain is at a severity of 6/10. Associated symptoms include congestion, coughing, headaches, a hoarse voice, shortness of breath and sinus pressure. Pertinent negatives include no chills, diaphoresis, ear pain, sneezing, sore throat or swollen glands. Past treatments include saline sprays. The treatment provided no relief.    Sinusitis    Insomnia  Difficulty falling asleep and staying asleep, this happens periodically when Trazodone no longer helps  She has long hx of insomnia   Occasionally she takes a few nights of Ambien for 5 nights which gets her straightened back out on sleep awake     The following portions of the patient's history were reviewed and updated as appropriate: allergies, current medications, past family history, past medical history, past social history, past surgical history and problem list.    Review of Systems   Constitutional: Negative.  Negative for activity change, chills, diaphoresis and fever.   HENT: Positive for congestion, hoarse voice, postnasal drip and sinus pressure. Negative for ear pain, sneezing, sore throat and swollen glands.    Respiratory: Positive for cough and shortness of breath. Negative for wheezing.    Cardiovascular: Negative.    Gastrointestinal: Negative.    Genitourinary: Negative.    Musculoskeletal: Negative.    Hematological: Negative.    Psychiatric/Behavioral: Positive for sleep disturbance.       Objective   Physical Exam   Constitutional: She is oriented to person, place, and time. She appears well-developed and well-nourished. No distress.   HENT:   Head: Normocephalic.   Right Ear: Tympanic membrane, external ear and ear canal normal.   Left Ear: Tympanic membrane, external ear and ear canal normal.   Nose: Mucosal edema, rhinorrhea and  sinus tenderness present. Right sinus exhibits maxillary sinus tenderness and frontal sinus tenderness. Left sinus exhibits maxillary sinus tenderness and frontal sinus tenderness.   Mouth/Throat: Uvula is midline, oropharynx is clear and moist and mucous membranes are normal. No oropharyngeal exudate, posterior oropharyngeal edema, posterior oropharyngeal erythema or tonsillar abscesses.   Eyes: Conjunctivae are normal.   Neck: Neck supple.   Cardiovascular: Normal rate, regular rhythm and normal heart sounds.   Pulmonary/Chest: Effort normal and breath sounds normal. No stridor. No respiratory distress. She has no wheezes. She has no rales.   Lymphadenopathy:        Head (right side): No tonsillar, no preauricular, no posterior auricular and no occipital adenopathy present.        Head (left side): No tonsillar, no preauricular, no posterior auricular and no occipital adenopathy present.     She has no cervical adenopathy.   Neurological: She is alert and oriented to person, place, and time.   Skin: Skin is warm and dry. Capillary refill takes less than 2 seconds. She is not diaphoretic.   Psychiatric: She has a normal mood and affect. Her behavior is normal. Judgment and thought content normal.   Nursing note and vitals reviewed.        Assessment/Plan   Shirley was seen today for sinus problem and insomnia.    Diagnoses and all orders for this visit:    Acute non-recurrent maxillary sinusitis  -     amoxicillin-clavulanate (AUGMENTIN) 875-125 MG per tablet; Take 1 tablet by mouth 2 (Two) Times a Day.    Insomnia, unspecified type  -     zolpidem (AMBIEN) 5 MG tablet; Take 1 tablet by mouth At Night As Needed for Sleep.

## 2019-10-23 ENCOUNTER — OFFICE VISIT (OUTPATIENT)
Dept: FAMILY MEDICINE CLINIC | Facility: CLINIC | Age: 79
End: 2019-10-23

## 2019-10-23 VITALS
SYSTOLIC BLOOD PRESSURE: 142 MMHG | BODY MASS INDEX: 20.1 KG/M2 | HEIGHT: 60 IN | HEART RATE: 80 BPM | WEIGHT: 102.4 LBS | DIASTOLIC BLOOD PRESSURE: 84 MMHG | RESPIRATION RATE: 16 BRPM | TEMPERATURE: 97.7 F

## 2019-10-23 DIAGNOSIS — R03.0 ELEVATED BP WITHOUT DIAGNOSIS OF HYPERTENSION: ICD-10-CM

## 2019-10-23 DIAGNOSIS — R42 EPISODE OF DIZZINESS: ICD-10-CM

## 2019-10-23 DIAGNOSIS — R29.6 FREQUENT FALLS: Primary | ICD-10-CM

## 2019-10-23 DIAGNOSIS — H53.9 VISION CHANGES: ICD-10-CM

## 2019-10-23 DIAGNOSIS — F10.10 ALCOHOL ABUSE, DAILY USE: ICD-10-CM

## 2019-10-23 DIAGNOSIS — D51.0 PERNICIOUS ANEMIA: ICD-10-CM

## 2019-10-23 LAB
ALBUMIN SERPL-MCNC: 4.8 G/DL (ref 3.5–5.2)
ALBUMIN/GLOB SERPL: 2.3 G/DL
ALP SERPL-CCNC: 59 U/L (ref 39–117)
ALT SERPL-CCNC: 12 U/L (ref 1–33)
AST SERPL-CCNC: 24 U/L (ref 1–32)
BASOPHILS # BLD AUTO: 0.06 10*3/MM3 (ref 0–0.2)
BASOPHILS NFR BLD AUTO: 1.1 % (ref 0–1.5)
BILIRUB SERPL-MCNC: 0.5 MG/DL (ref 0.2–1.2)
BUN SERPL-MCNC: 13 MG/DL (ref 8–23)
BUN/CREAT SERPL: 18.3 (ref 7–25)
CALCIUM SERPL-MCNC: 9.8 MG/DL (ref 8.6–10.5)
CHLORIDE SERPL-SCNC: 99 MMOL/L (ref 98–107)
CO2 SERPL-SCNC: 26.8 MMOL/L (ref 22–29)
CREAT SERPL-MCNC: 0.71 MG/DL (ref 0.57–1)
EOSINOPHIL # BLD AUTO: 0.23 10*3/MM3 (ref 0–0.4)
EOSINOPHIL NFR BLD AUTO: 4.2 % (ref 0.3–6.2)
ERYTHROCYTE [DISTWIDTH] IN BLOOD BY AUTOMATED COUNT: 11.9 % (ref 12.3–15.4)
FOLATE SERPL-MCNC: 10.8 NG/ML (ref 4.78–24.2)
GLOBULIN SER CALC-MCNC: 2.1 GM/DL
GLUCOSE SERPL-MCNC: 89 MG/DL (ref 65–99)
HCT VFR BLD AUTO: 39.3 % (ref 34–46.6)
HGB BLD-MCNC: 13.6 G/DL (ref 12–15.9)
IMM GRANULOCYTES # BLD AUTO: 0.02 10*3/MM3 (ref 0–0.05)
IMM GRANULOCYTES NFR BLD AUTO: 0.4 % (ref 0–0.5)
IRON SATN MFR SERPL: 39 % (ref 20–50)
IRON SERPL-MCNC: 140 MCG/DL (ref 37–145)
LYMPHOCYTES # BLD AUTO: 0.63 10*3/MM3 (ref 0.7–3.1)
LYMPHOCYTES NFR BLD AUTO: 11.6 % (ref 19.6–45.3)
MCH RBC QN AUTO: 35.2 PG (ref 26.6–33)
MCHC RBC AUTO-ENTMCNC: 34.6 G/DL (ref 31.5–35.7)
MCV RBC AUTO: 101.8 FL (ref 79–97)
MONOCYTES # BLD AUTO: 0.73 10*3/MM3 (ref 0.1–0.9)
MONOCYTES NFR BLD AUTO: 13.4 % (ref 5–12)
NEUTROPHILS # BLD AUTO: 3.78 10*3/MM3 (ref 1.7–7)
NEUTROPHILS NFR BLD AUTO: 69.3 % (ref 42.7–76)
NRBC BLD AUTO-RTO: 0 /100 WBC (ref 0–0.2)
PLATELET # BLD AUTO: 324 10*3/MM3 (ref 140–450)
POTASSIUM SERPL-SCNC: 4.9 MMOL/L (ref 3.5–5.2)
PROT SERPL-MCNC: 6.9 G/DL (ref 6–8.5)
RBC # BLD AUTO: 3.86 10*6/MM3 (ref 3.77–5.28)
SODIUM SERPL-SCNC: 141 MMOL/L (ref 136–145)
TIBC SERPL-MCNC: 356 MCG/DL
UIBC SERPL-MCNC: 216 MCG/DL (ref 112–346)
VIT B12 SERPL-MCNC: >2000 PG/ML (ref 211–946)
WBC # BLD AUTO: 5.45 10*3/MM3 (ref 3.4–10.8)

## 2019-10-23 PROCEDURE — 99214 OFFICE O/P EST MOD 30 MIN: CPT | Performed by: NURSE PRACTITIONER
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
## 2019-10-24 ENCOUNTER — TELEPHONE (OUTPATIENT)
Dept: FAMILY MEDICINE CLINIC | Facility: CLINIC | Age: 79
End: 2019-10-24

## 2019-10-24 PROBLEM — R29.6 FREQUENT FALLS: Status: ACTIVE | Noted: 2019-10-24

## 2019-10-24 RX ORDER — MECLIZINE HYDROCHLORIDE 25 MG/1
25 TABLET ORAL 3 TIMES DAILY PRN
Qty: 30 TABLET | Refills: 0 | Status: SHIPPED | OUTPATIENT
Start: 2019-10-24 | End: 2019-11-11 | Stop reason: SDUPTHER

## 2019-10-24 NOTE — TELEPHONE ENCOUNTER
ONCE DENISSE SIGNS OFF ON PATIENTS LABS FROM YESTERDAY Merged with [facility] IS NEEDING HER CREATININE FAXED OVER TO THEM.  THE FAX NUMBER [phone]

## 2019-10-29 ENCOUNTER — OFFICE VISIT (OUTPATIENT)
Dept: FAMILY MEDICINE CLINIC | Facility: CLINIC | Age: 79
End: 2019-10-29

## 2019-10-29 VITALS
SYSTOLIC BLOOD PRESSURE: 122 MMHG | DIASTOLIC BLOOD PRESSURE: 84 MMHG | TEMPERATURE: 97.2 F | HEART RATE: 80 BPM | WEIGHT: 102.2 LBS | BODY MASS INDEX: 20.07 KG/M2 | RESPIRATION RATE: 20 BRPM | HEIGHT: 60 IN

## 2019-10-29 DIAGNOSIS — R09.81 CONGESTION OF NASAL SINUS: Primary | ICD-10-CM

## 2019-10-29 PROCEDURE — 99213 OFFICE O/P EST LOW 20 MIN: CPT | Performed by: NURSE PRACTITIONER

## 2019-10-29 PROCEDURE — 96372 THER/PROPH/DIAG INJ SC/IM: CPT | Performed by: NURSE PRACTITIONER

## 2019-10-29 RX ORDER — TRIAMCINOLONE ACETONIDE 40 MG/ML
40 INJECTION, SUSPENSION INTRA-ARTICULAR; INTRAMUSCULAR ONCE
Status: COMPLETED | OUTPATIENT
Start: 2019-10-29 | End: 2019-10-29

## 2019-10-29 RX ADMIN — TRIAMCINOLONE ACETONIDE 40 MG: 40 INJECTION, SUSPENSION INTRA-ARTICULAR; INTRAMUSCULAR at 12:45

## 2019-10-29 NOTE — PROGRESS NOTES
Subjective   Shirley Calhoun is a 79 y.o. female.     History of Present Illness   Recheck BP  Last week was feeling poorly, dizzy, falling and BP was elevated  She was worried that she might have a problem in her brain. She has been taking the Meclizine which is helping  She is feeling much better  MRI was done last week  Sinus congestion and ear fullness, would like a steroid injection   Used to get an injection at the change of seasons that helped her symptoms    The following portions of the patient's history were reviewed and updated as appropriate: allergies, current medications, past family history, past medical history, past social history, past surgical history and problem list.    Review of Systems   Constitutional: Negative for activity change, unexpected weight gain and unexpected weight loss.   Eyes: Negative for blurred vision.   Neurological: Positive for light-headedness. Negative for tremors, syncope, speech difficulty, weakness, headache and confusion.   Psychiatric/Behavioral: Positive for sleep disturbance. The patient is nervous/anxious.        Objective   Physical Exam   Constitutional: She is oriented to person, place, and time. She appears well-developed and well-nourished. No distress.   Eyes: Lids are normal.   Cardiovascular: Normal rate, regular rhythm, S1 normal, S2 normal and normal heart sounds.   Pulmonary/Chest: Effort normal and breath sounds normal.   Neurological: She is alert and oriented to person, place, and time.   Skin: She is not diaphoretic.   Psychiatric: She has a normal mood and affect. Her behavior is normal. Judgment and thought content normal.   Nursing note and vitals reviewed.        Assessment/Plan   Shirley was seen today for discuss mri and dizziness.    Diagnoses and all orders for this visit:    Congestion of nasal sinus  -     triamcinolone acetonide (KENALOG-40) injection 40 mg      Reviewed MRI results with pt  Will give Kenalog injection 40 mg

## 2019-11-11 RX ORDER — MECLIZINE HYDROCHLORIDE 25 MG/1
TABLET ORAL
Qty: 30 TABLET | Refills: 0 | Status: SHIPPED | OUTPATIENT
Start: 2019-11-11 | End: 2021-04-01

## 2019-11-11 RX ORDER — MECLIZINE HYDROCHLORIDE 25 MG/1
TABLET ORAL
Qty: 30 TABLET | Refills: 0 | OUTPATIENT
Start: 2019-11-11

## 2019-12-10 ENCOUNTER — OFFICE VISIT (OUTPATIENT)
Dept: FAMILY MEDICINE CLINIC | Facility: CLINIC | Age: 79
End: 2019-12-10

## 2019-12-10 ENCOUNTER — HOSPITAL ENCOUNTER (OUTPATIENT)
Dept: GENERAL RADIOLOGY | Facility: HOSPITAL | Age: 79
Discharge: HOME | End: 2019-12-10
Admitting: NURSE PRACTITIONER

## 2019-12-10 VITALS
HEIGHT: 60 IN | DIASTOLIC BLOOD PRESSURE: 84 MMHG | TEMPERATURE: 97.2 F | BODY MASS INDEX: 19.67 KG/M2 | HEART RATE: 88 BPM | WEIGHT: 100.2 LBS | SYSTOLIC BLOOD PRESSURE: 132 MMHG | RESPIRATION RATE: 18 BRPM | OXYGEN SATURATION: 97 %

## 2019-12-10 DIAGNOSIS — J44.1 COPD EXACERBATION (HCC): Primary | ICD-10-CM

## 2019-12-10 DIAGNOSIS — J84.10 GRANULOMATOUS LUNG DISEASE (HCC): ICD-10-CM

## 2019-12-10 PROCEDURE — 99214 OFFICE O/P EST MOD 30 MIN: CPT | Performed by: NURSE PRACTITIONER

## 2019-12-10 PROCEDURE — 71046 X-RAY EXAM CHEST 2 VIEWS: CPT

## 2019-12-10 RX ORDER — LEVOFLOXACIN 500 MG/1
500 TABLET, FILM COATED ORAL DAILY
Qty: 10 TABLET | Refills: 0 | Status: SHIPPED | OUTPATIENT
Start: 2019-12-10 | End: 2019-12-19

## 2019-12-10 RX ORDER — IPRATROPIUM BROMIDE AND ALBUTEROL SULFATE 2.5; .5 MG/3ML; MG/3ML
3 SOLUTION RESPIRATORY (INHALATION)
Status: DISCONTINUED | OUTPATIENT
Start: 2019-12-10 | End: 2021-02-23

## 2019-12-10 NOTE — PROGRESS NOTES
Subjective   Shirley Calhoun is a 79 y.o. female.     History of Present Illness   Cough and congestion, feeling hot and achy for the past 4 to 5 days  Green/yellow mucus yesterday, out of her inhalers, today dry hacking cough. No OTC meds  No known exposure to illness; worried she might have flu last week  Wheezing and SOA. COPD, alcohol use, hx of breast cancer  Still smoking 1/2-1 ppd and not ready to quit  No ST, runny nose or trouble breathing, just does not feel she can catch her breath,   Has not had a CXR since 2017 which showed granulomatous lung tissue  Has not ever seen Pulmonary; due for PFTs  Has a long hx of being around [place] with [place]  Admits that she fell again, denies taking Meclizine or using Trazodone or alcohol prior to falling, she was trying to put her dog on the leash when she fell on her left side. No bruising or swelling. She does not think she broke a rib or anything      The following portions of the patient's history were reviewed and updated as appropriate: allergies, current medications, past family history, past medical history, past social history, past surgical history and problem list.    Review of Systems   Constitutional: Positive for chills and fever. Negative for activity change, appetite change and fatigue.   HENT: Positive for congestion, postnasal drip, rhinorrhea, sinus pressure and sore throat. Negative for ear pain, sneezing, swollen glands, trouble swallowing and voice change.    Eyes: Negative for redness and itching.   Respiratory: Positive for cough. Negative for chest tightness, shortness of breath and wheezing.    Cardiovascular: Negative.  Negative for chest pain.   Gastrointestinal: Negative.  Negative for abdominal pain, nausea and vomiting.   Endocrine: Negative.    Genitourinary: Negative.    Musculoskeletal: Positive for myalgias. Negative for arthralgias, neck pain and neck stiffness.   Skin: Negative.  Negative for rash.   Allergic/Immunologic: Negative.   Negative for environmental allergies.   Neurological: Negative for dizziness, weakness, light-headedness and headache.   Hematological: Negative for adenopathy.   Psychiatric/Behavioral: Negative.  Negative for sleep disturbance.       Objective   Physical Exam   Constitutional: She is oriented to person, place, and time. She appears well-developed and well-nourished. No distress.   HENT:   Head: Normocephalic and atraumatic.   Right Ear: External ear normal.   Left Ear: External ear normal.   Nose: Nose normal.   Mouth/Throat: Oropharynx is clear and moist. No oropharyngeal exudate.   Cardiovascular: Normal rate, regular rhythm and normal heart sounds.   Pulmonary/Chest: Effort normal and breath sounds normal.   Neurological: She is alert and oriented to person, place, and time.   Skin: Skin is warm and dry. Capillary refill takes less than 2 seconds. She is not diaphoretic.   Psychiatric: She has a normal mood and affect. Her behavior is normal. Judgment and thought content normal.   Nursing note and vitals reviewed.    Duo neb completed in the office, pre neb O2 sat 96%, HR 88 poor air movement through out lung fields  After neb O2 sat 95%, HR 98, pt air movement improved, no wheezing, productive cough with green thick sputum      Assessment/Plan   Shirley was seen today for nasal congestion.    Diagnoses and all orders for this visit:    COPD exacerbation (CMS/[facility])  -     Ambulatory Referral to Pulmonology  -     XR Chest PA & Lateral  -     ipratropium-albuterol (DUO-NEB) nebulizer solution 3 mL  -     levoFLOXacin (LEVAQUIN) 500 MG tablet; Take 1 tablet by mouth Daily.    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

## 2019-12-19 ENCOUNTER — OFFICE VISIT (OUTPATIENT)
Dept: PULMONOLOGY | Facility: CLINIC | Age: 79
End: 2019-12-19

## 2019-12-19 VITALS
BODY MASS INDEX: 19.41 KG/M2 | DIASTOLIC BLOOD PRESSURE: 64 MMHG | WEIGHT: 102.8 LBS | HEART RATE: 90 BPM | SYSTOLIC BLOOD PRESSURE: 120 MMHG | OXYGEN SATURATION: 99 % | TEMPERATURE: 97.8 F | HEIGHT: 61 IN

## 2019-12-19 DIAGNOSIS — J43.1 PANLOBULAR EMPHYSEMA (HCC): Primary | ICD-10-CM

## 2019-12-19 DIAGNOSIS — Z72.0 TOBACCO ABUSE: ICD-10-CM

## 2019-12-19 DIAGNOSIS — J43.1 PANLOBULAR EMPHYSEMA (HCC): ICD-10-CM

## 2019-12-19 PROCEDURE — 94726 PLETHYSMOGRAPHY LUNG VOLUMES: CPT | Performed by: INTERNAL MEDICINE

## 2019-12-19 PROCEDURE — 94375 RESPIRATORY FLOW VOLUME LOOP: CPT | Performed by: INTERNAL MEDICINE

## 2019-12-19 PROCEDURE — 99204 OFFICE O/P NEW MOD 45 MIN: CPT | Performed by: INTERNAL MEDICINE

## 2019-12-19 PROCEDURE — 94729 DIFFUSING CAPACITY: CPT | Performed by: INTERNAL MEDICINE

## 2019-12-19 RX ORDER — POLYETHYLENE GLYCOL 3350 17 G
4 POWDER IN PACKET (EA) ORAL AS NEEDED
Qty: 168 EACH | Refills: 0 | Status: SHIPPED | OUTPATIENT
Start: 2019-12-19 | End: 2020-09-23

## 2019-12-19 RX ORDER — NICOTINE 21 MG/24HR
1 PATCH, TRANSDERMAL 24 HOURS TRANSDERMAL EVERY 24 HOURS
Qty: 28 EACH | Refills: 0 | Status: SHIPPED | OUTPATIENT
Start: 2019-12-19 | End: 2020-09-23

## 2019-12-19 NOTE — PROGRESS NOTES
New Patient Pulmonary Office Visit      Patient Name: Shirley Calhoun    Referring Physician: Lara Kline APRN    Chief Complaint:    Chief Complaint   Patient presents with   • COPD       History of Present Illness: Shirley Calhoun is a 79 y.o. female who is here today to establish care with Pulmonary.  Patient has a past medical history for COPD, tobacco abuse with a 90 pack/year history, traumatic subdural hematoma, small bowel obstruction, and total hip replacement.  Who was referred to pulmonary for evaluation of her COPD.    COPD: She presents for evaluation and treatment of COPD Gold stage 3 class A. Symptoms include dyspnea with exertion. Symptoms began 5 years ago. Symptoms are exacerbated by strenuous exercise.  Symptoms are alleviated by rest. Patient's last exacerbation was 2 weeks ago treated with prednisone and Levaquin. The patient reports adherence to medication regimen yes.  She currently has Breo that was given a sample, and she has albuterol to use as needed.  She states that she is using the albuterol only intermittently 3-4 times per week.  She notes that she has smoked since she was 16 years old and would like to quit smoking, she had previously quit for 5 years but gained significant amount of weight and started smoking again.  States that her trigger is more likely secondary to just sitting in the house getting bored, as she has been able to quit at other time periods when she has been in the hospital for previous surgeries as well as when she was on a plane flight to [place].  She currently denies any chest pain, shortness of breath, fever, or chills.    Review of Systems:   Review of Systems   Constitutional: Negative for chills, fatigue and fever.   HENT: Negative for congestion and voice change.    Eyes: Negative for blurred vision.   Respiratory: Negative for cough, shortness of breath and wheezing.    Cardiovascular: Negative for chest pain.   Skin: Negative for dry skin.    Hematological: Negative for adenopathy.   Psychiatric/Behavioral: Negative for agitation and depressed mood.       Past Medical History:   Past Medical History:   Diagnosis Date   • Anxiety    • Arthritis    • Bowel obstruction (CMS/HCC)    • COPD (chronic obstructive pulmonary disease) (CMS/HCC)    • Depression    • Fatigue    • GERD (gastroesophageal reflux disease)    • Hip pain    • Hx of colonic polyps    • Malignant neoplasm of breast, left    • Wears dentures    • Wears eyeglasses        Past Surgical History:   Past Surgical History:   Procedure Laterality Date   • BACK SURGERY     • BRAIN SURGERY      [facility]   • CATARACT EXTRACTION Bilateral 2013   • COLONOSCOPY      1 year ago   • DILATATION AND CURETTAGE     • KNEE SURGERY Right    • MASTECTOMY Bilateral    • TOTAL HIP ARTHROPLASTY Right 5/1/2017    Procedure: RIGHT TOTAL HIP ARTHROPLASTY ANTERIOR;  Surgeon: Nii Nayak MD;  Location: [facility];  Service:        Family History:   Family History   Problem Relation Age of Onset   • No Known Problems Mother    • Alcohol abuse Father    • No Known Problems Sister    • No Known Problems Brother    • No Known Problems Daughter    • No Known Problems Son        Social History:   Social History     Socioeconomic History   • Marital status:      Spouse name: Not on file   • Number of children: Not on file   • Years of education: Not on file   • Highest education level: Not on file   Tobacco Use   • Smoking status: Current Every Day Smoker     Packs/day: 1.00     Years: 50.00     Pack years: 50.00     Types: Cigarettes   • Smokeless tobacco: Never Used   Substance and Sexual Activity   • Alcohol use: Yes     Alcohol/week: 28.0 standard drinks     Types: 28 Shots of liquor per week     Frequency: 4 or more times a week     Drinks per session: 3 or 4     Comment: hx of alcohol abuse drinking vodka daily   • Drug use: No   • Sexual activity: Defer       Medications:     Current Outpatient Medications:   •   ALBUTEROL SULFATE  (90 Base) MCG/ACT inhaler, INHALE 2 PUFFS EVERY 4 HOURS AS NEEDED FOR WHEEZING, Disp: 18 g, Rfl: 0  •  aspirin 81 MG tablet, Take 1 tablet by mouth Daily. Resume in 1 month, Disp: , Rfl:   •  Calcium 600-400 MG-UNIT chewable tablet, Chew 1 tablet 2 (Two) Times a Day As Needed., Disp: , Rfl:   •  Cyanocobalamin (VITAMIN B 12 PO), Take  by mouth., Disp: , Rfl:   •  docusate sodium 100 MG capsule, Take 100 mg by mouth 2 (Two) Times a Day., Disp: 60 capsule, Rfl: 0  •  DULoxetine (CYMBALTA) 60 MG capsule, Take 1 capsule by mouth Daily., Disp: 90 capsule, Rfl: 1  •  loratadine (CLARITIN) 10 MG tablet, Take 10 mg by mouth Daily., Disp: , Rfl:   •  meclizine (ANTIVERT) 25 MG tablet, TAKE 1 TABLET BY MOUTH THREE TIMES DAILY AS NEEDED FOR DIZZINESS, Disp: 30 tablet, Rfl: 0  •  MILK THISTLE PO, Take  by mouth., Disp: , Rfl:   •  Multiple Vitamins-Minerals (AIRBORNE PO), Take  by mouth., Disp: , Rfl:   •  oxybutynin XL (DITROPAN-XL) 5 MG 24 hr tablet, TAKE 1 TABLET BY MOUTH DAILY, Disp: 90 tablet, Rfl: 1  •  pantoprazole (PROTONIX) 40 MG EC tablet, Take 1 tablet by mouth Daily., Disp: 90 tablet, Rfl: 1  •  polyethylene glycol (MIRALAX) packet, Take 17 g by mouth Daily., Disp: , Rfl:   •  Potassium Gluconate 550 MG tablet, Take 550 mg by mouth Daily., Disp: , Rfl:   •  thiamine (VITAMIN B-1) 100 MG tablet, Take 100 mg by mouth Daily., Disp: , Rfl:   •  traZODone (DESYREL) 150 MG tablet, Take 1.5 tablets by mouth Every Night., Disp: 135 tablet, Rfl: 5  •  triamcinolone (KENALOG) 0.1 % cream, Apply  topically 2 (Two) Times a Day., Disp: 45 g, Rfl: 0  •  nicotine (NICODERM CQ) 21 MG/24HR patch, Place 1 patch on the skin as directed by provider Daily., Disp: 28 each, Rfl: 0  •  nicotine polacrilex (NICOTINE MINI) 4 MG lozenge, Dissolve 1 lozenge in the mouth As Needed for Smoking Cessation., Disp: 168 each, Rfl: 0  •  QUEtiapine (SEROQUEL) 50 MG tablet, Take 1 tablet by mouth Every Night., Disp: 30 tablet,  "Rfl: 2  •  tiotropium bromide monohydrate (SPIRIVA RESPIMAT) 2.5 MCG/ACT aerosol solution inhaler, Inhale 2 puffs Daily., Disp: 2 inhaler, Rfl: 0  •  zolpidem (AMBIEN) 5 MG tablet, Take 1 tablet by mouth At Night As Needed for Sleep., Disp: 5 tablet, Rfl: 0    Current Facility-Administered Medications:   •  ipratropium-albuterol (DUO-NEB) nebulizer solution 3 mL, 3 mL, Nebulization, 4x Daily - RT, Lara Kline, LORNA    Allergies:   No Known Allergies    Physical Exam:  Vital Signs:   Vitals:    12/19/19 1240   BP: 120/64   Pulse: 90   Temp: 97.8 °F (36.6 °C)   SpO2: 99%  Comment: resting, room air   Weight: 46.6 kg (102 lb 12.8 oz)   Height: 153.7 cm (60.5\")       Physical Exam   Constitutional: She is oriented to person, place, and time. She appears well-developed and well-nourished.   HENT:   Head: Normocephalic and atraumatic.   Right Ear: External ear normal.   Left Ear: External ear normal.   Mouth/Throat: Oropharynx is clear and moist.   Eyes: Pupils are equal, round, and reactive to light. Conjunctivae are normal.   Neck: Normal range of motion. Neck supple.   Cardiovascular: Normal rate and regular rhythm.   Pulmonary/Chest: Effort normal and breath sounds normal.   Abdominal: Soft. Bowel sounds are normal.   Musculoskeletal: Normal range of motion.   Neurological: She is alert and oriented to person, place, and time.   Psychiatric: She has a normal mood and affect. Her behavior is normal.   Nursing note and vitals reviewed.      Results Review:   - I personally reviewed the pts imaging from chest x-ray 12/19/2019 showed diffuse bilateral emphysematous changes, with no acute cardiopulmonary process  - I personally reviewed the pts PFT from 12/19/2019 showed moderate obstruction without restriction, DLCO could not be calculated secondary to patient effort.  With an FEV1 of 78%.    Assessment / Plan:   Panlobular emphysema (CMS/HCC)  - Patient has moderate COPD at this point secondary to emphysema.  I explained " to her that overall she is doing very well but I recommended that she quit smoking.  From a inhaler standpoint I feel that she would be most benefited from a anticholinergic, I informed her that I am unsure which one her insurance will cover we will start with Spiriva Respimat as I had samples in the office today.  But she plans to call her insurance company and if there is a different anticholinergic drug that they are willing to cover I would be happy to prescribe that drug to her.  She would also be a candidate for a LABA/LAMA as well if insurance would cover it.  I do not feel that she needs the inhaled corticosteroid component at this time.  -     Pulmonary Function Test  -     tiotropium bromide monohydrate (SPIRIVA RESPIMAT) 2.5 MCG/ACT aerosol solution inhaler; Inhale 2 puffs Daily.    Tobacco abuse  - In this visit the patient was advised to stop smoking and was offered tobacco cessation measures and resources, including NRT and/or medication intervention. Greater than 10 minutes was spent on face-to-face counseling regarding smoking cessation.   - Went ahead and prescribe her nicotine patches and lozenges today, to use in combination I explained to her though that she will need to focus on her triggers in order to help her to quit smoking.  Also informed her insurance may not cover these.  She verbalized understanding of this.  -     nicotine (NICODERM CQ) 21 MG/24HR patch; Place 1 patch on the skin as directed by provider Daily.  -     nicotine polacrilex (NICOTINE MINI) 4 MG lozenge; Dissolve 1 lozenge in the mouth As Needed for Smoking Cessation.    Follow Up:   Return in about 6 months (around 6/19/2020) for Please call sooner if she has having shortness of breath..     LUZ Bauer, DO  Pulmonary and Critical Care Medicine  Note Electronically Signed    Please note that portions of this note may have been completed with a voice recognition program. Efforts were made to edit the dictations, but  occasionally words are mistranscribed.

## 2020-02-27 NOTE — TELEPHONE ENCOUNTER
Christian Bermudez--    Pt states the pharmacy sent prior auth requests over for     traZODone (DESYREL) 150 MG tablet    DULoxetine (CYMBALTA) 60 MG capsule       [facility] in [place]    Pt contact [phone]  
It looks like it's refill request they want.  
Refills sent as requested. ajc  
symmetrical

## 2020-04-28 RX ORDER — DULOXETIN HYDROCHLORIDE 60 MG/1
CAPSULE, DELAYED RELEASE ORAL
Qty: 90 CAPSULE | Refills: 1 | Status: SHIPPED | OUTPATIENT
Start: 2020-04-28 | End: 2021-01-29

## 2020-05-29 DIAGNOSIS — J43.1 PANLOBULAR EMPHYSEMA (HCC): ICD-10-CM

## 2020-05-29 RX ORDER — ALBUTEROL SULFATE 90 UG/1
AEROSOL, METERED RESPIRATORY (INHALATION)
Qty: 18 G | Refills: 0 | Status: SHIPPED | OUTPATIENT
Start: 2020-05-29 | End: 2020-08-05

## 2020-08-04 DIAGNOSIS — J43.1 PANLOBULAR EMPHYSEMA (HCC): ICD-10-CM

## 2020-08-04 NOTE — TELEPHONE ENCOUNTER
Reordered Rx Spiriva Respimat 2.5 per chart via fax to [facility] per pt's request. Pt notified and verbalized understanding and appreciation.

## 2020-08-05 DIAGNOSIS — J43.1 PANLOBULAR EMPHYSEMA (HCC): ICD-10-CM

## 2020-08-05 RX ORDER — TIOTROPIUM BROMIDE INHALATION SPRAY 3.12 UG/1
SPRAY, METERED RESPIRATORY (INHALATION)
Qty: 4 G | Refills: 3 | Status: SHIPPED | OUTPATIENT
Start: 2020-08-05 | End: 2021-02-23

## 2020-08-05 RX ORDER — ALBUTEROL SULFATE 90 UG/1
AEROSOL, METERED RESPIRATORY (INHALATION)
Qty: 18 G | Refills: 0 | Status: SHIPPED | OUTPATIENT
Start: 2020-08-05 | End: 2020-10-23

## 2020-09-23 ENCOUNTER — OFFICE VISIT (OUTPATIENT)
Dept: FAMILY MEDICINE CLINIC | Facility: CLINIC | Age: 80
End: 2020-09-23

## 2020-09-23 VITALS
DIASTOLIC BLOOD PRESSURE: 60 MMHG | TEMPERATURE: 98.2 F | SYSTOLIC BLOOD PRESSURE: 130 MMHG | WEIGHT: 97.4 LBS | HEART RATE: 88 BPM | RESPIRATION RATE: 24 BRPM | BODY MASS INDEX: 18.39 KG/M2 | HEIGHT: 61 IN

## 2020-09-23 DIAGNOSIS — C50.912 MALIGNANT NEOPLASM OF LEFT FEMALE BREAST, UNSPECIFIED ESTROGEN RECEPTOR STATUS, UNSPECIFIED SITE OF BREAST (HCC): ICD-10-CM

## 2020-09-23 DIAGNOSIS — M81.0 POST-MENOPAUSAL OSTEOPOROSIS: ICD-10-CM

## 2020-09-23 DIAGNOSIS — M81.0 AGE-RELATED OSTEOPOROSIS WITHOUT CURRENT PATHOLOGICAL FRACTURE: ICD-10-CM

## 2020-09-23 DIAGNOSIS — F10.10 ALCOHOL ABUSE, DAILY USE: ICD-10-CM

## 2020-09-23 DIAGNOSIS — R79.1 ABNORMAL COAGULATION PROFILE: ICD-10-CM

## 2020-09-23 DIAGNOSIS — F32.4 MAJOR DEPRESSIVE DISORDER WITH SINGLE EPISODE, IN PARTIAL REMISSION (HCC): ICD-10-CM

## 2020-09-23 DIAGNOSIS — D51.0 PERNICIOUS ANEMIA: ICD-10-CM

## 2020-09-23 DIAGNOSIS — R23.3 ABNORMAL BRUISING: Primary | ICD-10-CM

## 2020-09-23 DIAGNOSIS — D51.3 OTHER DIETARY VITAMIN B12 DEFICIENCY ANEMIA: ICD-10-CM

## 2020-09-23 DIAGNOSIS — J43.1 PANLOBULAR EMPHYSEMA (HCC): ICD-10-CM

## 2020-09-23 PROCEDURE — 99214 OFFICE O/P EST MOD 30 MIN: CPT | Performed by: NURSE PRACTITIONER

## 2020-09-23 PROCEDURE — G0008 ADMIN INFLUENZA VIRUS VAC: HCPCS | Performed by: NURSE PRACTITIONER

## 2020-09-23 PROCEDURE — 90694 VACC AIIV4 NO PRSRV 0.5ML IM: CPT | Performed by: NURSE PRACTITIONER

## 2020-09-23 NOTE — PROGRESS NOTES
Subjective   Shirley Calhoun is a 80 y.o. female.     History of Present Illness   Bruising easily on legs  Worried she might have something wrong with her blood  She denies bright red blood from nose, or gums. She has had some petechiae too on her upper thighs  No falls or accident or injury that she knows of  Hx of cancers (bilateral breast, basal cell), pernicious anemia, daily alcohol use, tobacco abuse  Denies dizziness, weakness, SOA or blood from rectum  Due for labs    Osteoporosis  Due for DEXA scan  Not taking Vit D or calcium supplement  No hx of bone fracture      Wants to get a flu vaccine  Depression stable, but she had to call a friend to get away from her current surroundings since she lives alone she was feeling very isolated due to COVID    The following portions of the patient's history were reviewed and updated as appropriate: allergies, current medications, past family history, past medical history, past social history, past surgical history and problem list.    Review of Systems   Constitutional: Positive for unexpected weight loss. Negative for activity change and unexpected weight gain.   HENT: Negative.  Negative for nosebleeds and swollen glands.    Respiratory: Negative for cough, chest tightness, shortness of breath and wheezing.    Cardiovascular: Negative.  Negative for chest pain, palpitations and leg swelling.   Gastrointestinal: Negative for anal bleeding and blood in stool.   Genitourinary: Negative for urinary incontinence.   Musculoskeletal: Negative.    Skin: Positive for bruise.   Neurological: Negative for dizziness, weakness and light-headedness.   Psychiatric/Behavioral: Positive for depressed mood. Negative for self-injury, sleep disturbance, suicidal ideas and stress. The patient is nervous/anxious.        Objective   Physical Exam  Vitals signs and nursing note reviewed.   Constitutional:       General: She is not in acute distress.     Appearance: Normal appearance. She is  well-developed and normal weight. She is not ill-appearing or toxic-appearing.   HENT:      Head: Normocephalic and atraumatic.      Right Ear: External ear normal.      Left Ear: External ear normal.      Nose: Nose normal.   Eyes:      General: Lids are normal.   Neck:      Musculoskeletal: Neck supple.   Cardiovascular:      Rate and Rhythm: Normal rate and regular rhythm.      Heart sounds: Normal heart sounds, S1 normal and S2 normal. No murmur. No friction rub. No gallop.    Pulmonary:      Effort: Pulmonary effort is normal. No respiratory distress.      Breath sounds: Normal breath sounds.   Abdominal:      General: Abdomen is flat.      Palpations: Abdomen is soft.      Tenderness: There is no abdominal tenderness.   Musculoskeletal:         General: No swelling or tenderness.      Right lower leg: No edema.      Left lower leg: No edema.   Lymphadenopathy:      Cervical: No cervical adenopathy.   Skin:     General: Skin is warm and dry.      Capillary Refill: Capillary refill takes less than 2 seconds.      Findings: Bruising (small area of bruising on shins of left lower leg and right lower leg ) present. No erythema.   Neurological:      Mental Status: She is alert and oriented to person, place, and time.      Gait: Gait normal.   Psychiatric:         Mood and Affect: Mood normal.         Speech: Speech normal.         Behavior: Behavior normal.         Thought Content: Thought content normal.         Judgment: Judgment normal.           Assessment/Plan   Shirley was seen today for bruising easily.    Diagnoses and all orders for this visit:    Abnormal bruising  -     CBC & Differential  -     Vitamin B12  -     Protime-INR  -     APTT  -     Comprehensive Metabolic Panel  -     Vitamin C  -     Folate    Alcohol abuse, daily use  -     Comprehensive Metabolic Panel  -     Folate    Pernicious anemia  -     CBC & Differential  -     Vitamin B12    Other dietary vitamin B12 deficiency anemia   -      Protime-INR    Abnormal coagulation profile  -     Protime-INR  -     APTT    Post-menopausal osteoporosis  -     DEXA Bone Density Axial; Future    Age-related osteoporosis without current pathological fracture  -     DEXA Bone Density Axial; Future    Panlobular emphysema (CMS/HCC)    Major depressive disorder with single episode, in partial remission (CMS/HCC)    Malignant neoplasm of left female breast, unspecified estrogen receptor status, unspecified site of breast (CMS/HCC)    Other orders  -     Fluad Quad 65+ yrs ([id number])    will update flu vaccine  Will check labs to evaluate for bruising or Vit deficiencies  Will order DEXA scan  Will notify pt of results

## 2020-09-28 LAB
ALBUMIN SERPL-MCNC: 4.3 G/DL (ref 3.7–4.7)
ALBUMIN/GLOB SERPL: 1.7 {RATIO} (ref 1.2–2.2)
ALP SERPL-CCNC: 72 IU/L (ref 39–117)
ALT SERPL-CCNC: 11 IU/L (ref 0–32)
APTT PPP: NORMAL SEC
AST SERPL-CCNC: 21 IU/L (ref 0–40)
BASOPHILS # BLD AUTO: 0.1 X10E3/UL (ref 0–0.2)
BASOPHILS NFR BLD AUTO: 1 %
BILIRUB SERPL-MCNC: 0.5 MG/DL (ref 0–1.2)
BUN SERPL-MCNC: 18 MG/DL (ref 8–27)
BUN/CREAT SERPL: 25 (ref 12–28)
CALCIUM SERPL-MCNC: 9.7 MG/DL (ref 8.7–10.3)
CHLORIDE SERPL-SCNC: 102 MMOL/L (ref 96–106)
CO2 SERPL-SCNC: 25 MMOL/L (ref 20–29)
CREAT SERPL-MCNC: 0.71 MG/DL (ref 0.57–1)
EOSINOPHIL # BLD AUTO: 0.2 X10E3/UL (ref 0–0.4)
EOSINOPHIL NFR BLD AUTO: 4 %
ERYTHROCYTE [DISTWIDTH] IN BLOOD BY AUTOMATED COUNT: 11.7 % (ref 11.7–15.4)
FOLATE SERPL-MCNC: 3.6 NG/ML
GLOBULIN SER CALC-MCNC: 2.6 G/DL (ref 1.5–4.5)
GLUCOSE SERPL-MCNC: 90 MG/DL (ref 65–99)
HCT VFR BLD AUTO: 40.5 % (ref 34–46.6)
HGB BLD-MCNC: 13.9 G/DL (ref 11.1–15.9)
IMM GRANULOCYTES # BLD AUTO: 0 X10E3/UL (ref 0–0.1)
IMM GRANULOCYTES NFR BLD AUTO: 1 %
INR PPP: NORMAL
LYMPHOCYTES # BLD AUTO: 0.6 X10E3/UL (ref 0.7–3.1)
LYMPHOCYTES NFR BLD AUTO: 15 %
MCH RBC QN AUTO: 34.4 PG (ref 26.6–33)
MCHC RBC AUTO-ENTMCNC: 34.3 G/DL (ref 31.5–35.7)
MCV RBC AUTO: 100 FL (ref 79–97)
MONOCYTES # BLD AUTO: 0.6 X10E3/UL (ref 0.1–0.9)
MONOCYTES NFR BLD AUTO: 14 %
NEUTROPHILS # BLD AUTO: 2.9 X10E3/UL (ref 1.4–7)
NEUTROPHILS NFR BLD AUTO: 65 %
PLATELET # BLD AUTO: 274 X10E3/UL (ref 150–450)
POTASSIUM SERPL-SCNC: 4.5 MMOL/L (ref 3.5–5.2)
PROT SERPL-MCNC: 6.9 G/DL (ref 6–8.5)
PROTHROMBIN TIME: NORMAL S
RBC # BLD AUTO: 4.04 X10E6/UL (ref 3.77–5.28)
SODIUM SERPL-SCNC: 140 MMOL/L (ref 134–144)
SPECIMEN STATUS: NORMAL
VIT B12 SERPL-MCNC: >2000 PG/ML (ref 232–1245)
VIT C SERPL-MCNC: 0.4 MG/DL (ref 0.4–2)
WBC # BLD AUTO: 4.3 X10E3/UL (ref 3.4–10.8)

## 2020-10-12 ENCOUNTER — TELEPHONE (OUTPATIENT)
Dept: FAMILY MEDICINE CLINIC | Facility: CLINIC | Age: 80
End: 2020-10-12

## 2020-10-12 RX ORDER — SULFAMETHOXAZOLE AND TRIMETHOPRIM 800; 160 MG/1; MG/1
1 TABLET ORAL 2 TIMES DAILY
Qty: 14 TABLET | Refills: 0 | Status: SHIPPED | OUTPATIENT
Start: 2020-10-12 | End: 2020-12-16

## 2020-10-12 NOTE — TELEPHONE ENCOUNTER
Caller: Shirley Calhoun    Relationship: Self    Best call back number: [phone]     What medication are you requesting: ANTIBIOTIC     What are your current symptoms: BURNING,HAVING TROUBLE URINATING, FEELS LIKE A RAZOR     How long have you been experiencing symptoms: FIVE DAYS     Have you had these symptoms before:    [x] Yes  [] No    Have you been treated for these symptoms before:   [x] Yes  [] No    If a prescription is needed, what is your preferred pharmacy and phone number:  [facility]     Additional notes:  PATIENT CALLED IN REQUESTING AN ANTIBIOTIC FOR A BLADDER INFECTION. PLEASE ADVISE

## 2020-10-22 DIAGNOSIS — J43.1 PANLOBULAR EMPHYSEMA (HCC): ICD-10-CM

## 2020-10-23 RX ORDER — ALBUTEROL SULFATE 90 UG/1
AEROSOL, METERED RESPIRATORY (INHALATION)
Qty: 18 G | Refills: 0 | Status: SHIPPED | OUTPATIENT
Start: 2020-10-23 | End: 2021-02-10 | Stop reason: SDUPTHER

## 2020-11-17 DIAGNOSIS — G47.00 INSOMNIA: ICD-10-CM

## 2020-11-17 RX ORDER — TRAZODONE HYDROCHLORIDE 150 MG/1
TABLET ORAL
Qty: 135 TABLET | Refills: 5 | Status: SHIPPED | OUTPATIENT
Start: 2020-11-17 | End: 2021-04-21

## 2020-11-17 NOTE — TELEPHONE ENCOUNTER
Caller: Shirley Calhoun    Relationship: Self    Best call back number: [phone]    Medication needed:   Requested Prescriptions     Pending Prescriptions Disp Refills   • traZODone (DESYREL) 150 MG tablet [Pharmacy Med Name: TRAZODONE 150MG (HUNDRED-FIFTY) TAB] 135 tablet 5     Sig: TAKE 1 AND 1/2 TABLETS BY MOUTH EVERY NIGHT       When do you need the refill by: 11/17/2020    What details did the patient provide when requesting the medication: PATIENT IS STILL WAITING FOR HER PRES[facility] AND SHE IS ALSO COMPLETEY OUT OF THIS MEDICATION.  Does the patient have less than a 3 day supply:  [x] Yes  [] No    What is the patient's preferred pharmacy: [id number] - [address] - [phone] [facility] [phone] FX

## 2020-12-16 ENCOUNTER — OFFICE VISIT (OUTPATIENT)
Dept: FAMILY MEDICINE CLINIC | Facility: CLINIC | Age: 80
End: 2020-12-16

## 2020-12-16 VITALS
TEMPERATURE: 97.1 F | HEART RATE: 84 BPM | DIASTOLIC BLOOD PRESSURE: 68 MMHG | HEIGHT: 61 IN | SYSTOLIC BLOOD PRESSURE: 128 MMHG | BODY MASS INDEX: 17.94 KG/M2 | WEIGHT: 95 LBS | RESPIRATION RATE: 24 BRPM

## 2020-12-16 DIAGNOSIS — J43.1 PANLOBULAR EMPHYSEMA (HCC): ICD-10-CM

## 2020-12-16 DIAGNOSIS — R09.81 CONGESTION OF NASAL SINUS: Primary | ICD-10-CM

## 2020-12-16 PROCEDURE — 96372 THER/PROPH/DIAG INJ SC/IM: CPT | Performed by: NURSE PRACTITIONER

## 2020-12-16 PROCEDURE — 99213 OFFICE O/P EST LOW 20 MIN: CPT | Performed by: NURSE PRACTITIONER

## 2020-12-16 RX ORDER — TRIAMCINOLONE ACETONIDE 40 MG/ML
40 INJECTION, SUSPENSION INTRA-ARTICULAR; INTRAMUSCULAR ONCE
Status: COMPLETED | OUTPATIENT
Start: 2020-12-16 | End: 2020-12-16

## 2020-12-16 RX ADMIN — TRIAMCINOLONE ACETONIDE 40 MG: 40 INJECTION, SUSPENSION INTRA-ARTICULAR; INTRAMUSCULAR at 14:53

## 2020-12-16 NOTE — PROGRESS NOTES
Subjective   Shirley Calhoun is a 80 y.o. female.     History of Present Illness   Joint pain worse in the winter when it gets cold, fatigue, sinus congestion from allergies, isolated due to Covid, trying to stay safe. Feeling blue, not really eating much because she does not have an appetite, has lost a few lbs  COPD still smoking; out of her inhalers except rescue Albuterol. Taking her Vit B12 supplement that makes her feel some better. Vit C and Folate taking some days.  Requesting samples  Requesting a steroid injection to help her feel better.   The following portions of the patient's history were reviewed and updated as appropriate: allergies, current medications, past family history, past medical history, past social history, past surgical history and problem list.    Review of Systems   Constitutional: Positive for activity change, appetite change, fatigue and unexpected weight loss.   HENT: Positive for congestion, postnasal drip and sinus pressure.    Eyes: Negative.    Respiratory: Positive for shortness of breath and wheezing.    Cardiovascular: Negative.  Negative for chest pain and palpitations.   Gastrointestinal: Negative.  Negative for indigestion.   Endocrine: Positive for cold intolerance.   Genitourinary: Negative.    Musculoskeletal: Positive for arthralgias.   Allergic/Immunologic: Positive for environmental allergies.   Neurological: Negative for dizziness and headache.   Hematological: Negative.    Psychiatric/Behavioral: Positive for sleep disturbance and depressed mood. The patient is nervous/anxious.        Objective   Physical Exam  Vitals signs and nursing note reviewed.   Constitutional:       General: She is not in acute distress.     Appearance: She is not ill-appearing or toxic-appearing.      Comments: Hair is not styled, very messy, clothes are dirty as well as coat with stains on it.    HENT:      Head: Normocephalic.      Right Ear: External ear normal.      Left Ear: External ear  normal.      Nose: Nose normal.      Mouth/Throat:      Mouth: Mucous membranes are moist.   Cardiovascular:      Rate and Rhythm: Normal rate and regular rhythm.      Pulses: Normal pulses.      Heart sounds: Normal heart sounds.   Pulmonary:      Effort: Pulmonary effort is normal.      Breath sounds: Normal breath sounds.   Abdominal:      Palpations: Abdomen is soft.   Musculoskeletal:      Right lower leg: No edema.      Left lower leg: No edema.   Skin:     General: Skin is warm.      Capillary Refill: Capillary refill takes less than 2 seconds.   Neurological:      Mental Status: She is oriented to person, place, and time.   Psychiatric:         Mood and Affect: Mood is anxious and depressed. Affect is blunt.         Speech: Speech normal.         Thought Content: Thought content normal.         Cognition and Memory: Cognition normal.         Judgment: Judgment normal.           Assessment/Plan   Diagnoses and all orders for this visit:    1. Congestion of nasal sinus (Primary)  -     triamcinolone acetonide (KENALOG-40) injection 40 mg    2. Panlobular emphysema (CMS/HCC)    Discussed possible risk and benefits of steroid injections, pt verbalizes understanding.   Kenalog 40 mg IM today.  Gave pt samples of Trelegy inhaler to help get her through to the first of the year

## 2020-12-17 PROBLEM — M81.0 OSTEOPOROSIS: Status: ACTIVE | Noted: 2020-12-17

## 2021-01-29 RX ORDER — DULOXETIN HYDROCHLORIDE 60 MG/1
CAPSULE, DELAYED RELEASE ORAL
Qty: 90 CAPSULE | Refills: 1 | Status: SHIPPED | OUTPATIENT
Start: 2021-01-29 | End: 2021-08-20

## 2021-02-01 ENCOUNTER — OFFICE VISIT (OUTPATIENT)
Dept: FAMILY MEDICINE CLINIC | Facility: CLINIC | Age: 81
End: 2021-02-01

## 2021-02-01 VITALS
HEIGHT: 61 IN | RESPIRATION RATE: 18 BRPM | HEART RATE: 94 BPM | WEIGHT: 94 LBS | SYSTOLIC BLOOD PRESSURE: 140 MMHG | DIASTOLIC BLOOD PRESSURE: 94 MMHG | BODY MASS INDEX: 17.75 KG/M2 | TEMPERATURE: 98 F

## 2021-02-01 DIAGNOSIS — M79.10 MYALGIA: Primary | ICD-10-CM

## 2021-02-01 DIAGNOSIS — R68.83 CHILLS: ICD-10-CM

## 2021-02-01 DIAGNOSIS — M79.10 MYALGIA: ICD-10-CM

## 2021-02-01 LAB
EXPIRATION DATE: NORMAL
FLUAV AG NPH QL: NEGATIVE
FLUBV AG NPH QL: NEGATIVE
INTERNAL CONTROL: NORMAL
Lab: NORMAL

## 2021-02-01 PROCEDURE — 87804 INFLUENZA ASSAY W/OPTIC: CPT | Performed by: FAMILY MEDICINE

## 2021-02-01 PROCEDURE — 99213 OFFICE O/P EST LOW 20 MIN: CPT | Performed by: FAMILY MEDICINE

## 2021-02-01 NOTE — PROGRESS NOTES
Subjective   Shirley Calhoun is a 80 y.o. female.     History of Present Illness     She started to feel ill yesterday  Has had some chills, some fatigue  Some body aches, feels like she was run over  No known fever  No loss of taste nor smell  Took some ibuprofen this AM    No known sick contacts      Review of Systems    Objective   Physical Exam  Vitals signs and nursing note reviewed.   Constitutional:       General: She is not in acute distress.     Appearance: Normal appearance. She is well-developed.   HENT:      Head: Normocephalic and atraumatic.   Eyes:      Extraocular Movements: Extraocular movements intact.      Conjunctiva/sclera: Conjunctivae normal.   Cardiovascular:      Rate and Rhythm: Normal rate and regular rhythm.      Heart sounds: Normal heart sounds.   Pulmonary:      Effort: Pulmonary effort is normal.      Breath sounds: Normal breath sounds.   Abdominal:      General: Abdomen is flat. Bowel sounds are normal. There is no distension.      Palpations: Abdomen is soft. There is no mass.      Tenderness: There is no abdominal tenderness. There is no guarding or rebound.   Neurological:      Mental Status: She is alert and oriented to person, place, and time.   Psychiatric:         Mood and Affect: Mood normal.         Behavior: Behavior normal.         Thought Content: Thought content normal.         Judgment: Judgment normal.         Assessment/Plan   Diagnoses and all orders for this visit:    1. Myalgia (Primary)  -     POC Influenza A / B  -     COVID-19,[facility] ROUTINE, NP/OP SWAB IN TRANSPORT MEDIA OR ESWAB 72 HR TAT - Swab, Nasopharynx; Future    2. Chills  -     POC Influenza A / B  -     COVID-19,[facility] ROUTINE, NP/OP SWAB IN TRANSPORT MEDIA OR ESWAB 72 HR TAT - Swab, Nasopharynx; Future    exam is unremarkable, she feels ill.  No known sick contacts.  Will check for flu and COVId and f/u pending labs.  recommend fluids, rest, ibuprofen PRN, time.    Flu -, COVID pending

## 2021-02-02 LAB — SARS-COV-2 RNA RESP QL NAA+PROBE: NOT DETECTED

## 2021-02-09 ENCOUNTER — OFFICE VISIT (OUTPATIENT)
Dept: FAMILY MEDICINE CLINIC | Facility: CLINIC | Age: 81
End: 2021-02-09

## 2021-02-09 VITALS
HEART RATE: 88 BPM | HEIGHT: 61 IN | BODY MASS INDEX: 17.75 KG/M2 | RESPIRATION RATE: 14 BRPM | SYSTOLIC BLOOD PRESSURE: 112 MMHG | OXYGEN SATURATION: 97 % | TEMPERATURE: 97.7 F | WEIGHT: 94 LBS | DIASTOLIC BLOOD PRESSURE: 76 MMHG

## 2021-02-09 DIAGNOSIS — R19.4 CHANGE IN BOWEL HABIT: ICD-10-CM

## 2021-02-09 DIAGNOSIS — R10.32 LLQ ABDOMINAL PAIN: ICD-10-CM

## 2021-02-09 DIAGNOSIS — R82.998 URINE LEUKOCYTES: ICD-10-CM

## 2021-02-09 DIAGNOSIS — R10.84 GENERALIZED ABDOMINAL PAIN: Primary | ICD-10-CM

## 2021-02-09 LAB
BILIRUB BLD-MCNC: ABNORMAL MG/DL
CLARITY, POC: CLEAR
COLOR UR: YELLOW
GLUCOSE UR STRIP-MCNC: NEGATIVE MG/DL
KETONES UR QL: NEGATIVE
LEUKOCYTE EST, POC: ABNORMAL
NITRITE UR-MCNC: POSITIVE MG/ML
PH UR: 6 [PH] (ref 5–8)
PROT UR STRIP-MCNC: ABNORMAL MG/DL
RBC # UR STRIP: NEGATIVE /UL
SP GR UR: 1.03 (ref 1–1.03)
UROBILINOGEN UR QL: NORMAL

## 2021-02-09 PROCEDURE — 81003 URINALYSIS AUTO W/O SCOPE: CPT | Performed by: NURSE PRACTITIONER

## 2021-02-09 PROCEDURE — 99214 OFFICE O/P EST MOD 30 MIN: CPT | Performed by: NURSE PRACTITIONER

## 2021-02-09 RX ORDER — SUCRALFATE 1 G/1
1 TABLET ORAL 4 TIMES DAILY
Qty: 120 TABLET | Refills: 0 | Status: ON HOLD | OUTPATIENT
Start: 2021-02-09 | End: 2021-05-28

## 2021-02-09 RX ORDER — CIPROFLOXACIN 500 MG/1
500 TABLET, FILM COATED ORAL 2 TIMES DAILY
Qty: 14 TABLET | Refills: 0 | Status: SHIPPED | OUTPATIENT
Start: 2021-02-09 | End: 2021-03-04

## 2021-02-09 NOTE — PROGRESS NOTES
"Chief Complaint  Abd pain x on going and Fatigue    Subjective          Shirley Calhoun presents to [facility]  History of Present Illness  Abdominal pain in LLQ and upper abdomen, change in BM, no stool for 3 days. Worried about having a bowel blockage.  Low grade temp, few chills, HA, ST, body aches.   Weak and frail and not poor appetite. Burning with urination, no blood in urine  Eating ice cream/milk shake and continues drinking vodka daily, losing weight, last BM 3-4 days ago   No loss of taste and smell, has noticed a strong urine odor and decreased amt     Review of Systems   Constitutional: Positive for activity change, appetite change, chills, fatigue and fever. Negative for diaphoresis.   Respiratory: Negative.  Negative for cough.    Cardiovascular: Negative.    Gastrointestinal: Positive for abdominal pain, constipation and nausea. Negative for abdominal distention, blood in stool, diarrhea and vomiting.   Genitourinary: Positive for dysuria, flank pain and urgency. Negative for hematuria.   Musculoskeletal: Positive for back pain.   Neurological: Positive for weakness. Negative for dizziness.   Hematological: Negative.    negative for Covid last week    Objective   Vital Signs:   /76   Pulse 88   Temp 97.7 °F (36.5 °C)   Resp 14   Ht 153.7 cm (60.51\")   Wt 42.6 kg (94 lb)   SpO2 97%   BMI 18.05 kg/m²     Physical Exam  Vitals signs and nursing note reviewed.   Constitutional:       General: She is not in acute distress.     Appearance: Normal appearance. She is well-developed. She is not diaphoretic.   HENT:      Head: Normocephalic.      Nose: Nose normal.   Neck:      Musculoskeletal: Neck supple.   Cardiovascular:      Rate and Rhythm: Normal rate and regular rhythm.      Heart sounds: Normal heart sounds.   Pulmonary:      Effort: Pulmonary effort is normal.      Breath sounds: Normal breath sounds.   Abdominal:      General: Bowel sounds are decreased. " There is no distension.      Palpations: Abdomen is soft. There is no hepatomegaly or mass.      Tenderness: There is generalized abdominal tenderness and tenderness in the right lower quadrant, periumbilical area, left upper quadrant and left lower quadrant. There is guarding and rebound. There is no right CVA tenderness or left CVA tenderness. Negative signs include Umana's sign, psoas sign and obturator sign.      Hernia: No hernia is present.   Skin:     General: Skin is warm and dry.   Neurological:      Mental Status: She is alert.        Result Review :                 Assessment and Plan    Diagnoses and all orders for this visit:    1. Generalized abdominal pain (Primary)  -     CT Abdomen Pelvis Without Contrast    2. Urine leukocytes  -     CT Abdomen Pelvis Without Contrast  -     POCT urinalysis dipstick, automated  -     Urine Culture - Urine, Urine, Clean Catch    3. LLQ abdominal pain  -     CT Abdomen Pelvis Without Contrast  -     POCT urinalysis dipstick, automated    4. Change in bowel habit  -     CT Abdomen Pelvis Without Contrast        Follow Up   No follow-ups on file.  Patient was given instructions and counseling regarding her condition or for health maintenance advice. Please see specific information pulled into the AVS if appropriate.   Will check CT of abdomen and pelvis to evaluate for bowel obstruction/diverticulitis  UA positive leuks and nitrites, blood; will treat for UTI and possible ulcer.  Avoid NSAIDs and alcohol  Go to ER if worsening symptoms

## 2021-02-10 ENCOUNTER — TELEPHONE (OUTPATIENT)
Dept: FAMILY MEDICINE CLINIC | Facility: CLINIC | Age: 81
End: 2021-02-10

## 2021-02-10 DIAGNOSIS — J43.1 PANLOBULAR EMPHYSEMA (HCC): ICD-10-CM

## 2021-02-10 RX ORDER — OMEPRAZOLE 40 MG/1
40 CAPSULE, DELAYED RELEASE ORAL DAILY
Qty: 30 CAPSULE | Refills: 0 | Status: ON HOLD | OUTPATIENT
Start: 2021-02-10 | End: 2021-05-28

## 2021-02-10 RX ORDER — ALBUTEROL SULFATE 90 UG/1
AEROSOL, METERED RESPIRATORY (INHALATION)
Qty: 18 G | Refills: 0 | Status: SHIPPED | OUTPATIENT
Start: 2021-02-10 | End: 2021-05-11

## 2021-02-10 NOTE — TELEPHONE ENCOUNTER
PT STATED THAT SHE REQUESTED A REFILL FROM THE PHARMACY FOR AN INHALER, BUT PT STATED THE NAME IS TRELEGY.      CALL BACK:[phone]       Pharmacy:[facility] #[id number] [address] AT [facility] - [phone]  - [phone]   [phone]

## 2021-02-12 ENCOUNTER — TELEPHONE (OUTPATIENT)
Dept: FAMILY MEDICINE CLINIC | Facility: CLINIC | Age: 81
End: 2021-02-12

## 2021-02-12 LAB
BACTERIA UR CULT: ABNORMAL
BACTERIA UR CULT: ABNORMAL
OTHER ANTIBIOTIC SUSC ISLT: ABNORMAL

## 2021-02-22 DIAGNOSIS — J43.1 PANLOBULAR EMPHYSEMA (HCC): ICD-10-CM

## 2021-02-23 ENCOUNTER — TELEPHONE (OUTPATIENT)
Dept: FAMILY MEDICINE CLINIC | Facility: CLINIC | Age: 81
End: 2021-02-23

## 2021-02-23 DIAGNOSIS — J43.1 PANLOBULAR EMPHYSEMA (HCC): Primary | ICD-10-CM

## 2021-02-23 RX ORDER — TIOTROPIUM BROMIDE INHALATION SPRAY 3.12 UG/1
SPRAY, METERED RESPIRATORY (INHALATION)
Qty: 4 G | Refills: 11 | Status: SHIPPED | OUTPATIENT
Start: 2021-02-23 | End: 2021-02-23 | Stop reason: ALTCHOICE

## 2021-02-23 NOTE — TELEPHONE ENCOUNTER
Trelegy inhaler sent to pharmacy, do not use with Spiriva inhaler as they have similar medication in them. [email or web address]

## 2021-02-23 NOTE — TELEPHONE ENCOUNTER
PATIENT STATES SHE NEEDS TRELEGY PRESCRIPTION.  PATIENT OUT OF MEDICATION.      PHARMACY:  [facility]

## 2021-03-04 ENCOUNTER — OFFICE VISIT (OUTPATIENT)
Dept: FAMILY MEDICINE CLINIC | Facility: CLINIC | Age: 81
End: 2021-03-04

## 2021-03-04 VITALS
BODY MASS INDEX: 17.86 KG/M2 | SYSTOLIC BLOOD PRESSURE: 140 MMHG | WEIGHT: 94.6 LBS | HEIGHT: 61 IN | HEART RATE: 84 BPM | DIASTOLIC BLOOD PRESSURE: 80 MMHG | RESPIRATION RATE: 24 BRPM | TEMPERATURE: 98.3 F | OXYGEN SATURATION: 98 %

## 2021-03-04 DIAGNOSIS — R53.83 OTHER FATIGUE: ICD-10-CM

## 2021-03-04 DIAGNOSIS — R82.90 BAD ODOR OF URINE: ICD-10-CM

## 2021-03-04 DIAGNOSIS — R10.84 GENERALIZED ABDOMINAL PAIN: Primary | ICD-10-CM

## 2021-03-04 LAB
BILIRUB BLD-MCNC: ABNORMAL MG/DL
CLARITY, POC: CLEAR
COLOR UR: YELLOW
GLUCOSE UR STRIP-MCNC: NEGATIVE MG/DL
KETONES UR QL: NEGATIVE
LEUKOCYTE EST, POC: NEGATIVE
NITRITE UR-MCNC: NEGATIVE MG/ML
PH UR: 6 [PH] (ref 5–8)
PROT UR STRIP-MCNC: ABNORMAL MG/DL
RBC # UR STRIP: NEGATIVE /UL
SP GR UR: 1.03 (ref 1–1.03)
UROBILINOGEN UR QL: NORMAL

## 2021-03-04 PROCEDURE — 81003 URINALYSIS AUTO W/O SCOPE: CPT | Performed by: NURSE PRACTITIONER

## 2021-03-04 PROCEDURE — 99213 OFFICE O/P EST LOW 20 MIN: CPT | Performed by: NURSE PRACTITIONER

## 2021-03-04 NOTE — PROGRESS NOTES
"Chief Complaint  Follow-up, Abdominal Pain, Urinary Tract Infection, and Fatigue    Subjective          Shirley Calhoun presents to [facility]  History of Present Illness  F/U UTI and abdominal discomfort  Still feeling weak but no longer having abdominal pain  Took antibiotics, still has strong urine odor, but no burning or low back pain  Taking Carafate and Omeprazole which has eliminated the stomach pains  COPD  Still smoking- not ready to quit  Likes Trelegy inhaler but not the Spiriva  Some cough with sputum but no difficulty breathing currently    Review of Systems   Constitutional: Positive for fatigue. Negative for activity change, appetite change, chills and fever.   HENT: Negative.    Respiratory: Positive for cough. Negative for chest tightness and shortness of breath.    Cardiovascular: Negative for chest pain, palpitations and leg swelling.   Gastrointestinal: Negative for abdominal pain, constipation, diarrhea, nausea and vomiting.   Musculoskeletal: Negative for back pain.   Neurological: Negative for dizziness, speech difficulty, weakness and light-headedness.   Psychiatric/Behavioral: Positive for sleep disturbance.     Objective   Vital Signs:   /80   Pulse 84   Temp 98.3 °F (36.8 °C)   Resp 24   Ht 153.7 cm (60.51\")   Wt 42.9 kg (94 lb 9.6 oz)   SpO2 98%   BMI 18.16 kg/m²     Physical Exam  Vitals signs and nursing note reviewed.   Constitutional:       Appearance: Normal appearance. She is well-developed.   HENT:      Head: Normocephalic.      Right Ear: External ear normal.      Left Ear: External ear normal.      Nose: Nose normal.   Eyes:      General: Lids are normal.   Neck:      Musculoskeletal: Neck supple.   Cardiovascular:      Rate and Rhythm: Normal rate and regular rhythm.      Pulses: Normal pulses.      Heart sounds: Normal heart sounds, S1 normal and S2 normal. No murmur. No friction rub. No gallop.    Pulmonary:      Effort: Pulmonary " effort is normal.      Breath sounds: Normal breath sounds. No stridor. No wheezing.   Abdominal:      General: Bowel sounds are normal.      Palpations: Abdomen is soft.      Tenderness: There is no abdominal tenderness.   Lymphadenopathy:      Cervical: No cervical adenopathy.   Skin:     General: Skin is warm and dry.   Neurological:      Mental Status: She is alert and oriented to person, place, and time.   Psychiatric:         Mood and Affect: Mood normal.         Speech: Speech normal.         Behavior: Behavior normal.         Thought Content: Thought content normal.         Judgment: Judgment normal.        Result Review :                 Assessment and Plan    Diagnoses and all orders for this visit:    1. Generalized abdominal pain (Primary)    2. Bad odor of urine  -     POCT urinalysis dipstick, automated    3. Other fatigue        Follow Up   Return if symptoms worsen or fail to improve.  Patient was given instructions and counseling regarding her condition or for health maintenance advice. Please see specific information pulled into the AVS if appropriate.   UA no leuk, blood or nitrites   Pt should continue to take daily Metamucil/Benefiber and stay hydrated. Take Miralax as needed to prevent constipation. Continue to eat small meals. Avoid Alcohol   Stop Carafate but continue Omeprazole  F/U prn

## 2021-03-17 ENCOUNTER — APPOINTMENT (OUTPATIENT)
Dept: CT IMAGING | Facility: HOSPITAL | Age: 81
End: 2021-03-17

## 2021-03-17 ENCOUNTER — HOSPITAL ENCOUNTER (INPATIENT)
Facility: HOSPITAL | Age: 81
LOS: 2 days | Discharge: HOME | End: 2021-03-19
Attending: STUDENT IN AN ORGANIZED HEALTH CARE EDUCATION/TRAINING PROGRAM | Admitting: STUDENT IN AN ORGANIZED HEALTH CARE EDUCATION/TRAINING PROGRAM

## 2021-03-17 PROBLEM — I99.8 ISCHEMIC REST PAIN OF LOWER EXTREMITY: Status: ACTIVE | Noted: 2021-03-17

## 2021-03-17 PROBLEM — M79.606 ISCHEMIC REST PAIN OF LOWER EXTREMITY: Status: ACTIVE | Noted: 2021-03-17

## 2021-03-17 LAB
ANION GAP SERPL CALCULATED.3IONS-SCNC: 7 MMOL/L (ref 5–15)
BUN SERPL-MCNC: 15 MG/DL (ref 8–23)
BUN/CREAT SERPL: 34.9 (ref 7–25)
CALCIUM SPEC-SCNC: 8.9 MG/DL (ref 8.6–10.5)
CHLORIDE SERPL-SCNC: 102 MMOL/L (ref 98–107)
CO2 SERPL-SCNC: 26 MMOL/L (ref 22–29)
CREAT SERPL-MCNC: 0.43 MG/DL (ref 0.57–1)
GFR SERPL CREATININE-BSD FRML MDRD: 141 ML/MIN/1.73
GLUCOSE SERPL-MCNC: 91 MG/DL (ref 65–99)
POTASSIUM SERPL-SCNC: 4 MMOL/L (ref 3.5–5.2)
SODIUM SERPL-SCNC: 135 MMOL/L (ref 136–145)

## 2021-03-17 PROCEDURE — 85025 COMPLETE CBC W/AUTO DIFF WBC: CPT | Performed by: STUDENT IN AN ORGANIZED HEALTH CARE EDUCATION/TRAINING PROGRAM

## 2021-03-17 PROCEDURE — 25010000002 HEPARIN (PORCINE) 25000-0.45 UT/250ML-% SOLUTION: Performed by: STUDENT IN AN ORGANIZED HEALTH CARE EDUCATION/TRAINING PROGRAM

## 2021-03-17 PROCEDURE — 75635 CT ANGIO ABDOMINAL ARTERIES: CPT

## 2021-03-17 PROCEDURE — 80048 BASIC METABOLIC PNL TOTAL CA: CPT | Performed by: PHYSICIAN ASSISTANT

## 2021-03-17 PROCEDURE — 85730 THROMBOPLASTIN TIME PARTIAL: CPT | Performed by: STUDENT IN AN ORGANIZED HEALTH CARE EDUCATION/TRAINING PROGRAM

## 2021-03-17 PROCEDURE — 99223 1ST HOSP IP/OBS HIGH 75: CPT | Performed by: PHYSICIAN ASSISTANT

## 2021-03-17 PROCEDURE — 0 IOPAMIDOL PER 1 ML: Performed by: STUDENT IN AN ORGANIZED HEALTH CARE EDUCATION/TRAINING PROGRAM

## 2021-03-17 PROCEDURE — 85610 PROTHROMBIN TIME: CPT | Performed by: STUDENT IN AN ORGANIZED HEALTH CARE EDUCATION/TRAINING PROGRAM

## 2021-03-17 PROCEDURE — 85520 HEPARIN ASSAY: CPT | Performed by: STUDENT IN AN ORGANIZED HEALTH CARE EDUCATION/TRAINING PROGRAM

## 2021-03-17 RX ORDER — ACETAMINOPHEN 160 MG/5ML
650 SOLUTION ORAL EVERY 4 HOURS PRN
Status: DISCONTINUED | OUTPATIENT
Start: 2021-03-17 | End: 2021-03-19 | Stop reason: HOSPADM

## 2021-03-17 RX ORDER — NICOTINE 21 MG/24HR
1 PATCH, TRANSDERMAL 24 HOURS TRANSDERMAL
Status: DISCONTINUED | OUTPATIENT
Start: 2021-03-18 | End: 2021-03-19 | Stop reason: HOSPADM

## 2021-03-17 RX ORDER — ACETAMINOPHEN 650 MG/1
650 SUPPOSITORY RECTAL EVERY 4 HOURS PRN
Status: DISCONTINUED | OUTPATIENT
Start: 2021-03-17 | End: 2021-03-19 | Stop reason: HOSPADM

## 2021-03-17 RX ORDER — DULOXETIN HYDROCHLORIDE 60 MG/1
60 CAPSULE, DELAYED RELEASE ORAL DAILY
Status: DISCONTINUED | OUTPATIENT
Start: 2021-03-18 | End: 2021-03-19 | Stop reason: HOSPADM

## 2021-03-17 RX ORDER — POLYETHYLENE GLYCOL 3350 17 G/17G
17 POWDER, FOR SOLUTION ORAL DAILY
Status: DISCONTINUED | OUTPATIENT
Start: 2021-03-18 | End: 2021-03-19 | Stop reason: HOSPADM

## 2021-03-17 RX ORDER — DOCUSATE SODIUM 100 MG/1
100 CAPSULE, LIQUID FILLED ORAL 2 TIMES DAILY PRN
Status: DISCONTINUED | OUTPATIENT
Start: 2021-03-17 | End: 2021-03-19 | Stop reason: HOSPADM

## 2021-03-17 RX ORDER — HEPARIN SODIUM 10000 [USP'U]/100ML
17 INJECTION, SOLUTION INTRAVENOUS
Status: DISCONTINUED | OUTPATIENT
Start: 2021-03-17 | End: 2021-03-18

## 2021-03-17 RX ORDER — IPRATROPIUM BROMIDE AND ALBUTEROL SULFATE 2.5; .5 MG/3ML; MG/3ML
3 SOLUTION RESPIRATORY (INHALATION) EVERY 6 HOURS PRN
Status: DISCONTINUED | OUTPATIENT
Start: 2021-03-17 | End: 2021-03-19 | Stop reason: HOSPADM

## 2021-03-17 RX ORDER — ONDANSETRON 4 MG/1
4 TABLET, FILM COATED ORAL EVERY 6 HOURS PRN
Status: DISCONTINUED | OUTPATIENT
Start: 2021-03-17 | End: 2021-03-19 | Stop reason: HOSPADM

## 2021-03-17 RX ORDER — OXYBUTYNIN CHLORIDE 5 MG/1
5 TABLET, EXTENDED RELEASE ORAL DAILY
Status: DISCONTINUED | OUTPATIENT
Start: 2021-03-18 | End: 2021-03-19 | Stop reason: HOSPADM

## 2021-03-17 RX ORDER — ONDANSETRON 2 MG/ML
4 INJECTION INTRAMUSCULAR; INTRAVENOUS EVERY 6 HOURS PRN
Status: DISCONTINUED | OUTPATIENT
Start: 2021-03-17 | End: 2021-03-19 | Stop reason: HOSPADM

## 2021-03-17 RX ORDER — ALBUTEROL SULFATE 2.5 MG/3ML
2.5 SOLUTION RESPIRATORY (INHALATION) EVERY 6 HOURS PRN
Status: DISCONTINUED | OUTPATIENT
Start: 2021-03-17 | End: 2021-03-19 | Stop reason: HOSPADM

## 2021-03-17 RX ORDER — ACETAMINOPHEN 325 MG/1
650 TABLET ORAL EVERY 4 HOURS PRN
Status: DISCONTINUED | OUTPATIENT
Start: 2021-03-17 | End: 2021-03-19 | Stop reason: HOSPADM

## 2021-03-17 RX ORDER — PANTOPRAZOLE SODIUM 40 MG/1
40 TABLET, DELAYED RELEASE ORAL EVERY MORNING
Refills: 0 | Status: DISCONTINUED | OUTPATIENT
Start: 2021-03-18 | End: 2021-03-19 | Stop reason: HOSPADM

## 2021-03-17 RX ORDER — CETIRIZINE HYDROCHLORIDE 10 MG/1
10 TABLET ORAL DAILY
Status: DISCONTINUED | OUTPATIENT
Start: 2021-03-18 | End: 2021-03-19 | Stop reason: HOSPADM

## 2021-03-17 RX ORDER — NICOTINE 21 MG/24HR
1 PATCH, TRANSDERMAL 24 HOURS TRANSDERMAL ONCE AS NEEDED
Status: DISCONTINUED | OUTPATIENT
Start: 2021-03-17 | End: 2021-03-18

## 2021-03-17 RX ADMIN — HEPARIN SODIUM 15 UNITS/KG/HR: 10000 INJECTION, SOLUTION INTRAVENOUS at 23:46

## 2021-03-17 RX ADMIN — TRAZODONE HYDROCHLORIDE 150 MG: 100 TABLET ORAL at 23:46

## 2021-03-17 RX ADMIN — IOPAMIDOL 125 ML: 755 INJECTION, SOLUTION INTRAVENOUS at 22:57

## 2021-03-17 NOTE — NURSING NOTE
"  ACC REVIEW REPORT: [facility]        PATIENT NAME: Shirley Calhoun    PATIENT ID: [id number]        COVID-19 ACC SCREENING       DOES THE PATIENT HAVE A FEVER GREATER THAN OR EQUAL .4: no    IS THE PATIENT EXPERIENCING SHORTNESS OF BREATH: no    DOES THE PATIENT HAVE A COUGH: no  DOES THE PATIENT HAVE ANY OF THE FOLLOWING RISK FACTORS:    EXPOSURE TO SUSPECTED OR KNOWN COVID-19: no    RECENT TRAVEL HISTORY TO ENDEMIC AREA (DOMESTIC/LOCAL): no    IS THE PATIENT A HEALTHCARE WORKER: no    HAS THE PATIENT EXPERIENCED A LOSS OF SENSE OF TASTE OR SMELL: no    HAS THE PATIENT BEEN TESTED FOR COVID-19: unknown    DATE TESTED:     LAB TESTING SENT TO:     Vaccine given 2-3 weeks ago - nurse thinks it is the [place]      BED:       BED TYPE: S 457    BED GIVEN TO: telemetry    TIME BED GIVEN: 1845    TODAY'S DATE: 3/17/2021    TRANSFER DATE: 3-17-21    ETA: after 1930    TRANSFERRING FACILITY: [facility]    TRANSFERRING FACILITY PHONE # : [phone]    TRANSFERRING MD: ANGEL LUIS Cole    DATE/TIME REQUEST RECEIVED: 3-17-21@1800    [facility] RN: Sandie Montez    REPORT FROM: Emma Sauer    TIME REPORT TAKEN: 1831    DIAGNOSIS: acute limb ischemia    REASON FOR TRANSFER TO [facility]: higher level of care    TRANSPORTATION: EMS    CLINICAL REASON FOR TRANSFER TO [facility]: 81 y.o. noticed that she had leg cramps 2-3 days ago; today she was at physical therapy and she had LLE pain and noticed that her foot was reddened - she went to the ED and arterial US showed inflow dz, no blockage  There are no palpable or dopplerable pulses from the calf down LLE      CLINICAL INFORMATION    HEIGHT:     WEIGHT: 42kg    ALLERGIES: NKA    INFECTIOUS DISEASE:     ISOLATION:     VITAL SIGNS:   TIME: ~1730  TEMP: \"afebrile\"  PULSE: 87  B/P: 136/67  RESP: 18        LAB INFORMATION: bun 18, Cr 0.6, WBC 5.5, H&H: 12/37, plt 320, PT/INR: 9.1/0.9, PTT pending    CULTURE INFORMATION:     MEDS/IV FLUIDS: #18 rt ac, #20 left " "ac  Heparin bolus 3,374 units given - gtt to start at 15 units/kg      CARDIAC SYSTEM:    CHEST PAIN: no    RHYTHM: not on monitor    Is patient taking or has patient been given any drugs that could increase bleeding? No home blood thinner    DRUG: heparin given in the ED - see above     DOSE/FREQUENCY:     CARDIAC NOTES: LLE pain, rated \"3-4\", foot is red, no palpable pulses, no swelling, normal temperature      RESPIRATORY SYSTEM:    LUNG SOUNDS:     OXYGEN: no    O2 SAT: 99 % on RA    RESPIRATORY STATUS: no soa      CNS/MUSCULOSKELETAL    ALERT AND ORIENTED:  yes    CNS/MUSCULOSKELETAL NOTES: ambulatory      GI//GY      ABDOMINAL PAIN: no    VOMITING: no    DIARRHEA: no    NAUSEA: no    PAST MEDICAL HISTORY: COPD, GERD, smoker      Kat Montez, RN  3/17/2021  18:45 EDT  "

## 2021-03-18 ENCOUNTER — ANESTHESIA (OUTPATIENT)
Dept: PERIOP | Facility: HOSPITAL | Age: 81
End: 2021-03-18

## 2021-03-18 ENCOUNTER — ANESTHESIA EVENT (OUTPATIENT)
Dept: PERIOP | Facility: HOSPITAL | Age: 81
End: 2021-03-18

## 2021-03-18 ENCOUNTER — APPOINTMENT (OUTPATIENT)
Dept: GENERAL RADIOLOGY | Facility: HOSPITAL | Age: 81
End: 2021-03-18

## 2021-03-18 LAB
ABO GROUP BLD: NORMAL
ACT BLD: 98 SECONDS (ref 82–152)
APTT PPP: 77.5 SECONDS (ref 50–95)
BASOPHILS # BLD AUTO: 0.07 10*3/MM3 (ref 0–0.2)
BASOPHILS NFR BLD AUTO: 1.4 % (ref 0–1.5)
BLD GP AB SCN SERPL QL: NEGATIVE
DEPRECATED RDW RBC AUTO: 51.6 FL (ref 37–54)
EOSINOPHIL # BLD AUTO: 0.19 10*3/MM3 (ref 0–0.4)
EOSINOPHIL NFR BLD AUTO: 3.9 % (ref 0.3–6.2)
ERYTHROCYTE [DISTWIDTH] IN BLOOD BY AUTOMATED COUNT: 12.6 % (ref 12.3–15.4)
HCT VFR BLD AUTO: 32.7 % (ref 34–46.6)
HCT VFR BLD AUTO: 35.9 % (ref 34–46.6)
HGB BLD-MCNC: 10.8 G/DL (ref 12–15.9)
HGB BLD-MCNC: 11.8 G/DL (ref 12–15.9)
IMM GRANULOCYTES # BLD AUTO: 0.03 10*3/MM3 (ref 0–0.05)
IMM GRANULOCYTES NFR BLD AUTO: 0.6 % (ref 0–0.5)
INR PPP: 1.03 (ref 0.85–1.16)
LYMPHOCYTES # BLD AUTO: 0.63 10*3/MM3 (ref 0.7–3.1)
LYMPHOCYTES NFR BLD AUTO: 12.8 % (ref 19.6–45.3)
MCH RBC QN AUTO: 36.4 PG (ref 26.6–33)
MCHC RBC AUTO-ENTMCNC: 32.9 G/DL (ref 31.5–35.7)
MCV RBC AUTO: 110.8 FL (ref 79–97)
MONOCYTES # BLD AUTO: 0.65 10*3/MM3 (ref 0.1–0.9)
MONOCYTES NFR BLD AUTO: 13.2 % (ref 5–12)
NEUTROPHILS NFR BLD AUTO: 3.36 10*3/MM3 (ref 1.7–7)
NEUTROPHILS NFR BLD AUTO: 68.1 % (ref 42.7–76)
NRBC BLD AUTO-RTO: 0 /100 WBC (ref 0–0.2)
PLATELET # BLD AUTO: 308 10*3/MM3 (ref 140–450)
PMV BLD AUTO: 9.9 FL (ref 6–12)
PROTHROMBIN TIME: 13.2 SECONDS (ref 11.5–14)
RBC # BLD AUTO: 3.24 10*6/MM3 (ref 3.77–5.28)
RH BLD: POSITIVE
SARS-COV-2 RNA RESP QL NAA+PROBE: NOT DETECTED
T&S EXPIRATION DATE: NORMAL
UFH PPP CHRO-ACNC: 0.28 IU/ML (ref 0.3–0.7)
UFH PPP CHRO-ACNC: 0.45 IU/ML (ref 0.3–0.7)
WBC # BLD AUTO: 4.93 10*3/MM3 (ref 3.4–10.8)

## 2021-03-18 PROCEDURE — 86900 BLOOD TYPING SEROLOGIC ABO: CPT | Performed by: PHYSICIAN ASSISTANT

## 2021-03-18 PROCEDURE — 25010000003 LIDOCAINE 1 % SOLUTION: Performed by: STUDENT IN AN ORGANIZED HEALTH CARE EDUCATION/TRAINING PROGRAM

## 2021-03-18 PROCEDURE — 85014 HEMATOCRIT: CPT | Performed by: PHYSICIAN ASSISTANT

## 2021-03-18 PROCEDURE — 25010000002 DEXAMETHASONE PER 1 MG: Performed by: NURSE ANESTHETIST, CERTIFIED REGISTERED

## 2021-03-18 PROCEDURE — 25010000002 HEPARIN (PORCINE) 25000-0.45 UT/250ML-% SOLUTION

## 2021-03-18 PROCEDURE — 36200 PLACE CATHETER IN AORTA: CPT | Performed by: STUDENT IN AN ORGANIZED HEALTH CARE EDUCATION/TRAINING PROGRAM

## 2021-03-18 PROCEDURE — 85347 COAGULATION TIME ACTIVATED: CPT

## 2021-03-18 PROCEDURE — 75625 CONTRAST EXAM ABDOMINL AORTA: CPT | Performed by: STUDENT IN AN ORGANIZED HEALTH CARE EDUCATION/TRAINING PROGRAM

## 2021-03-18 PROCEDURE — C1894 INTRO/SHEATH, NON-LASER: HCPCS | Performed by: STUDENT IN AN ORGANIZED HEALTH CARE EDUCATION/TRAINING PROGRAM

## 2021-03-18 PROCEDURE — 25010000002 PROPOFOL 10 MG/ML EMULSION: Performed by: NURSE ANESTHETIST, CERTIFIED REGISTERED

## 2021-03-18 PROCEDURE — C1769 GUIDE WIRE: HCPCS | Performed by: STUDENT IN AN ORGANIZED HEALTH CARE EDUCATION/TRAINING PROGRAM

## 2021-03-18 PROCEDURE — 25010000002 CEFUROXIME: Performed by: PHYSICIAN ASSISTANT

## 2021-03-18 PROCEDURE — 94640 AIRWAY INHALATION TREATMENT: CPT

## 2021-03-18 PROCEDURE — 25010000002 ONDANSETRON PER 1 MG: Performed by: NURSE ANESTHETIST, CERTIFIED REGISTERED

## 2021-03-18 PROCEDURE — 86850 RBC ANTIBODY SCREEN: CPT | Performed by: PHYSICIAN ASSISTANT

## 2021-03-18 PROCEDURE — U0005 INFEC AGEN DETEC AMPLI PROBE: HCPCS | Performed by: INTERNAL MEDICINE

## 2021-03-18 PROCEDURE — 75710 ARTERY X-RAYS ARM/LEG: CPT

## 2021-03-18 PROCEDURE — B41G1ZZ FLUOROSCOPY OF LEFT LOWER EXTREMITY ARTERIES USING LOW OSMOLAR CONTRAST: ICD-10-PCS | Performed by: STUDENT IN AN ORGANIZED HEALTH CARE EDUCATION/TRAINING PROGRAM

## 2021-03-18 PROCEDURE — 86901 BLOOD TYPING SEROLOGIC RH(D): CPT | Performed by: PHYSICIAN ASSISTANT

## 2021-03-18 PROCEDURE — C1887 CATHETER, GUIDING: HCPCS | Performed by: STUDENT IN AN ORGANIZED HEALTH CARE EDUCATION/TRAINING PROGRAM

## 2021-03-18 PROCEDURE — 25010000002 HEPARIN (PORCINE) PER 1000 UNITS: Performed by: STUDENT IN AN ORGANIZED HEALTH CARE EDUCATION/TRAINING PROGRAM

## 2021-03-18 PROCEDURE — 99221 1ST HOSP IP/OBS SF/LOW 40: CPT | Performed by: NURSE PRACTITIONER

## 2021-03-18 PROCEDURE — 94799 UNLISTED PULMONARY SVC/PX: CPT

## 2021-03-18 PROCEDURE — 85018 HEMOGLOBIN: CPT | Performed by: PHYSICIAN ASSISTANT

## 2021-03-18 PROCEDURE — 75710 ARTERY X-RAYS ARM/LEG: CPT | Performed by: STUDENT IN AN ORGANIZED HEALTH CARE EDUCATION/TRAINING PROGRAM

## 2021-03-18 PROCEDURE — 85520 HEPARIN ASSAY: CPT

## 2021-03-18 PROCEDURE — U0003 INFECTIOUS AGENT DETECTION BY NUCLEIC ACID (DNA OR RNA); SEVERE ACUTE RESPIRATORY SYNDROME CORONAVIRUS 2 (SARS-COV-2) (CORONAVIRUS DISEASE [COVID-19]), AMPLIFIED PROBE TECHNIQUE, MAKING USE OF HIGH THROUGHPUT TECHNOLOGIES AS DESCRIBED BY CMS-2020-01-R: HCPCS | Performed by: INTERNAL MEDICINE

## 2021-03-18 PROCEDURE — 25010000002 HEPARIN (PORCINE) PER 1000 UNITS

## 2021-03-18 PROCEDURE — 25010000002 FENTANYL CITRATE (PF) 100 MCG/2ML SOLUTION: Performed by: NURSE ANESTHETIST, CERTIFIED REGISTERED

## 2021-03-18 PROCEDURE — 86923 COMPATIBILITY TEST ELECTRIC: CPT

## 2021-03-18 RX ORDER — PROPOFOL 10 MG/ML
VIAL (ML) INTRAVENOUS AS NEEDED
Status: DISCONTINUED | OUTPATIENT
Start: 2021-03-18 | End: 2021-03-18 | Stop reason: SURG

## 2021-03-18 RX ORDER — SODIUM CHLORIDE 450 MG/100ML
100 INJECTION, SOLUTION INTRAVENOUS CONTINUOUS
Status: DISCONTINUED | OUTPATIENT
Start: 2021-03-18 | End: 2021-03-19 | Stop reason: HOSPADM

## 2021-03-18 RX ORDER — LIDOCAINE HYDROCHLORIDE 10 MG/ML
INJECTION, SOLUTION INFILTRATION; PERINEURAL AS NEEDED
Status: DISCONTINUED | OUTPATIENT
Start: 2021-03-18 | End: 2021-03-18 | Stop reason: HOSPADM

## 2021-03-18 RX ORDER — LIDOCAINE HYDROCHLORIDE 10 MG/ML
0.5 INJECTION, SOLUTION EPIDURAL; INFILTRATION; INTRACAUDAL; PERINEURAL ONCE AS NEEDED
Status: CANCELLED | OUTPATIENT
Start: 2021-03-18

## 2021-03-18 RX ORDER — BUPIVACAINE HCL/0.9 % NACL/PF 0.125 %
PLASTIC BAG, INJECTION (ML) EPIDURAL AS NEEDED
Status: DISCONTINUED | OUTPATIENT
Start: 2021-03-18 | End: 2021-03-18 | Stop reason: SURG

## 2021-03-18 RX ORDER — LORAZEPAM 2 MG/ML
0.5 INJECTION INTRAMUSCULAR
Status: DISCONTINUED | OUTPATIENT
Start: 2021-03-18 | End: 2021-03-19 | Stop reason: HOSPADM

## 2021-03-18 RX ORDER — LIDOCAINE HYDROCHLORIDE 10 MG/ML
INJECTION, SOLUTION EPIDURAL; INFILTRATION; INTRACAUDAL; PERINEURAL AS NEEDED
Status: DISCONTINUED | OUTPATIENT
Start: 2021-03-18 | End: 2021-03-18 | Stop reason: SURG

## 2021-03-18 RX ORDER — LORAZEPAM 0.5 MG/1
0.5 TABLET ORAL
Status: DISCONTINUED | OUTPATIENT
Start: 2021-03-18 | End: 2021-03-19 | Stop reason: HOSPADM

## 2021-03-18 RX ORDER — DIPHENOXYLATE HYDROCHLORIDE AND ATROPINE SULFATE 2.5; .025 MG/1; MG/1
1 TABLET ORAL DAILY
Status: DISCONTINUED | OUTPATIENT
Start: 2021-03-19 | End: 2021-03-19 | Stop reason: HOSPADM

## 2021-03-18 RX ORDER — ARFORMOTEROL TARTRATE 15 UG/2ML
15 SOLUTION RESPIRATORY (INHALATION)
Status: DISCONTINUED | OUTPATIENT
Start: 2021-03-18 | End: 2021-03-19 | Stop reason: HOSPADM

## 2021-03-18 RX ORDER — LORAZEPAM 2 MG/ML
1 INJECTION INTRAMUSCULAR
Status: DISCONTINUED | OUTPATIENT
Start: 2021-03-18 | End: 2021-03-19 | Stop reason: HOSPADM

## 2021-03-18 RX ORDER — SODIUM CHLORIDE, SODIUM LACTATE, POTASSIUM CHLORIDE, CALCIUM CHLORIDE 600; 310; 30; 20 MG/100ML; MG/100ML; MG/100ML; MG/100ML
INJECTION, SOLUTION INTRAVENOUS CONTINUOUS PRN
Status: DISCONTINUED | OUTPATIENT
Start: 2021-03-18 | End: 2021-03-18 | Stop reason: SURG

## 2021-03-18 RX ORDER — DEXAMETHASONE SODIUM PHOSPHATE 4 MG/ML
INJECTION, SOLUTION INTRA-ARTICULAR; INTRALESIONAL; INTRAMUSCULAR; INTRAVENOUS; SOFT TISSUE AS NEEDED
Status: DISCONTINUED | OUTPATIENT
Start: 2021-03-18 | End: 2021-03-18 | Stop reason: SURG

## 2021-03-18 RX ORDER — EPHEDRINE SULFATE 50 MG/ML
INJECTION, SOLUTION INTRAVENOUS AS NEEDED
Status: DISCONTINUED | OUTPATIENT
Start: 2021-03-18 | End: 2021-03-18 | Stop reason: SURG

## 2021-03-18 RX ORDER — SODIUM CHLORIDE, SODIUM LACTATE, POTASSIUM CHLORIDE, CALCIUM CHLORIDE 600; 310; 30; 20 MG/100ML; MG/100ML; MG/100ML; MG/100ML
9 INJECTION, SOLUTION INTRAVENOUS CONTINUOUS
Status: CANCELLED | OUTPATIENT
Start: 2021-03-18

## 2021-03-18 RX ORDER — HYDROMORPHONE HYDROCHLORIDE 1 MG/ML
0.5 INJECTION, SOLUTION INTRAMUSCULAR; INTRAVENOUS; SUBCUTANEOUS
Status: DISCONTINUED | OUTPATIENT
Start: 2021-03-18 | End: 2021-03-18 | Stop reason: HOSPADM

## 2021-03-18 RX ORDER — SODIUM CHLORIDE 0.9 % (FLUSH) 0.9 %
10 SYRINGE (ML) INJECTION AS NEEDED
Status: CANCELLED | OUTPATIENT
Start: 2021-03-18

## 2021-03-18 RX ORDER — LORAZEPAM 1 MG/1
1 TABLET ORAL
Status: DISCONTINUED | OUTPATIENT
Start: 2021-03-18 | End: 2021-03-19 | Stop reason: HOSPADM

## 2021-03-18 RX ORDER — MIDAZOLAM HYDROCHLORIDE 1 MG/ML
0.5 INJECTION INTRAMUSCULAR; INTRAVENOUS
Status: CANCELLED | OUTPATIENT
Start: 2021-03-18

## 2021-03-18 RX ORDER — SODIUM CHLORIDE 0.9 % (FLUSH) 0.9 %
10 SYRINGE (ML) INJECTION EVERY 12 HOURS SCHEDULED
Status: CANCELLED | OUTPATIENT
Start: 2021-03-18

## 2021-03-18 RX ORDER — FOLIC ACID 1 MG/1
1 TABLET ORAL DAILY
Status: DISCONTINUED | OUTPATIENT
Start: 2021-03-19 | End: 2021-03-19 | Stop reason: HOSPADM

## 2021-03-18 RX ORDER — HEPARIN SODIUM 1000 [USP'U]/ML
1500 INJECTION, SOLUTION INTRAVENOUS; SUBCUTANEOUS ONCE
Status: COMPLETED | OUTPATIENT
Start: 2021-03-18 | End: 2021-03-18

## 2021-03-18 RX ORDER — FENTANYL CITRATE 50 UG/ML
INJECTION, SOLUTION INTRAMUSCULAR; INTRAVENOUS AS NEEDED
Status: DISCONTINUED | OUTPATIENT
Start: 2021-03-18 | End: 2021-03-18 | Stop reason: SURG

## 2021-03-18 RX ORDER — HYDROCODONE BITARTRATE AND ACETAMINOPHEN 5; 325 MG/1; MG/1
1 TABLET ORAL ONCE AS NEEDED
Status: COMPLETED | OUTPATIENT
Start: 2021-03-18 | End: 2021-03-18

## 2021-03-18 RX ORDER — BUDESONIDE 0.5 MG/2ML
0.5 INHALANT ORAL
Status: DISCONTINUED | OUTPATIENT
Start: 2021-03-18 | End: 2021-03-19 | Stop reason: HOSPADM

## 2021-03-18 RX ORDER — ONDANSETRON 2 MG/ML
INJECTION INTRAMUSCULAR; INTRAVENOUS AS NEEDED
Status: DISCONTINUED | OUTPATIENT
Start: 2021-03-18 | End: 2021-03-18 | Stop reason: SURG

## 2021-03-18 RX ORDER — NICOTINE 21 MG/24HR
1 PATCH, TRANSDERMAL 24 HOURS TRANSDERMAL
Status: DISCONTINUED | OUTPATIENT
Start: 2021-03-18 | End: 2021-03-18

## 2021-03-18 RX ORDER — MIDAZOLAM HYDROCHLORIDE 1 MG/ML
1 INJECTION INTRAMUSCULAR; INTRAVENOUS
Status: CANCELLED | OUTPATIENT
Start: 2021-03-18

## 2021-03-18 RX ADMIN — CETIRIZINE HYDROCHLORIDE 10 MG: 10 TABLET, FILM COATED ORAL at 08:31

## 2021-03-18 RX ADMIN — FENTANYL CITRATE 50 MCG: 50 INJECTION, SOLUTION INTRAMUSCULAR; INTRAVENOUS at 15:25

## 2021-03-18 RX ADMIN — Medication 160 MCG: at 15:35

## 2021-03-18 RX ADMIN — LORAZEPAM 1 MG: 1 TABLET ORAL at 20:52

## 2021-03-18 RX ADMIN — EPHEDRINE SULFATE 15 MG: 50 INJECTION, SOLUTION INTRAVENOUS at 15:32

## 2021-03-18 RX ADMIN — PROPOFOL 50 MG: 10 INJECTION, EMULSION INTRAVENOUS at 15:25

## 2021-03-18 RX ADMIN — IPRATROPIUM BROMIDE 0.5 MG: 0.5 SOLUTION RESPIRATORY (INHALATION) at 18:43

## 2021-03-18 RX ADMIN — HEPARIN SODIUM 1500 UNITS: 1000 INJECTION, SOLUTION INTRAVENOUS; SUBCUTANEOUS at 02:31

## 2021-03-18 RX ADMIN — PANTOPRAZOLE SODIUM 40 MG: 40 TABLET, DELAYED RELEASE ORAL at 05:49

## 2021-03-18 RX ADMIN — EPHEDRINE SULFATE 15 MG: 50 INJECTION, SOLUTION INTRAVENOUS at 15:46

## 2021-03-18 RX ADMIN — BUDESONIDE 0.5 MG: 0.5 INHALANT RESPIRATORY (INHALATION) at 23:24

## 2021-03-18 RX ADMIN — ONDANSETRON 4 MG: 2 INJECTION INTRAMUSCULAR; INTRAVENOUS at 15:20

## 2021-03-18 RX ADMIN — OXYBUTYNIN CHLORIDE 5 MG: 5 TABLET, EXTENDED RELEASE ORAL at 08:31

## 2021-03-18 RX ADMIN — LIDOCAINE HYDROCHLORIDE 50 MG: 10 INJECTION, SOLUTION EPIDURAL; INFILTRATION; INTRACAUDAL; PERINEURAL at 15:15

## 2021-03-18 RX ADMIN — Medication 1 PATCH: at 08:32

## 2021-03-18 RX ADMIN — CEFUROXIME 1.5 G: 1.5 INJECTION, POWDER, FOR SOLUTION INTRAVENOUS at 15:17

## 2021-03-18 RX ADMIN — POLYETHYLENE GLYCOL 3350 17 G: 17 POWDER, FOR SOLUTION ORAL at 20:54

## 2021-03-18 RX ADMIN — DEXAMETHASONE SODIUM PHOSPHATE 4 MG: 4 INJECTION, SOLUTION INTRA-ARTICULAR; INTRALESIONAL; INTRAMUSCULAR; INTRAVENOUS; SOFT TISSUE at 15:20

## 2021-03-18 RX ADMIN — IPRATROPIUM BROMIDE 0.5 MG: 0.5 SOLUTION RESPIRATORY (INHALATION) at 23:25

## 2021-03-18 RX ADMIN — PROPOFOL 80 MG: 10 INJECTION, EMULSION INTRAVENOUS at 15:15

## 2021-03-18 RX ADMIN — SODIUM CHLORIDE 100 ML/HR: 4.5 INJECTION, SOLUTION INTRAVENOUS at 18:11

## 2021-03-18 RX ADMIN — DULOXETINE HYDROCHLORIDE 60 MG: 60 CAPSULE, DELAYED RELEASE ORAL at 08:31

## 2021-03-18 RX ADMIN — TRAZODONE HYDROCHLORIDE 150 MG: 100 TABLET ORAL at 20:51

## 2021-03-18 RX ADMIN — SODIUM CHLORIDE, POTASSIUM CHLORIDE, SODIUM LACTATE AND CALCIUM CHLORIDE: 600; 310; 30; 20 INJECTION, SOLUTION INTRAVENOUS at 15:07

## 2021-03-18 RX ADMIN — POLYETHYLENE GLYCOL 3350 17 G: 17 POWDER, FOR SOLUTION ORAL at 08:31

## 2021-03-18 RX ADMIN — Medication 160 MCG: at 15:46

## 2021-03-18 RX ADMIN — FENTANYL CITRATE 50 MCG: 50 INJECTION, SOLUTION INTRAMUSCULAR; INTRAVENOUS at 15:15

## 2021-03-18 RX ADMIN — HEPARIN SODIUM 17 UNITS/KG/HR: 10000 INJECTION, SOLUTION INTRAVENOUS at 04:35

## 2021-03-18 RX ADMIN — HYDROCODONE BITARTRATE AND ACETAMINOPHEN 1 TABLET: 5; 325 TABLET ORAL at 17:52

## 2021-03-18 RX ADMIN — ARFORMOTEROL TARTRATE 15 MCG: 15 SOLUTION RESPIRATORY (INHALATION) at 18:43

## 2021-03-18 RX ADMIN — THIAMINE HCL TAB 100 MG 100 MG: 100 TAB at 08:37

## 2021-03-18 NOTE — OP NOTE
DATE OF PROCEDURE: March 18, 2021     PREOPERATIVE DIAGNOSES:  1.  Peripheral vascular disease with claudication  2.  Active smoker  3.  Alcohol abuse     POSTOPERATIVE DIAGNOSES:    Same     PROCEDURES PERFORMED:    1.  Right common femoral arterial catheter placement  2.  Attempted aortogram     SURGEON: Jarad Andrade MD       ASSISTANTS:    Assistant: Armando Mae  was responsible for performing the following activities: Retraction and Placing Dressing and their skilled assistance was necessary for the success of this case.    Circulator: Deepti Westbrook RN  Radiology Technologist: Chel Celestin RT  Scrub Person: Georgia King    ANESTHESIA: General endotracheal anesthesia     ESTIMATED BLOOD LOSS: 10 mL.      FLUOROSCOPY TIME: 3 minutes.       RADIATION DOSE: 19 mGY    CONTRAST USE: 30 mL     Indications: This is a 81-year-old female transferred emergently for claudication.  A CT angiogram demonstrated a occluded right iliac.  Her left iliac was thought to be open and her left side was the symptomatic side.  Was recommended that she undergo a aortogram with possible intervention.       DESCRIPTION OF PROCEDURE:    She was brought to the operating room and placed in supine position and LMA was induced without difficulty.  The skin overlying the left common femoral artery was anesthetized with 1% lidocaine.  A ultrasound was used and a needle and Seldinger technique to access the left common femoral artery.  A wire threaded easily to about at the midportion where the J-loop could be seen abutting calcium in the left iliac artery.  The 4 Sudanese catheter was then inserted to that point and an angiogram obtained.  There is dense calcification nearly obscuring the entire vessel but passage into the abdominal aorta and filling of the large lumbar arteries.    Next, a Glidewire was used to passed the calcium.  It was able to be threaded across the calcium and into the aorta where it followed the  curve of the aorta and abruptly terminated below the SMA.  A 4 Upper sorbian pigtail catheter was then advanced on the wire with relative ease.  When we took the Glidewire out, the pigtail did not coil as would be expected.  A hand-injection demonstrated a subintimal dissection.  The lumbar arteries and the left iliac still filled.  The catheter was then removed and hand injection in the sheath was performed demonstrating patency of both lumbar arteries, and iliac artery.  At this point was not felt to be safe to continue with any further catheter based therapies as a Glidewire would not be expected to have a subintimal dissection, and it would not be safe to pass a stiffer wire.  We felt that any further angiographic attempts could not be performed safely as access was too difficult to gain safe control proximally distally.  We do not feel that she would tolerate a open operation for this point the catheter was removed and pressure held and she was awakened taken to PACU in satisfactory condition.

## 2021-03-18 NOTE — CONSULTS
[facility] Medicine Services  CONSULT NOTE      Patient Name: Shirley Calhoun  : 1940  MRN: [id number]    Primary Care Physician: Lara Kline APRN  Provider requesting consultation: Jarad Andrade MD    Subjective   Subjective   Reason for Consultation:  Medical Management for alcohol dependence, tobacco dependence, COPD and depression    HPI:  Shirley Calhoun is a 81 y.o. female with past medical history for COPD, depression, tobacco dependence, alcohol dependence, hx of breast cancer who presented to OSH for left foot pain.  She was admitted then transferred to [facility] for arterial issues.  Hospital medicine was consulted for possible alcohol withdrawal and other medical management problems.    Review of Systems  Gen- No fevers, chills  CV- No chest pain, palpitations  Resp- No cough, dyspnea  GI- No N/V/D, abd pain  All other systems have been reviewed and the pertinent positives and negatives are listed above in the HPI or ROS  Personal History     Past Medical History:   Diagnosis Date   • Anxiety    • Arthritis    • Bowel obstruction (CMS/HCC)    • COPD (chronic obstructive pulmonary disease) (CMS/HCC)    • Depression    • Fatigue    • GERD (gastroesophageal reflux disease)    • Hip pain    • Hx of colonic polyps    • Malignant neoplasm of breast, left    • Wears dentures    • Wears eyeglasses        Past Surgical History:   Procedure Laterality Date   • BACK SURGERY     • BRAIN SURGERY      [facility]   • CATARACT EXTRACTION Bilateral    • COLONOSCOPY      1 year ago   • DILATATION AND CURETTAGE     • KNEE SURGERY Right    • MASTECTOMY Bilateral    • TOTAL HIP ARTHROPLASTY Right 2017    Procedure: RIGHT TOTAL HIP ARTHROPLASTY ANTERIOR;  Surgeon: Nii Nayak MD;  Location: [facility];  Service:        Family History: family history includes Alcohol abuse in her father; No Known Problems in her brother, daughter, mother, sister, and son. Otherwise pertinent FHx was reviewed  and unremarkable.     Social History:  reports that she has been smoking cigarettes. She has a 50.00 pack-year smoking history. She has never used smokeless tobacco. She reports current alcohol use of about 28.0 standard drinks of alcohol per week. She reports that she does not use drugs.    Medications:  Calcium, Cyanocobalamin, DULoxetine, Fluticasone-Umeclidin-Vilant, Milk Thistle, Multiple Vitamins-Minerals, Potassium Gluconate, QUEtiapine, albuterol sulfate HFA, docusate sodium, loratadine, meclizine, omeprazole, oxybutynin XL, polyethylene glycol, sucralfate, thiamine, traZODone, triamcinolone, and zolpidem    Scheduled Meds:cefuroxime, 1.5 g, Intravenous, Once  cetirizine, 10 mg, Oral, Daily  DULoxetine, 60 mg, Oral, Daily  [START ON 3/19/2021] thiamine, 100 mg, Oral, Daily   And  [START ON 3/19/2021] multivitamin, 1 tablet, Oral, Daily   And  [START ON 3/19/2021] folic acid, 1 mg, Oral, Daily  nicotine, 1 patch, Transdermal, Q24H  oxybutynin XL, 5 mg, Oral, Daily  pantoprazole, 40 mg, Oral, QAM  polyethylene glycol, 17 g, Oral, Daily  traZODone, 150 mg, Oral, Nightly      Continuous Infusions:heparin, 17 Units/kg/hr, Last Rate: 17 Units/kg/hr (03/18/21 0435)  Pharmacy to Dose Heparin,       PRN Meds:.•  acetaminophen **OR** acetaminophen **OR** acetaminophen  •  albuterol  •  docusate sodium  •  ipratropium-albuterol  •  LORazepam **OR** LORazepam **OR** LORazepam **OR** LORazepam **OR** LORazepam **OR** LORazepam  •  ondansetron **OR** ondansetron  •  Pharmacy to Dose Heparin    No Known Allergies    Objective   Objective   Vital Signs:   Temp:  [97.7 °F (36.5 °C)-98.3 °F (36.8 °C)] 97.7 °F (36.5 °C)  Heart Rate:  [67-81] 73  Resp:  [18] 18  BP: (135-158)/(65-75) 150/72  Physical Exam  Constitutional: Alert, appears younger than stated age thin female sitting up in bed in NAD  Eyes: EOMI, sclerae anicteric, no conjunctival injection  Head: NCAT  ENT: [place], moist mucous membranes   Respiratory: Nonlabored,  symmetrical chest expansion, CTAB  Cardiovascular: RRR, no M/R/G, +DP pulses bilaterally  Gastrointestinal: Soft, NT, ND +BS  Musculoskeletal: RODGERS; no LE edema bilaterally  Neurologic: Oriented x4, strength symmetric in all extremities, follows all commands, speech clear  Skin: No rashes on exposed skin; left 5th toe black blister/lesion; erythematous left great toe  Psychiatric: Pleasant and cooperative; normal affect     Result Review:  I have personally reviewed the results from the time of admission and agree with these findings:  [x]  Laboratory  [x]  Radiology  [x]  EKG/Telemetry   []  Pathology  [x]  Old records  []  Other:  Most notable findings include:   LAB RESULTS:      Lab 03/18/21  0833 03/17/21  2334   WBC  --  4.93   HEMOGLOBIN  --  11.8*   HEMATOCRIT  --  35.9   PLATELETS  --  308   NEUTROS ABS  --  3.36   IMMATURE GRANS (ABS)  --  0.03   LYMPHS ABS  --  0.63*   MONOS ABS  --  0.65   EOS ABS  --  0.19   MCV  --  110.8*   PROTIME  --  13.2   APTT  --  77.5   HEPARIN ANTI-XA 0.45 0.28*         Lab 03/17/21  2115   SODIUM 135*   POTASSIUM 4.0   CHLORIDE 102   CO2 26.0   ANION GAP 7.0   BUN 15   CREATININE 0.43*   GLUCOSE 91   CALCIUM 8.9             Lab 03/17/21  2334   PROTIME 13.2   INR 1.03             Lab 03/18/21  1036   ABO TYPING B   RH TYPING Positive   ANTIBODY SCREEN Negative         Brief Urine Lab Results  (Last result in the past 365 days)      Color   Clarity   Blood   Leuk Est   Nitrite   Protein   CREAT   Urine HCG        03/04/21 1136 Yellow Clear Negative Negative Negative Trace             Microbiology Results (last 10 days)     Procedure Component Value - Date/Time    COVID PRE-OP / PRE-PROCEDURE SCREENING ORDER (NO ISOLATION) - Swab, Nasopharynx [[id number]]  (Normal) Collected: 03/18/21 0954    Lab Status: Final result Specimen: Swab from Nasopharynx Updated: 03/18/21 1035    Narrative:      The following orders were created for panel order COVID PRE-OP / PRE-PROCEDURE SCREENING  ORDER (NO ISOLATION) - Swab, Nasopharynx.  Procedure                               Abnormality         Status                     ---------                               -----------         ------                     COVID-19,CEPHEID,MICHELLE IN-...[[id number]]  Normal              Final result                 Please view results for these tests on the individual orders.    COVID-19,CEPHEID,MICHELLE IN-HOUSE(OR EMERGENT/ADD-ON),NP SWAB IN TRANSPORT MEDIA 3-4 HR TAT - Swab, Nasopharynx [[id number]]  (Normal) Collected: 03/18/21 0954    Lab Status: Final result Specimen: Swab from Nasopharynx Updated: 03/18/21 1035     COVID19 Not Detected    Narrative:      Fact sheet for providers: https://www.fda.gov/media/174172/download     Fact sheet for patients: https://www.fda.gov/media/470586/download          CT Angio Abdominal Aorta Bilateral Iliofem Runoff    Result Date: 3/18/2021  CTA ABDOMEN, PELVIS AND LOWER EXTREMITY RUNOFF, HISTORY: Ischemic left lower extremity TECHNIQUE: CT angiogram of the abdomen and pelvis with lower extremity runoff using IV contrast. 3-D postprocessing was performed and reviewed. Radiation dose reduction techniques included automated exposure control or exposure modulation based on body size. Radiation audit for CT and nuclear cardiology exams in the last 12 months: 0. AORTOILIAC VESSELS: Abdominal aorta is normal in size. There are extensive calcified plaque formations. The celiac artery and superior mesenteric artery are patent. There is moderate stenosis at the origin of the SMA. The inferior mesenteric artery is patent. There is no significant aortic stenosis. There are calcified plaques at the origins of each renal artery with moderate stenosis. The right common iliac artery is occluded. There is extensive plaque formation in all stable stenoses in the left common iliac artery. The left external iliac artery is patent but has numerous calcified plaque formations. The right external iliac artery is  occluded. RIGHT LEG VESSELS: The right common femoral artery is reconstituted. The superficial femoral artery is occluded. Profunda femoral artery is patent and there is reconstitution of the distal right SFA. Extensive calcified plaque formation throughout the popliteal artery and the right moderate stenosis. The tibioperoneal trunk is patent. The anterior tibial artery extends down to the foot. The posterior tibial artery has multiple occlusions. The peroneal artery is patent down to the ankle. LEFT LEG VESSELS: The common femoral artery is patent. The superficial femoral artery is occluded. The profundus femoral artery is patent. The top 2 artery appears occluded. There is reconstitution of the tibial peroneal trunk. Trifurcation vessels are visible. There are multiple stenoses and occlusions in the posterior tibial artery. Anterior tibial artery does extend down into the foot. The peroneal artery is patent down to the ankle ABDOMEN FINDINGS: The lung bases are clear. The liver, gallbladder, spleen, pancreas, adrenal glands and kidneys are normal. There is no retroperitoneal adenopathy. Bowel is normal. PELVIS FINDINGS: Uterus and adnexal regions are normal. Bladder is collapsed.. There is a right hip with placement present. There are degenerative changes lumbar spine.     Impression: Narrowing of the origin of the SMA. Extensive calcified plaque formation throughout the arterial structures Right common iliac artery and external iliac artery occluded. Left common iliac artery and external artery are patent but have multiple areas of stenosis Both superficial femoral arteries are occluded. On the right side popliteal artery appears patent and the left side popliteal artery appears to be occluded. Anterior tibial runoff into the foot is visible bilaterally. Posterior tibial artery has multiple stenoses and occlusions bilaterally. The peroneal arteries are visible bilaterally and extend down to the ankle. Tabitha  "Name: William Perez MD  Signed: 3/18/2021 12:02 AM  Workstation Name: [email or web address]  [facility]    Assessment/Plan   Assessment & Plan     Active Hospital Problems    Diagnosis  POA   • **Ischemic rest pain of lower extremity [M79.606, I99.8]  Yes   • Alcohol abuse, daily use [F10.10]  Yes   • Tobacco abuse [Z72.0]  Yes   • Chronic obstructive pulmonary disease (CMS/HCC) [J44.9]  Yes   • Depression [F32.9]  Yes      Resolved Hospital Problems   No resolved problems to display.     Ischemic RLE  --Followed by CTS/Vascular  --Heparin gtt    Alcohol Dependence  --Daily use; drinks 3 vodkas a day  --CIWA protocol    COPD  Tobacco Dependence  --continue home dose HFA and \"trelegy\" combo (similar to home medication)  --Scheduled and PRN nebulizers  --NRT  --Tobacco Cessation Education    Depression  --continue home dose Cymbalta and Trazodone  --consider restarting home dose Seroquel     Thank you for allowing [facility] Medicine Service to provide consultative care for your patient, we will continue to follow while clinically appropriate.    Emma Rodgers, APRN  03/18/21            "

## 2021-03-18 NOTE — PLAN OF CARE
Goal Outcome Evaluation:  Plan of Care Reviewed With: patient  Progress: no change  Outcome Summary: Patient admitted from [facility] with left lower limb ischemia. Vss, Sr Ra. Pulses weak per doppler in left foot. Patient on heprin gtt which was continued at 15 units/kg/hr. Iv started in right hand. Cta completed per md's order. Ciwa scale is at a 4 on admission. Will continue to monitor

## 2021-03-18 NOTE — H&P
CTS H&P     Patient Care Team:  Lara Kline APRN as PCP - General (Family Medicine)  Referring MD:    Chief Complaint: Left foot pain and discoloration    HPI  Patient is a 81 y.o. female with a history of skin cancer, right breast cancer, longstanding current daily EtOH use, tobacco use and  COPD.  Patient states for the last 3 to 4 days she has had pain and discomfort in her left lower extremity below the knee.  She states that she has compression of the disc in her back and regularly sees physical therapy.  With Covid her visits have been minimal recently.  Admittedly her activity levels have been decreased significantly in the recent past.  With the discomfort in her leg and ankle, today she noted increased redness discoloration.  She did get out today and go to physical therapy.  She states that the therapist did not like the looks of her leg and encouraged her to seek medical attention.  She was seen and evaluated at [facility] where it was reported that the patient had no palpable or dopplerable pulses in her left lower extremity.  Reportedly ultrasound was performed which showed inflow disease but no blockage.  She was started on IV heparin.  With concern for an acute ischemic changes to her lower extremity Dr. Andrade was consulted via telephone and asked to accept the patient in evaluation.  Patient presents here tonight in her usual state of health with noted slight improvement in her left lower extremity.  She is continued on IV heparin here.  On evaluation the patient denies any significant pain.  Noted to be a slight erythematous changes to her left great toe with a small circular blackish blister noted on the left fifth toe.  Weak but dopplerable pulses were noted on the anterior tibial, dorsalis pedis and posterior tibial arteries.  She denies any recent fever shakes or chills.  Denies chest pain shortness of breath.  Recently did have some abdominal pain and was treated with  Carafate and omeprazole.  Recently treated for a urinary tract infection.  Patient is a current everyday smoker.  She admits to daily drinking.  States she will have 3-4 large vodka drinks daily.    Review of Systems  Pertinent items are noted in HPI.    A comprehensive review of systems was negative except for:   Constitutional: positive for poor appetite  Respiratory: positive for cough  Cardiovascular: positive for fatigue  Gastrointestinal: positive for abdominal pain  Hematologic/lymphatic: Negative   musculoskeletal: positive for arthralgias  Neurological: Negative for weakness, dizziness  Allergic/Immunologic: Negative    History  Past Medical History:   Diagnosis Date   • Anxiety    • Arthritis    • Bowel obstruction (CMS/HCC)    • COPD (chronic obstructive pulmonary disease) (CMS/HCC)    • Depression    • Fatigue    • GERD (gastroesophageal reflux disease)    • Hip pain    • Hx of colonic polyps    • Malignant neoplasm of breast, left    • Wears dentures    • Wears eyeglasses      Past Surgical History:   Procedure Laterality Date   • BACK SURGERY     • BRAIN SURGERY      [facility]   • CATARACT EXTRACTION Bilateral 2013   • COLONOSCOPY      1 year ago   • DILATATION AND CURETTAGE     • KNEE SURGERY Right    • MASTECTOMY Bilateral    • TOTAL HIP ARTHROPLASTY Right 5/1/2017    Procedure: RIGHT TOTAL HIP ARTHROPLASTY ANTERIOR;  Surgeon: Nii Nayak MD;  Location: [facility];  Service:      Family History   Problem Relation Age of Onset   • No Known Problems Mother    • Alcohol abuse Father    • No Known Problems Sister    • No Known Problems Brother    • No Known Problems Daughter    • No Known Problems Son      Social History     Tobacco Use   • Smoking status: Current Every Day Smoker     Packs/day: 1.00     Years: 50.00     Pack years: 50.00     Types: Cigarettes   • Smokeless tobacco: Never Used   Substance Use Topics   • Alcohol use: Yes     Alcohol/week: 28.0 standard drinks     Types: 28 Shots of liquor per  week     Comment: hx of alcohol abuse drinking vodka daily   • Drug use: No     Medications Prior to Admission   Medication Sig Dispense Refill Last Dose   • albuterol sulfate  (90 Base) MCG/ACT inhaler INHALE 2 PUFFS BY MOUTH EVERY 4 HOURS AS NEEDED FOR WHEEZING 18 g 0    • Calcium 600-400 MG-UNIT chewable tablet Chew 1 tablet 2 (Two) Times a Day As Needed. (Patient not taking: Reported on 3/17/2021)   Not Taking at Unknown time   • Cyanocobalamin (VITAMIN B 12 PO) Take  by mouth.      • docusate sodium 100 MG capsule Take 100 mg by mouth 2 (Two) Times a Day. 60 capsule 0    • DULoxetine (CYMBALTA) 60 MG capsule TAKE 1 CAPSULE BY MOUTH EVERY DAY 90 capsule 1    • Fluticasone-Umeclidin-Vilant 100-62.5-25 MCG/INH aerosol powder  Inhale 1 puff Daily. 60 each 5    • loratadine (CLARITIN) 10 MG tablet Take 10 mg by mouth Daily.      • meclizine (ANTIVERT) 25 MG tablet TAKE 1 TABLET BY MOUTH THREE TIMES DAILY AS NEEDED FOR DIZZINESS (Patient not taking: Reported on 3/17/2021) 30 tablet 0 Not Taking at Unknown time   • MILK THISTLE PO Take  by mouth.      • Multiple Vitamins-Minerals (AIRBORNE PO) Take  by mouth. (Patient not taking: Reported on 3/17/2021)   Not Taking at Unknown time   • omeprazole (priLOSEC) 40 MG capsule Take 1 capsule by mouth Daily. 30 capsule 0    • oxybutynin XL (DITROPAN-XL) 5 MG 24 hr tablet TAKE 1 TABLET BY MOUTH DAILY (Patient not taking: Reported on 3/17/2021) 90 tablet 1 Not Taking at Unknown time   • polyethylene glycol (MIRALAX) packet Take 17 g by mouth Daily.      • Potassium Gluconate 550 MG tablet Take 550 mg by mouth Daily. (Patient not taking: Reported on 3/17/2021)   Not Taking at Unknown time   • QUEtiapine (SEROQUEL) 50 MG tablet Take 1 tablet by mouth Every Night. (Patient not taking: Reported on 3/17/2021) 30 tablet 2 Not Taking at Unknown time   • sucralfate (Carafate) 1 g tablet Take 1 tablet by mouth 4 (Four) Times a Day. (Patient not taking: Reported on 3/17/2021) 120  tablet 0 Not Taking at Unknown time   • thiamine (VITAMIN B-1) 100 MG tablet Take 100 mg by mouth Daily. (Patient not taking: Reported on 3/17/2021)   Not Taking at Unknown time   • traZODone (DESYREL) 150 MG tablet TAKE 1 AND 1/2 TABLETS BY MOUTH EVERY NIGHT 135 tablet 5    • triamcinolone (KENALOG) 0.1 % cream Apply  topically 2 (Two) Times a Day. (Patient not taking: Reported on 3/17/2021) 45 g 0 Not Taking at Unknown time   • zolpidem (AMBIEN) 5 MG tablet Take 1 tablet by mouth At Night As Needed for Sleep. (Patient not taking: Reported on 3/17/2021) 5 tablet 0 Not Taking at Unknown time     Allergies:  Patient has no known allergies.    Objective    Vital Signs  Temp:  [98.3 °F (36.8 °C)] 98.3 °F (36.8 °C)  Heart Rate:  [76-81] 76  Resp:  [18] 18  BP: (147-158)/(65-75) 147/65    Physical Exam:  General Appearance: Well-developed with a slight malnutrition  Head: Atraumatic, normocephalic  Neck: Supple, no JVD  Lungs: Unlabored with equal bilateral excursion.  On room ai  Heart: Regular rate and rhythm  Abdomen: Soft and nontender  Extremities: Warm, no significant edema  Pulses: Dopplerable pulses in the right foot.  Not palpable in the right foot.  Comparably weaker but dopplerable pulses in the left foot.  Palpable peripheral pulses in the upper extremities  Skin: Warm, dry.  Blanchable erythema to the left great toe with a small circumferential blackish blister on the left fifth toe  Neurologic: Moves all extremities.  No focal deficit.  Alert and oriented x3          Data Review:      Imaging Results (Last 72 Hours)     ** No results found for the last 72 hours. **            Assessment:    Ischemic left lower extremity in the setting of likely peripheral arterial disease.    Patient Active Problem List   Diagnosis   • Chronic obstructive pulmonary disease (CMS/HCC)   • Depression   • Dizziness of unknown cause   • Fatigue   • Viral URI   • Constipation   • Arthritis of right hip   • Tobacco abuse   •  Status post total replacement of right hip   • Alcohol abuse, daily use   • Acute post-operative pain   • Acute blood loss anemia, mild, asymptomatic   • COPD exacerbation (CMS/HCC)   • Difficulty swallowing solids   • Melena   • Malignant neoplasm of left female breast (CMS/HCC)   • H/O traumatic subdural hematoma   • Age-related osteoporosis without current pathological fracture   • History of small bowel obstruction   • History of colon resection   • History of colon polyps   • Basal cell carcinoma (BCC) of cheek   • Frequent falls   • Major depressive disorder with single episode, in partial remission (CMS/HCC)   • Osteoporosis         * No active hospital problems. *        Plan: Evaluation for acute ischemic changes to the left lower extremity.  Stat BMP and CTA of abdominal aorta with runoff to lower extremities have been ordered and are waiting results.  Patient does have risk factors for acute embolus with smoking, history of cancer and sedentary activity levels.  We will continue her on IV heparin.  It appears this is likely acute on chronic in nature.  It appears symptoms probably began 3 to 4 days ago and was worsened today with increasing activity level and physical therapy.  As mentioned above it was reported that she did not have Doppler pulses at the outside hospital and she does now.  Heparin likely playing a positive role.  Pending acute findings on the CTA will likely continue IV heparin through the night with close monitoring.  I have spoken to Dr. Andrade and updated him.  He is standing by waiting to review CTA.  Final decision on emergency surgery will be made following his evaluation.  Consultation with hospital medicine for medical management and close monitoring of EtOH withdrawal.  ANGEL LUIS Espinoza  03/17/21  21:43 EDT

## 2021-03-18 NOTE — ANESTHESIA PROCEDURE NOTES
Airway  Urgency: elective    Date/Time: 3/18/2021 3:16 PM    General Information and Staff    Patient location during procedure: OR  CRNA: Amilcar Doll III, CRNA    Indications and Patient Condition  Indications for airway management: airway protection    Preoxygenated: yes  MILS not maintained throughout  Mask difficulty assessment: 0 - not attempted    Final Airway Details  Final airway type: supraglottic airway      Successful airway: I-gel  Size 3    Number of attempts at approach: 1  Assessment: lips, teeth, and gum same as pre-op    Additional Comments  Symmetric chest rise and fall with equal breath sounds bilaterally.

## 2021-03-18 NOTE — ANESTHESIA PREPROCEDURE EVALUATION
Anesthesia Evaluation     Patient summary reviewed and Nursing notes reviewed                Airway   Mallampati: II  TM distance: >3 FB  Neck ROM: full  No difficulty expected  Dental - normal exam     Pulmonary - normal exam   (+) a smoker Current, COPD,   Cardiovascular - negative cardio ROS and normal exam        Neuro/Psych- negative ROS  GI/Hepatic/Renal/Endo    (+)  GERD,      Musculoskeletal     Abdominal  - normal exam    Bowel sounds: normal.   Substance History   (+) alcohol use,      OB/GYN negative ob/gyn ROS         Other   arthritis,                      Anesthesia Plan    ASA 3     general and MAC       Anesthetic plan, all risks, benefits, and alternatives have been provided, discussed and informed consent has been obtained with: patient.    Plan discussed with CRNA.

## 2021-03-18 NOTE — ANESTHESIA POSTPROCEDURE EVALUATION
Patient: Shirley Calhoun    Procedure Summary     Date: 03/18/21 Room / Location: [facility] OR 02 / [facility] HYBRID SERENITY    Anesthesia Start: 1507 Anesthesia Stop: 1612    Procedure: AORTAGRAM WITHRUNOFFS (N/A ) Diagnosis:     Surgeons: Jarad Andrade MD Provider: Marquez Rankin MD    Anesthesia Type: general ASA Status: 3          Anesthesia Type: general    Vitals  Vitals Value Taken Time   /56 03/18/21 1607   Temp 97.5 °F (36.4 °C) 03/18/21 1607   Pulse 76 03/18/21 1611   Resp 18 03/18/21 1607   SpO2 99 % 03/18/21 1611   Vitals shown include unvalidated device data.        Post Anesthesia Care and Evaluation    Patient location during evaluation: PACU  Patient participation: complete - patient participated  Level of consciousness: awake and alert  Pain management: adequate  Airway patency: patent  Anesthetic complications: No anesthetic complications  PONV Status: none  Cardiovascular status: hemodynamically stable and acceptable  Respiratory status: nonlabored ventilation, acceptable and nasal cannula  Hydration status: acceptable

## 2021-03-18 NOTE — NURSING NOTE
WOC consult for left 5th toe and right lateral foot assessment:     Left lateral 5th toe:   Presents with a small intact dried blood blister.   Likely from friction.   Area is not pressure.   Just leave as is.   Wound care is not needed for this.     Right lateral foot:   Presents with an dried callused area.   Possible chronic friction?  Area debrided; intact skin revealed underneath.   Applied silicone based moisturizer.   Please apply BID.   See skin care order.     Heels intact and blanching.   No other skin/wound issues.     Nothing further needed from WOC.   Will sing off.     Thanks

## 2021-03-19 ENCOUNTER — TELEPHONE (OUTPATIENT)
Dept: CARDIAC SURGERY | Facility: CLINIC | Age: 81
End: 2021-03-19

## 2021-03-19 ENCOUNTER — TELEPHONE (OUTPATIENT)
Dept: FAMILY MEDICINE CLINIC | Facility: CLINIC | Age: 81
End: 2021-03-19

## 2021-03-19 ENCOUNTER — READMISSION MANAGEMENT (OUTPATIENT)
Dept: CALL CENTER | Facility: HOSPITAL | Age: 81
End: 2021-03-19

## 2021-03-19 VITALS
OXYGEN SATURATION: 95 % | TEMPERATURE: 98 F | RESPIRATION RATE: 18 BRPM | WEIGHT: 93.2 LBS | SYSTOLIC BLOOD PRESSURE: 69 MMHG | BODY MASS INDEX: 18.3 KG/M2 | HEART RATE: 75 BPM | DIASTOLIC BLOOD PRESSURE: 50 MMHG | HEIGHT: 60 IN

## 2021-03-19 LAB
ANION GAP SERPL CALCULATED.3IONS-SCNC: 5 MMOL/L (ref 5–15)
BASOPHILS # BLD AUTO: 0.02 10*3/MM3 (ref 0–0.2)
BASOPHILS NFR BLD AUTO: 0.3 % (ref 0–1.5)
BUN SERPL-MCNC: 12 MG/DL (ref 8–23)
BUN/CREAT SERPL: 19.7 (ref 7–25)
CALCIUM SPEC-SCNC: 8.8 MG/DL (ref 8.6–10.5)
CHLORIDE SERPL-SCNC: 103 MMOL/L (ref 98–107)
CO2 SERPL-SCNC: 28 MMOL/L (ref 22–29)
CREAT SERPL-MCNC: 0.61 MG/DL (ref 0.57–1)
DEPRECATED RDW RBC AUTO: 50.9 FL (ref 37–54)
EOSINOPHIL # BLD AUTO: 0.05 10*3/MM3 (ref 0–0.4)
EOSINOPHIL NFR BLD AUTO: 0.7 % (ref 0.3–6.2)
ERYTHROCYTE [DISTWIDTH] IN BLOOD BY AUTOMATED COUNT: 12.3 % (ref 12.3–15.4)
GFR SERPL CREATININE-BSD FRML MDRD: 94 ML/MIN/1.73
GLUCOSE SERPL-MCNC: 117 MG/DL (ref 65–99)
HCT VFR BLD AUTO: 33.9 % (ref 34–46.6)
HGB BLD-MCNC: 11.1 G/DL (ref 12–15.9)
IMM GRANULOCYTES # BLD AUTO: 0.01 10*3/MM3 (ref 0–0.05)
IMM GRANULOCYTES NFR BLD AUTO: 0.1 % (ref 0–0.5)
LYMPHOCYTES # BLD AUTO: 0.57 10*3/MM3 (ref 0.7–3.1)
LYMPHOCYTES NFR BLD AUTO: 7.8 % (ref 19.6–45.3)
MACROCYTES BLD QL SMEAR: NORMAL
MCH RBC QN AUTO: 36.8 PG (ref 26.6–33)
MCHC RBC AUTO-ENTMCNC: 32.7 G/DL (ref 31.5–35.7)
MCV RBC AUTO: 112.3 FL (ref 79–97)
MONOCYTES # BLD AUTO: 1.07 10*3/MM3 (ref 0.1–0.9)
MONOCYTES NFR BLD AUTO: 14.6 % (ref 5–12)
NEUTROPHILS NFR BLD AUTO: 5.61 10*3/MM3 (ref 1.7–7)
NEUTROPHILS NFR BLD AUTO: 76.5 % (ref 42.7–76)
NRBC BLD AUTO-RTO: 0 /100 WBC (ref 0–0.2)
PLAT MORPH BLD: NORMAL
PLATELET # BLD AUTO: 278 10*3/MM3 (ref 140–450)
PMV BLD AUTO: 9.7 FL (ref 6–12)
POTASSIUM SERPL-SCNC: 4.3 MMOL/L (ref 3.5–5.2)
RBC # BLD AUTO: 3.02 10*6/MM3 (ref 3.77–5.28)
SODIUM SERPL-SCNC: 136 MMOL/L (ref 136–145)
WBC # BLD AUTO: 7.33 10*3/MM3 (ref 3.4–10.8)
WBC MORPH BLD: NORMAL

## 2021-03-19 PROCEDURE — 85007 BL SMEAR W/DIFF WBC COUNT: CPT | Performed by: PHYSICIAN ASSISTANT

## 2021-03-19 PROCEDURE — 80048 BASIC METABOLIC PNL TOTAL CA: CPT | Performed by: PHYSICIAN ASSISTANT

## 2021-03-19 PROCEDURE — 85025 COMPLETE CBC W/AUTO DIFF WBC: CPT | Performed by: PHYSICIAN ASSISTANT

## 2021-03-19 PROCEDURE — 94799 UNLISTED PULMONARY SVC/PX: CPT

## 2021-03-19 RX ORDER — ASPIRIN 81 MG/1
81 TABLET ORAL DAILY
Status: DISCONTINUED | OUTPATIENT
Start: 2021-03-19 | End: 2021-03-19 | Stop reason: HOSPADM

## 2021-03-19 RX ORDER — ASPIRIN 81 MG/1
81 TABLET ORAL DAILY
Qty: 30 TABLET | Refills: 6 | Status: SHIPPED | OUTPATIENT
Start: 2021-03-19

## 2021-03-19 RX ORDER — CLOPIDOGREL BISULFATE 75 MG/1
75 TABLET ORAL DAILY
Qty: 30 TABLET | Refills: 6 | Status: SHIPPED | OUTPATIENT
Start: 2021-03-19 | End: 2021-10-05

## 2021-03-19 RX ORDER — CLOPIDOGREL BISULFATE 75 MG/1
75 TABLET ORAL DAILY
Status: DISCONTINUED | OUTPATIENT
Start: 2021-03-19 | End: 2021-03-19 | Stop reason: HOSPADM

## 2021-03-19 RX ADMIN — BUDESONIDE 0.5 MG: 0.5 INHALANT RESPIRATORY (INHALATION) at 09:58

## 2021-03-19 RX ADMIN — ARFORMOTEROL TARTRATE 15 MCG: 15 SOLUTION RESPIRATORY (INHALATION) at 09:58

## 2021-03-19 RX ADMIN — PANTOPRAZOLE SODIUM 40 MG: 40 TABLET, DELAYED RELEASE ORAL at 06:34

## 2021-03-19 RX ADMIN — DULOXETINE HYDROCHLORIDE 60 MG: 60 CAPSULE, DELAYED RELEASE ORAL at 08:48

## 2021-03-19 RX ADMIN — OXYBUTYNIN CHLORIDE 5 MG: 5 TABLET, EXTENDED RELEASE ORAL at 08:48

## 2021-03-19 RX ADMIN — THIAMINE HCL TAB 100 MG 100 MG: 100 TAB at 08:48

## 2021-03-19 RX ADMIN — Medication 1 PATCH: at 08:48

## 2021-03-19 RX ADMIN — IPRATROPIUM BROMIDE 0.5 MG: 0.5 SOLUTION RESPIRATORY (INHALATION) at 09:58

## 2021-03-19 RX ADMIN — CLOPIDOGREL BISULFATE 75 MG: 75 TABLET ORAL at 08:48

## 2021-03-19 RX ADMIN — MULTIVITAMIN TABLET 1 TABLET: TABLET at 08:48

## 2021-03-19 RX ADMIN — ASPIRIN 81 MG: 81 TABLET, COATED ORAL at 08:48

## 2021-03-19 RX ADMIN — CETIRIZINE HYDROCHLORIDE 10 MG: 10 TABLET, FILM COATED ORAL at 08:48

## 2021-03-19 RX ADMIN — FOLIC ACID 1 MG: 1 TABLET ORAL at 08:48

## 2021-03-19 NOTE — TELEPHONE ENCOUNTER
Caller: Psychiatric     Relationship to patient: Hospital     Best call back number: [phone]    Chief complaint: Hospital Follow Up     Type of visit: Hospital Follow Up     Requested date: Day of or before 03/26/2021     If rescheduling, when is the original appointment: N/A    Additional notes:Due to availability the patient was unable to be scheduled at this time. Maria Elena requested that the office  Reach out to the patient directly.

## 2021-03-19 NOTE — OUTREACH NOTE
Prep Survey      Responses   [facility] patient discharged from?  [place]   Is LACE score < 7 ?  No   Emergency Room discharge w/ pulse ox?  No   Eligibility  [facility]   Date of Admission  03/17/21   Date of Discharge  03/19/21   Discharge Disposition  Home or Self Care   Discharge diagnosis  Left foot pain and discolorationRight common femoral arterial catheter placement   Does the patient have one of the following disease processes/diagnoses(primary or secondary)?  General Surgery   Does the patient have Home health ordered?  No   Is there a DME ordered?  No   Prep survey completed?  Yes          Jumana Osei RN

## 2021-03-19 NOTE — PROGRESS NOTES
Discharge Planning Assessment  [facility]     Patient Name: Shirley Calhoun  MRN: [id number]  Today's Date: 3/19/2021    Admit Date: 3/17/2021    Discharge Needs Assessment     Row Name 03/19/21 1153       Living Environment    Lives With  alone    Current Living Arrangements  home/apartment/condo Lives in [facility]    Primary Care Provided by  self    Provides Primary Care For  no one    Family Caregiver if Needed  none    Quality of Family Relationships  unable to assess    Able to Return to Prior Arrangements  yes       Resource/Environmental Concerns    Resource/Environmental Concerns  none       Transition Planning    Patient/Family Anticipates Transition to  home    Patient/Family Anticipated Services at Transition      Transportation Anticipated  other (see comments)       Discharge Needs Assessment    Concerns to be Addressed  denies needs/concerns at this time;other (see comments) transportation    Current Discharge Risk  lives alone        Discharge Plan     Row Name 03/19/21 1155       Plan    Plan  Home    Patient/Family in Agreement with Plan  yes    Plan Comments  Met with pt at . She is independent and lives alone in [facility]. No DME or HH. She will need transport to [facility] to  her car since she drove herself there. Pt has no family to transport. Arranged a lyft for pt for d/c. CM will follow. Ingrid ext. [phone]    Final Discharge Disposition Code  01 - home or self-care        Continued Care and Services - Discharged on 3/19/2021 Admission date: 3/17/2021 - Discharge disposition: Home or Self Care   Coordination has not been started for this encounter.       Expected Discharge Date and Time     Expected Discharge Date Expected Discharge Time    Mar 19, 2021         Demographic Summary     Row Name 03/19/21 1152       General Information    Referral Source  admission list    Preferred Language  English     Used During This Interaction  no    General  Information Comments  PCP is DENISSE ARMENTA       Contact Information    Permission Granted to Share Info With          Functional Status     Row Name 03/19/21 1152       Functional Status    Usual Activity Tolerance  good    Current Activity Tolerance  good       Functional Status, IADL    Medications  independent    Meal Preparation  independent    Housekeeping  independent    Laundry  independent    Shopping  independent    IADL Comments  --       Employment/    Employment/ Comments  Has Medicare A[employer]B and [place] BC supplement        Psychosocial    No documentation.       Abuse/Neglect    No documentation.       Legal    No documentation.       Substance Abuse    No documentation.       Patient Forms    No documentation.           Ingrid Sanchez RN

## 2021-03-19 NOTE — TELEPHONE ENCOUNTER
Patient's daughter, Lesly Vega, called asking if Dr. Andrade could call her in regards to the patient's most recent hospitalization and surgery. I told her that I would send him a message and ask him to give her a call when he has time. However, when I checked the patient's chart, there is not a BH verbal release form signed saying that we can speak with Lesly. I also spoke with Dr. Andrade and he stated that the patient asked him not to speak with anyone about her care. He said that if the patient wants us to speak with Lesly about her care, that she will have to call us and let us know and sign a verbal release form.

## 2021-03-22 ENCOUNTER — TRANSITIONAL CARE MANAGEMENT TELEPHONE ENCOUNTER (OUTPATIENT)
Dept: CALL CENTER | Facility: HOSPITAL | Age: 81
End: 2021-03-22

## 2021-03-22 LAB
BH BB BLOOD EXPIRATION DATE: NORMAL
BH BB BLOOD TYPE BARCODE: 7300
BH BB DISPENSE STATUS: NORMAL
BH BB PRODUCT CODE: NORMAL
BH BB UNIT NUMBER: NORMAL
CROSSMATCH INTERPRETATION: NORMAL
UNIT  ABO: NORMAL
UNIT  RH: NORMAL

## 2021-03-22 NOTE — OUTREACH NOTE
Call Center TCM Note      Responses   [facility] patient discharged from?  [place]   Does the patient have one of the following disease processes/diagnoses(primary or secondary)?  General Surgery   TCM attempt successful?  No   Unsuccessful attempts  Attempt 1          Hyun Warren LPN    3/22/2021, 08:30 EDT

## 2021-03-22 NOTE — OUTREACH NOTE
Call Center TCM Note      Responses   [facility] patient discharged from?  [place]   Does the patient have one of the following disease processes/diagnoses(primary or secondary)?  General Surgery   TCM attempt successful?  No [verbal release is over a year old]   Unsuccessful attempts  Attempt 2          Kailyn Hill RN    3/22/2021, 16:22 EDT

## 2021-03-23 ENCOUNTER — TRANSITIONAL CARE MANAGEMENT TELEPHONE ENCOUNTER (OUTPATIENT)
Dept: CALL CENTER | Facility: HOSPITAL | Age: 81
End: 2021-03-23

## 2021-03-23 NOTE — OUTREACH NOTE
Call Center TCM Note      Responses   [facility] patient discharged from?  [place]   Does the patient have one of the following disease processes/diagnoses(primary or secondary)?  General Surgery   TCM attempt successful?  No   Unsuccessful attempts  Attempt 3   Wrap up additional comments  Unable to reach pt x 3 attempts for TCM call. Pt is not yet sched for TCM FWP with PCP Lara ROTHMAN. Pt is s/p Aortogram and does have fwp with Cardio 04/15/2021          Clari Chau MA    3/23/2021, 15:54 EDT

## 2021-03-25 NOTE — DISCHARGE SUMMARY
CTS Discharge Summary    Patient Care Team:  Lara Kline APRN as PCP - General (Family Medicine)  Consults:   Consults     Date and Time Order Name Status Description    3/17/2021  9:52 PM Inpatient Hospitalist Consult Completed           Date of Admission: 3/17/2021  8:34 PM  Date of Discharge:  3/19/2021    Discharge Diagnosis  Past Medical History:   Diagnosis Date   • Anxiety    • Arthritis    • Bowel obstruction (CMS/HCC)    • COPD (chronic obstructive pulmonary disease) (CMS/HCC)    • Depression    • Fatigue    • GERD (gastroesophageal reflux disease)    • Hip pain    • Hx of colonic polyps    • Malignant neoplasm of breast, left    • Wears dentures    • Wears eyeglasses      Ischemic rest pain of lower extremity [M79.606, I99.8]     Procedures Performed  Procedure(s):  AORTAGRAM WITH RUNOFFS       History of Present Illness  Patient is a 81 y.o. female with a history of skin cancer, right breast cancer, longstanding current daily EtOH use, tobacco use and  COPD.  Patient states for the last 3 to 4 days she has had pain and discomfort in her left lower extremity below the knee.  She states that she has compression of the disc in her back and regularly sees physical therapy.  With Covid her visits have been minimal recently.  Admittedly her activity levels have been decreased significantly in the recent past.  With the discomfort in her leg and ankle, today she noted increased redness discoloration.  She did get out today and go to physical therapy.  She states that the therapist did not like the looks of her leg and encouraged her to seek medical attention.  She was seen and evaluated at [facility] where it was reported that the patient had no palpable or dopplerable pulses in her left lower extremity.  Reportedly ultrasound was performed which showed inflow disease but no blockage.  She was started on IV heparin.  With concern for an acute ischemic changes to her lower extremity Dr. Andrade was  consulted via telephone and asked to accept the patient in evaluation.  Patient presents here tonight in her usual state of health with noted slight improvement in her left lower extremity.  She is continued on IV heparin here.  On evaluation the patient denies any significant pain.  Noted to be a slight erythematous changes to her left great toe with a small circular blackish blister noted on the left fifth toe.  Weak but dopplerable pulses were noted on the anterior tibial, dorsalis pedis and posterior tibial arteries.  She denies any recent fever shakes or chills.  Denies chest pain shortness of breath.  Recently did have some abdominal pain and was treated with Carafate and omeprazole.  Recently treated for a urinary tract infection.  Patient is a current everyday smoker.  She admits to daily drinking.  States she will have 3-4 large vodka drinks daily.      Hospital Course  Patient presented to operating room on 3/18/2021 for Right common femoral arterial catheter placement and Attempted aortogram.   We do not feel that she would tolerate a open operation for this point the catheter was removed and pressure held and she was awakened taken to PACU in satisfactory condition.  Patient was monitored overnight and met criteria for discharge and was discharged to home on 3/19/2021.       Discharge Medications     Discharge Medications      New Medications      Instructions Start Date   aspirin 81 MG EC tablet   81 mg, Oral, Daily      clopidogrel 75 MG tablet  Commonly known as: PLAVIX   75 mg, Oral, Daily         Continue These Medications      Instructions Start Date   AIRBORNE PO   Take  by mouth.      albuterol sulfate  (90 Base) MCG/ACT inhaler  Commonly known as: PROVENTIL HFA;VENTOLIN HFA;PROAIR HFA   INHALE 2 PUFFS BY MOUTH EVERY 4 HOURS AS NEEDED FOR WHEEZING      Calcium 600-400 MG-UNIT chewable tablet   1 tablet, 2 Times Daily PRN      Claritin 10 MG tablet  Generic drug: loratadine   10 mg, Oral,  Daily      docusate sodium 100 MG capsule   100 mg, Oral, 2 Times Daily      DULoxetine 60 MG capsule  Commonly known as: CYMBALTA   TAKE 1 CAPSULE BY MOUTH EVERY DAY      Fluticasone-Umeclidin-Vilant 100-62.5-25 MCG/INH aerosol powder    1 puff, Inhalation, Daily      meclizine 25 MG tablet  Commonly known as: ANTIVERT   TAKE 1 TABLET BY MOUTH THREE TIMES DAILY AS NEEDED FOR DIZZINESS      MILK THISTLE PO   Oral      omeprazole 40 MG capsule  Commonly known as: priLOSEC   40 mg, Oral, Daily      oxybutynin XL 5 MG 24 hr tablet  Commonly known as: DITROPAN-XL   5 mg, Oral, Daily      polyethylene glycol packet  Commonly known as: MIRALAX   17 g, Oral, Daily      Potassium Gluconate 550 MG tablet   550 mg, Daily      QUEtiapine 50 MG tablet  Commonly known as: SEROquel   50 mg, Oral, Nightly      sucralfate 1 g tablet  Commonly known as: Carafate   1 g, Oral, 4 Times Daily      thiamine 100 MG tablet  Commonly known as: VITAMIN B-1   100 mg, Daily      traZODone 150 MG tablet  Commonly known as: DESYREL   TAKE 1 AND 1/2 TABLETS BY MOUTH EVERY NIGHT      triamcinolone 0.1 % cream  Commonly known as: KENALOG   Topical, 2 Times Daily      VITAMIN B 12 PO   Oral      zolpidem 5 MG tablet  Commonly known as: Ambien   5 mg, Oral, Nightly PRN             Discharge Diet:   Diet Instructions     Diet: Cardiac      Discharge Diet: Cardiac          Activity at Discharge:   Activity Instructions     Bathing Restrictions      Type of Restriction: Bathing    Bathing Restrictions: No Tub Bath    Lifting Restrictions      Type of Restriction: Lifting    Lifting Restrictions: Lifting Restriction (Indicate Limit)    Weight Limit (Pounds): 10    Length of Lifting Restriction: 1 week          Follow-up Appointments  Future Appointments   Date Time Provider Department Center   4/15/2021 11:45 AM Jarad Andrade MD [facility] None        WOUND CARE:  Monitor surgical wounds daily. Keep incisons clean and dry.   Call CT Surgery office  [phone]  with any questions or concerns, specifically let them know of increased redness, drainage, or opening up of incision.     Joya Bashir PA-C  03/25/21  11:12 EDT

## 2021-03-30 ENCOUNTER — READMISSION MANAGEMENT (OUTPATIENT)
Dept: CALL CENTER | Facility: HOSPITAL | Age: 81
End: 2021-03-30

## 2021-03-30 NOTE — OUTREACH NOTE
General Surgery Week 2 Survey      Responses   [facility] patient discharged from?  [place]   Does the patient have one of the following disease processes/diagnoses(primary or secondary)?  General Surgery   Week 2 attempt successful?  Yes   Call start time  1221   Call end time  1227   Discharge diagnosis  Left foot pain and discolorationRight common femoral arterial catheter placement   Meds reviewed with patient/caregiver?  Yes   Is the patient having any side effects they believe may be caused by any medication additions or changes?  No   Does the patient have all medications related to this admission filled (includes all antibiotics, pain medications, etc.)  Yes   Is the patient taking all medications as directed (includes completed medication regime)?  Yes   Does the patient have a follow up appointment scheduled with their surgeon?  Yes   Has the patient kept scheduled appointments due by today?  N/A   Comments  Sees surgeon on 4/15/2021. Patient is anticipating next surgery to be in near future. She also has a followup with Lara Kline (PCP) on 4/1/2021   Has home health visited the patient within 72 hours of discharge?  N/A   Psychosocial issues?  No   Did the patient receive a copy of their discharge instructions?  Yes   Nursing interventions  Reviewed instructions with patient   What is the patient's perception of their health status since discharge?  Improving   Nursing interventions  Nurse provided patient education   Is the patient /caregiver able to teach back basic post-op care?  Take showers only when approved by MD-sponge bathe until then, No tub bath, swimming, or hot tub until instructed by MD, Keep incision areas clean,dry and protected   Is the patient/caregiver able to teach back signs and symptoms of incisional infection?  Increased redness, swelling or pain at the incisonal site, Increased drainage or bleeding, Incisional warmth, Pus or odor from incision, Fever   Is the patient/caregiver  able to teach back steps to recovery at home?  Set small, achievable goals for return to baseline health, Rest and rebuild strength, gradually increase activity, Make a list of questions for surgeon's appointment   If the patient is a current smoker, are they able to teach back resources for cessation?  Smoking cessation medications   Is the patient/caregiver able to teach back the hierarchy of who to call/visit for symptoms/problems? PCP, Specialist, Home health nurse, Urgent Care, ED, 911  Yes   Week 2 call completed?  Yes          Santiago Phillips RN

## 2021-04-01 ENCOUNTER — OFFICE VISIT (OUTPATIENT)
Dept: FAMILY MEDICINE CLINIC | Facility: CLINIC | Age: 81
End: 2021-04-01

## 2021-04-01 VITALS
SYSTOLIC BLOOD PRESSURE: 100 MMHG | HEART RATE: 92 BPM | TEMPERATURE: 98.2 F | OXYGEN SATURATION: 97 % | DIASTOLIC BLOOD PRESSURE: 60 MMHG | BODY MASS INDEX: 18.61 KG/M2 | HEIGHT: 60 IN | WEIGHT: 94.8 LBS | RESPIRATION RATE: 24 BRPM

## 2021-04-01 DIAGNOSIS — I99.8 ISCHEMIC REST PAIN OF LOWER EXTREMITY: Primary | ICD-10-CM

## 2021-04-01 DIAGNOSIS — M79.606 ISCHEMIC REST PAIN OF LOWER EXTREMITY: Primary | ICD-10-CM

## 2021-04-01 PROCEDURE — 99495 TRANSJ CARE MGMT MOD F2F 14D: CPT | Performed by: NURSE PRACTITIONER

## 2021-04-01 RX ORDER — TIOTROPIUM BROMIDE INHALATION SPRAY 3.12 UG/1
2 SPRAY, METERED RESPIRATORY (INHALATION) DAILY
Status: ON HOLD | COMMUNITY
Start: 2021-02-23 | End: 2021-05-28

## 2021-04-01 NOTE — PROGRESS NOTES
Transitional Care Follow Up Visit  Subjective     Shirley Calhoun is a 81 y.o. female who presents for a transitional care management visit.    Within 48 business hours after discharge our office contacted her via telephone to coordinate her care and needs.      I reviewed and discussed the details of that call along with the discharge summary, hospital problems, inpatient lab results, inpatient diagnostic studies, and consultation reports with Shirley.     Current outpatient and discharge medications have been reconciled for the patient.  Reviewed by: LORNA Dwyer      Date of TCM Phone Call 3/19/2021   [facility]   Date of Admission 3/17/2021   Date of Discharge 3/19/2021   Risk for Readmission (LACE Score) -   Discharge Disposition Home or Self Care     Risk for Readmission (LACE) Score: 9 (3/19/2021  6:01 AM)      History of Present Illness   Course During Hospital Stay:  F/U [facility] discharge   Left foot turned bright red while at PT; went to [facility] ER was found to have absent pulse in left lower extremity and was transferred to [facility]. 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 Extensive calcified plaque formation throughout the arterial structures. 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 Left common iliac artery and external artery are patent but have multiple areas of stenosis. Both superficial femoral arteries are occluded. On the right side popliteal artery appears patent and the left side popliteal artery appears to be occluded. Anterior tibial runoff into the foot is visible bilaterally. Posterior tibial artery has multiple stenoses and occlusions bilaterally. The peroneal arteries are visible bilaterally and extend down to the ankle.    Left foot and Ankle hurts; foot turns red at times, feels funny at other times. There was a slight erythematous  changes to her left great toe with a small circular blackish blister noted on the left fifth toe.  Continues to smoke and drink alcohol    Sees Dr Andrade vascular surgeon 4/15 at 11:45 am    She has informed her daughter and her close friend Augustus of her diagnoses. She is wanting to have the surgical procedures because she thinks the benefits outweigh the risks      The following portions of the patient's history were reviewed and updated as appropriate: allergies, current medications, past family history, past medical history, past social history, past surgical history and problem list.    Review of Systems   Constitutional: Positive for activity change (not to lift more than 10 lbs) and fatigue. Negative for chills, diaphoresis and fever.   Respiratory: Negative.  Negative for chest tightness and shortness of breath.    Cardiovascular: Negative.    Gastrointestinal: Negative.  Negative for abdominal pain, diarrhea and nausea.   Musculoskeletal: Positive for arthralgias.   Skin: Positive for color change.   Neurological: Negative for dizziness.   Psychiatric/Behavioral: Negative for sleep disturbance. The patient is not nervous/anxious.        Objective   Physical Exam  Vitals and nursing note reviewed.   Constitutional:       General: She is not in acute distress.     Appearance: Normal appearance. She is well-developed. She is not ill-appearing or toxic-appearing.   HENT:      Head: Normocephalic.      Right Ear: External ear normal.      Left Ear: External ear normal.      Nose: Nose normal.   Eyes:      General: Lids are normal.   Cardiovascular:      Rate and Rhythm: Normal rate and regular rhythm.      Pulses: Normal pulses.      Heart sounds: Normal heart sounds, S1 normal and S2 normal. No murmur heard.   No friction rub. No gallop.    Pulmonary:      Effort: Pulmonary effort is normal.      Breath sounds: Normal breath sounds.   Abdominal:      General: Bowel sounds are normal. There is no distension.       Palpations: Abdomen is soft.      Tenderness: There is no abdominal tenderness.   Musculoskeletal:      Cervical back: Neck supple.   Skin:     General: Skin is warm and dry.      Capillary Refill: Capillary refill takes 2 to 3 seconds.      Findings: Erythema present.      Comments:  a slight erythematous changes to her left great toe with a small circular blackish blister noted on the left fifth toe.     Neurological:      Mental Status: She is alert and oriented to person, place, and time.      Gait: Gait normal.   Psychiatric:         Speech: Speech normal.         Behavior: Behavior normal.         Thought Content: Thought content normal.         Judgment: Judgment normal.         Assessment/Plan   Diagnoses and all orders for this visit:    1. Ischemic rest pain of lower extremity (Primary)    continue current meds.  Keep appt with Dr Andrade 4/15. Pt agrees      Monitor surgical wounds daily. Keep incisons clean and dry.   Call CT Surgery office [phone]  with any questions or concerns, specifically let them know of increased redness, drainage, or opening up of incision.

## 2021-04-06 ENCOUNTER — READMISSION MANAGEMENT (OUTPATIENT)
Dept: CALL CENTER | Facility: HOSPITAL | Age: 81
End: 2021-04-06

## 2021-04-06 NOTE — OUTREACH NOTE
General Surgery Week 3 Survey      Responses   [facility] patient discharged from?  [place]   Does the patient have one of the following disease processes/diagnoses(primary or secondary)?  General Surgery   Week 3 attempt successful?  Yes   Call start time  1038   Call end time  1040   Discharge diagnosis  Left foot pain and discolorationRight common femoral arterial catheter placement   Meds reviewed with patient/caregiver?  Yes   Is the patient taking all medications as directed (includes completed medication regime)?  Yes   Has the patient kept scheduled appointments due by today?  N/A   What is the patient's perception of their health status since discharge?  Improving   Week 3 call completed?  Yes          Dia Mckeon RN

## 2021-04-15 ENCOUNTER — OFFICE VISIT (OUTPATIENT)
Dept: CARDIAC SURGERY | Facility: CLINIC | Age: 81
End: 2021-04-15

## 2021-04-15 VITALS
HEIGHT: 60 IN | BODY MASS INDEX: 18.06 KG/M2 | WEIGHT: 92 LBS | HEART RATE: 78 BPM | OXYGEN SATURATION: 98 % | TEMPERATURE: 97 F | DIASTOLIC BLOOD PRESSURE: 67 MMHG | SYSTOLIC BLOOD PRESSURE: 175 MMHG

## 2021-04-15 DIAGNOSIS — J44.1 COPD EXACERBATION (HCC): Primary | ICD-10-CM

## 2021-04-15 DIAGNOSIS — M79.606 ISCHEMIC REST PAIN OF LOWER EXTREMITY: ICD-10-CM

## 2021-04-15 DIAGNOSIS — F10.10 ALCOHOL ABUSE, DAILY USE: ICD-10-CM

## 2021-04-15 DIAGNOSIS — I99.8 ISCHEMIC REST PAIN OF LOWER EXTREMITY: ICD-10-CM

## 2021-04-15 PROCEDURE — 99213 OFFICE O/P EST LOW 20 MIN: CPT | Performed by: STUDENT IN AN ORGANIZED HEALTH CARE EDUCATION/TRAINING PROGRAM

## 2021-04-15 NOTE — PROGRESS NOTES
Date: 04/15/2021   Patient Name: Shirley Calhoun  Address: [address] [place] [address] [address]  [facility] [address]  : 1940   Phone #: [unfilled]   MRN: [id number]     Referring Physician: No ref. provider found    Chief Complaint:    Chief Complaint   Patient presents with   • Follow-up     Hospital follow up s/p AA with run off.   • Peripheral Vascular Disease       History of Present Illness: Shirley Calhoun is a 81 y.o. female who is here today for follow up for an attempted an angiogram.  Her left iliofemoral system was nearly impassable there is a subintimal dissection that did not compromise flow to her native aorta.  Her CT angiogram demonstrates severe infra renal atherosclerosis, and occluded right iliac and occluded left and right superficial femoral arteries.  She is overall doing well though she does complain of generalized tiredness    Review of Systems:   Review of Systems   Constitutional: Positive for malaise/fatigue. Negative for chills, fever, night sweats and weight loss.   HENT: Negative.  Negative for hearing loss, odynophagia and sore throat.    Cardiovascular: Positive for dyspnea on exertion. Negative for chest pain, leg swelling, orthopnea and palpitations.   Respiratory: Negative.  Negative for cough and hemoptysis.    Endocrine: Negative for cold intolerance, heat intolerance, polydipsia, polyphagia and polyuria.   Hematologic/Lymphatic: Bruises/bleeds easily.   Skin: Negative.  Negative for itching and rash.   Musculoskeletal: Positive for arthritis, falls and joint pain. Negative for joint swelling and myalgias.   Gastrointestinal: Positive for abdominal pain, constipation and dysphagia. Negative for diarrhea, hematemesis, hematochezia, melena, nausea and vomiting.   Genitourinary: Negative for dysuria, frequency and hematuria.   Neurological: Positive for dizziness, light-headedness, loss of balance, vertigo and weakness. Negative for focal weakness, headaches, numbness and seizures.  "  Psychiatric/Behavioral: Positive for depression. Negative for suicidal ideas. The patient has insomnia.    All other systems reviewed and are negative.       I have reviewed and the following portions of the patient's history were updated as appropriate: past family history, past medical history, past social history, past surgical history and problem list.    Medications:     Current Outpatient Medications:   •  albuterol sulfate  (90 Base) MCG/ACT inhaler, INHALE 2 PUFFS BY MOUTH EVERY 4 HOURS AS NEEDED FOR WHEEZING, Disp: 18 g, Rfl: 0  •  aspirin 81 MG EC tablet, Take 1 tablet by mouth Daily., Disp: 30 tablet, Rfl: 6  •  clopidogrel (PLAVIX) 75 MG tablet, Take 1 tablet by mouth Daily., Disp: 30 tablet, Rfl: 6  •  Cyanocobalamin (VITAMIN B 12 PO), Take  by mouth., Disp: , Rfl:   •  DULoxetine (CYMBALTA) 60 MG capsule, TAKE 1 CAPSULE BY MOUTH EVERY DAY, Disp: 90 capsule, Rfl: 1  •  Fluticasone-Umeclidin-Vilant 100-62.5-25 MCG/INH aerosol powder , Inhale 1 puff Daily., Disp: 60 each, Rfl: 5  •  loratadine (CLARITIN) 10 MG tablet, Take 10 mg by mouth Daily., Disp: , Rfl:   •  MILK THISTLE PO, Take  by mouth., Disp: , Rfl:   •  omeprazole (priLOSEC) 40 MG capsule, Take 1 capsule by mouth Daily., Disp: 30 capsule, Rfl: 0  •  polyethylene glycol (MIRALAX) packet, Take 17 g by mouth Daily., Disp: , Rfl:   •  Spiriva Respimat 2.5 MCG/ACT aerosol solution inhaler, Inhale 2 puffs Daily., Disp: , Rfl:   •  sucralfate (Carafate) 1 g tablet, Take 1 tablet by mouth 4 (Four) Times a Day., Disp: 120 tablet, Rfl: 0  •  traZODone (DESYREL) 150 MG tablet, TAKE 1 AND 1/2 TABLETS BY MOUTH EVERY NIGHT, Disp: 135 tablet, Rfl: 5    Allergies:   No Known Allergies    Physical Exam:  Vital Signs:   Vitals:    04/15/21 1132   BP: 175/67   BP Location: Right arm   Patient Position: Sitting   Pulse: 78   Temp: 97 °F (36.1 °C)   SpO2: 98%   Weight: 41.7 kg (92 lb)   Height: 152.4 cm (60\")     Body mass index is 17.97 kg/m². "     Physical Exam   Wound: Well-healed    Results:   I have reviewed all pertinent radiographic studies, laboratory results and/or pathology as available.   No radiology results for the last 7 days     Assessment/Plan:   Diagnoses and all orders for this visit:    1. COPD exacerbation (CMS/HCC) (Primary)    2. Alcohol abuse, daily use    3. Ischemic rest pain of lower extremity    I do not feel with her age and comorbidities at age 81 that she would tolerate revascularization.  Either via an aortobifemoral bypass or a axillobifemoral bypass her issues would be outflow as both of her superficial femoral arteries are occluded.  I do not feel that she would tolerate bilateral femoropopliteal as well as the inflow disease all in one setting.  Staging it I worry that she would become debilitated and/or close off the limbs of any new graft reconstruction.  Given that she does not have gangrene or severe claudication I have advised her that it may be prudent to do nothing and continue medical management.  Especially since she drinks and has no intention of quitting smoking I feel that this would be too high risk with poor outcome.    Also referred her to cardiology in [place], Dr. Jernigan, to see if her generalized dyspnea and tiredness could be explained from a cardiac perspective    Follow Up:   Return if symptoms worsen or fail to improve.    The ROS, past medical history, surgical history, family history, social history and vitals were reviewed by myself and corrected as needed.      Jarad Andrade MD  [facility] Cardiothoracic Surgery   Electronically signed by Jarad Andrade MD, 04/15/21, 11:35 AM EDT.

## 2021-04-16 ENCOUNTER — READMISSION MANAGEMENT (OUTPATIENT)
Dept: CALL CENTER | Facility: HOSPITAL | Age: 81
End: 2021-04-16

## 2021-04-16 NOTE — OUTREACH NOTE
General Surgery Week 4 Survey      Responses   [facility] patient discharged from?  [facility]   Does the patient have one of the following disease processes/diagnoses(primary or secondary)?  General Surgery   Week 4 attempt successful?  Yes   Call start time  1113   Call end time  1116   Is the patient taking all medications as directed (includes completed medication regime)?  Yes   Medication comments  blood thinner to be started today   Has the patient kept scheduled appointments due by today?  Yes   Is the patient still receiving Home Health Services?  No   Comments  uses cane or walker   What is the patient's perception of their health status since discharge?  Improving   Week 4 call completed?  Yes   Would the patient like one additional call?  No   Graduated  Yes   Is the patient interested in additional calls from an ambulatory ?  NOTE:  applies to high risk patients requiring additional follow-up.  No   Did the patient feel the follow up calls were helpful during their recovery period?  Yes   Was the number of calls appropriate?  Yes   Wrap up additional comments  She says is doing well,no new issues will start blood thinner today as ordered by surgeon yesterday and will call us if needed.           Emily Tran RN

## 2021-04-19 RX ORDER — CILOSTAZOL 50 MG/1
50 TABLET ORAL 2 TIMES DAILY
Qty: 60 TABLET | Refills: 0 | Status: SHIPPED | OUTPATIENT
Start: 2021-04-19 | End: 2021-05-21

## 2021-04-21 ENCOUNTER — OFFICE VISIT (OUTPATIENT)
Dept: FAMILY MEDICINE CLINIC | Facility: CLINIC | Age: 81
End: 2021-04-21

## 2021-04-21 VITALS
BODY MASS INDEX: 17.24 KG/M2 | DIASTOLIC BLOOD PRESSURE: 70 MMHG | RESPIRATION RATE: 20 BRPM | TEMPERATURE: 98.7 F | HEART RATE: 78 BPM | OXYGEN SATURATION: 98 % | WEIGHT: 87.8 LBS | SYSTOLIC BLOOD PRESSURE: 140 MMHG | HEIGHT: 60 IN

## 2021-04-21 DIAGNOSIS — Z00.00 ENCOUNTER FOR MEDICARE ANNUAL WELLNESS EXAM: Primary | ICD-10-CM

## 2021-04-21 DIAGNOSIS — F51.01 PRIMARY INSOMNIA: ICD-10-CM

## 2021-04-21 DIAGNOSIS — I99.8 ISCHEMIC REST PAIN OF LOWER EXTREMITY: ICD-10-CM

## 2021-04-21 DIAGNOSIS — R30.0 DYSURIA: ICD-10-CM

## 2021-04-21 DIAGNOSIS — M79.606 ISCHEMIC REST PAIN OF LOWER EXTREMITY: ICD-10-CM

## 2021-04-21 LAB
BILIRUB BLD-MCNC: NEGATIVE MG/DL
CLARITY, POC: CLEAR
COLOR UR: YELLOW
GLUCOSE UR STRIP-MCNC: NEGATIVE MG/DL
KETONES UR QL: ABNORMAL
LEUKOCYTE EST, POC: NEGATIVE
NITRITE UR-MCNC: NEGATIVE MG/ML
PH UR: 6 [PH] (ref 5–8)
PROT UR STRIP-MCNC: ABNORMAL MG/DL
RBC # UR STRIP: NEGATIVE /UL
SP GR UR: 1.03 (ref 1–1.03)
UROBILINOGEN UR QL: NORMAL

## 2021-04-21 PROCEDURE — 81003 URINALYSIS AUTO W/O SCOPE: CPT | Performed by: NURSE PRACTITIONER

## 2021-04-21 PROCEDURE — G0439 PPPS, SUBSEQ VISIT: HCPCS | Performed by: NURSE PRACTITIONER

## 2021-04-21 RX ORDER — AMITRIPTYLINE HYDROCHLORIDE 10 MG/1
10 TABLET, FILM COATED ORAL NIGHTLY
Qty: 30 TABLET | Refills: 0 | Status: SHIPPED | OUTPATIENT
Start: 2021-04-21 | End: 2021-06-01

## 2021-04-22 PROBLEM — G89.18 ACUTE POST-OPERATIVE PAIN: Status: RESOLVED | Noted: 2017-05-02 | Resolved: 2021-04-22

## 2021-04-22 PROBLEM — Z96.641 STATUS POST TOTAL REPLACEMENT OF RIGHT HIP: Status: RESOLVED | Noted: 2017-05-02 | Resolved: 2021-04-22

## 2021-05-08 DIAGNOSIS — J43.1 PANLOBULAR EMPHYSEMA (HCC): ICD-10-CM

## 2021-05-11 RX ORDER — ALBUTEROL SULFATE 90 UG/1
AEROSOL, METERED RESPIRATORY (INHALATION)
Qty: 18 G | Refills: 0 | Status: SHIPPED | OUTPATIENT
Start: 2021-05-11 | End: 2022-01-01

## 2021-05-12 ENCOUNTER — OFFICE VISIT (OUTPATIENT)
Dept: CARDIOLOGY | Facility: CLINIC | Age: 81
End: 2021-05-12

## 2021-05-12 VITALS
DIASTOLIC BLOOD PRESSURE: 56 MMHG | OXYGEN SATURATION: 98 % | HEIGHT: 60 IN | HEART RATE: 105 BPM | SYSTOLIC BLOOD PRESSURE: 104 MMHG | BODY MASS INDEX: 17.28 KG/M2 | WEIGHT: 88 LBS

## 2021-05-12 DIAGNOSIS — R06.09 DYSPNEA ON EXERTION: Primary | ICD-10-CM

## 2021-05-12 DIAGNOSIS — I25.10 CORONARY ARTERY DISEASE INVOLVING NATIVE CORONARY ARTERY OF NATIVE HEART, ANGINA PRESENCE UNSPECIFIED: Primary | ICD-10-CM

## 2021-05-12 DIAGNOSIS — I73.9 PAD (PERIPHERAL ARTERY DISEASE) (HCC): ICD-10-CM

## 2021-05-12 DIAGNOSIS — I25.118 CORONARY ARTERY DISEASE INVOLVING NATIVE CORONARY ARTERY OF NATIVE HEART WITH OTHER FORM OF ANGINA PECTORIS (HCC): ICD-10-CM

## 2021-05-12 DIAGNOSIS — F10.10 ALCOHOL ABUSE, DAILY USE: ICD-10-CM

## 2021-05-12 DIAGNOSIS — Z72.0 TOBACCO ABUSE: ICD-10-CM

## 2021-05-12 DIAGNOSIS — R06.09 DYSPNEA ON EXERTION: ICD-10-CM

## 2021-05-12 PROCEDURE — 99204 OFFICE O/P NEW MOD 45 MIN: CPT | Performed by: INTERNAL MEDICINE

## 2021-05-12 PROCEDURE — 93000 ELECTROCARDIOGRAM COMPLETE: CPT | Performed by: INTERNAL MEDICINE

## 2021-05-12 RX ORDER — TRAZODONE HYDROCHLORIDE 150 MG/1
150 TABLET ORAL NIGHTLY
COMMUNITY
End: 2022-01-01 | Stop reason: SDUPTHER

## 2021-05-12 NOTE — PROGRESS NOTES
Subjective:     Encounter Date:05/12/2021    Primary Care Physician: Lara Kline APRN      Patient ID: Shirley Calhoun is a 81 y.o. female.    Chief Complaint:Follow-up (inoperable blood vein problem)    PROBLEM LIST:  1. Dyspnea on exertion  2. PAD  a. AA with runoff Dr. Andrade left iliofemoral system nearly impossible.  Subintimal dissection that did not compromise flow to the native aorta.  CTA demonstrated severe infrarenal atherosclerosis.  Occluded right iliac and occluded left and right SFA.  3. Dyslipidemia  4. Ongoing tobacco abuse  5. COPD  6. Breast cancer  a. Bilateral mastectomy  b. No chemo or XRT  7. Skin cancer with removal  8. GERD  9. Vertigo  10. Depression  11. History of subdural hematoma  a. Surgical intervention  12. Surgeries:  a. Lumbar back surgery  b. Right total hip replacement  c. Left knee surgery  d. Bilateral mastectomy   e. Cataract extraction    No Known Allergies      Current Outpatient Medications:   •  albuterol sulfate  (90 Base) MCG/ACT inhaler, INHALE 2 PUFFS BY MOUTH EVERY 4 HOURS AS NEEDED FOR WHEEZING, Disp: 18 g, Rfl: 0  •  amitriptyline (ELAVIL) 10 MG tablet, Take 1 tablet by mouth Every Night., Disp: 30 tablet, Rfl: 0  •  aspirin 81 MG EC tablet, Take 1 tablet by mouth Daily., Disp: 30 tablet, Rfl: 6  •  cilostazol (PLETAL) 50 MG tablet, Take 1 tablet by mouth 2 (Two) Times a Day for 30 days., Disp: 60 tablet, Rfl: 0  •  clopidogrel (PLAVIX) 75 MG tablet, Take 1 tablet by mouth Daily., Disp: 30 tablet, Rfl: 6  •  Cyanocobalamin (VITAMIN B 12 PO), Take  by mouth., Disp: , Rfl:   •  DULoxetine (CYMBALTA) 60 MG capsule, TAKE 1 CAPSULE BY MOUTH EVERY DAY, Disp: 90 capsule, Rfl: 1  •  Fluticasone-Umeclidin-Vilant 100-62.5-25 MCG/INH aerosol powder , Inhale 1 puff Daily., Disp: 60 each, Rfl: 5  •  loratadine (CLARITIN) 10 MG tablet, Take 10 mg by mouth Daily., Disp: , Rfl:   •  MILK THISTLE PO, Take  by mouth., Disp: , Rfl:   •  omeprazole (priLOSEC) 40 MG capsule, Take  "1 capsule by mouth Daily., Disp: 30 capsule, Rfl: 0  •  polyethylene glycol (MIRALAX) packet, Take 17 g by mouth Daily., Disp: , Rfl:   •  Spiriva Respimat 2.5 MCG/ACT aerosol solution inhaler, Inhale 2 puffs Daily., Disp: , Rfl:   •  sucralfate (Carafate) 1 g tablet, Take 1 tablet by mouth 4 (Four) Times a Day., Disp: 120 tablet, Rfl: 0  •  traZODone (DESYREL) 150 MG tablet, Take 150 mg by mouth Every Night., Disp: , Rfl:         History of Present Illness    Patient is an 81-year-old  female who is being seen today for further evaluation of dyspnea on exertion.  She has no previous history of obstructive coronary disease.  She was recently diagnosed with significant peripheral arterial disease.  She underwent further evaluation with Dr. Andrade.  At that time she was felt to not be a candidate for intervention and overall was high risk.  Patient endorsed fatigue and dyspnea and was referred here for further evaluation.  Patient complains of shortness of breath after roughly walking 200 yards.  Notes that she becomes severely short of breath.  Has associated fatigue.  She does endorse as well some shortness of breath at rest which is typically helped with her inhalers.  She has not had an ischemic evaluation in some time.  Patient refuses perfusion study as they are \"horrible\".  She walks at home with the assistance of a walker.  Does endorse recurrent vertigo symptoms.  Denies any edema.  Patient notes that her shortness of breath is significantly debilitating.  She feels like a \"invalid\".    The following portions of the patient's history were reviewed and updated as appropriate: allergies, current medications, past family history, past medical history, past social history, past surgical history and problem list.    Family History   Problem Relation Age of Onset   • No Known Problems Mother    • Alcohol abuse Father    • No Known Problems Sister    • No Known Problems Brother    • No Known Problems " "Daughter    • No Known Problems Son        Social History     Tobacco Use   • Smoking status: Current Every Day Smoker     Packs/day: 1.00     Years: 50.00     Pack years: 50.00     Types: Cigarettes   • Smokeless tobacco: Never Used   Substance Use Topics   • Alcohol use: Yes     Alcohol/week: 28.0 standard drinks     Types: 28 Shots of liquor per week     Comment: hx of alcohol abuse drinking vodka daily   • Drug use: No         Review of Systems   Constitutional: Positive for malaise/fatigue. Negative for fever.   HENT: Negative for nosebleeds.    Eyes: Negative for redness and visual disturbance.   Cardiovascular: Positive for dyspnea on exertion. Negative for orthopnea, palpitations and paroxysmal nocturnal dyspnea.   Respiratory: Positive for shortness of breath. Negative for cough, snoring, sputum production and wheezing.    Hematologic/Lymphatic: Negative for bleeding problem.   Skin: Negative for flushing, itching and rash.   Musculoskeletal: Positive for arthritis and joint pain. Negative for falls and muscle cramps.   Gastrointestinal: Positive for heartburn. Negative for abdominal pain, diarrhea, nausea and vomiting.   Genitourinary: Negative for hematuria.   Neurological: Positive for loss of balance and weakness. Negative for excessive daytime sleepiness, dizziness, headaches and tremors.   Psychiatric/Behavioral: Negative for substance abuse. The patient is not nervous/anxious.           Objective:   /56   Pulse 105   Ht 152.4 cm (60\")   Wt 39.9 kg (88 lb)   LMP  (LMP Unknown)   SpO2 98%   BMI 17.19 kg/m²         Vitals reviewed.   Constitutional:       Appearance: Healthy appearance. Well-developed and not in distress.   Eyes:      Conjunctiva/sclera: Conjunctivae normal.      Pupils: Pupils are equal, round, and reactive to light.   HENT:      Head: Normocephalic and atraumatic.    Mouth/Throat:      Pharynx: Oropharynx is clear.   Neck:      Thyroid: Thyroid normal. No thyromegaly.      " Vascular: Normal carotid pulses. No carotid bruit or JVD. JVD normal.      Lymphadenopathy: No cervical adenopathy.   Pulmonary:      Effort: No respiratory distress.      Breath sounds: Examination of the right-middle field reveals decreased breath sounds. Examination of the left-middle field reveals decreased breath sounds. Examination of the right-lower field reveals decreased breath sounds. Examination of the left-lower field reveals decreased breath sounds. Decreased breath sounds present. No wheezing. No rales.   Chest:      Chest wall: Not tender to palpatation.   Cardiovascular:      Normal rate. Regular rhythm.      No gallop.   Pulses:     Carotid: 1+ on the left side and 2+ on the right side.     Dorsalis pedis: 0 bilaterally.     Posterior tibial: 0 bilaterally.  Abdominal:      General: There is no distension or abdominal bruit.      Palpations: There is no abdominal mass.      Tenderness: There is no abdominal tenderness. There is no rebound.   Musculoskeletal:         General: No tenderness or deformity.      Extremities: No clubbing present.Skin:     General: Skin is warm and dry. There is no cyanosis.      Findings: No rash.   Neurological:      Mental Status: Alert, oriented to person, place, and time and oriented to person, place and time.           ECG 12 Lead    Date/Time: 5/12/2021 12:15 PM  Performed by: Maximino Jernigan MD  Authorized by: Maximino Jernigan MD   Comparison: compared with previous ECG from 2/13/2019  Similar to previous ECG  Rhythm: sinus rhythm  Other findings: low voltage    Clinical impression: non-specific ECG                  Assessment:   Assessment/Plan      Diagnoses and all orders for this visit:    1. Dyspnea on exertion (Primary)  -     ECG 12 Lead    2. Coronary artery disease involving native coronary artery of native heart with other form of angina pectoris (CMS/[facility])    3. PAD (peripheral artery disease) (CMS/[facility])    4. Tobacco abuse    5. Alcohol abuse, daily  use      1.  Coronary artery disease, no ischemic evaluation recently, severe dyspnea on exertion, functional class III lifestyle limiting despite medical therapy (unable to take antianginals due to hypotension).  2.  Peripheral arterial disease known bilateral occluded iliac arteries.  Was her life style limiting symptom, but now limited by dyspnea  3.  Ongoing tobacco abuse  4.  Alcohol abuse  5.  Unknown lipid status  6.  Frailty  7.  Moderate COPD    Recommendations:  1.  Discussed at length the patient's exertional fatigue and likely be multifactorial given her known moderate COPD, weight loss/malnutrition, as well as likely coronary artery disease.  2.  Discussed options of ischemic evaluation, patient refuses noninvasive evaluation, therefore proceed directly left heart catheterization.  Given abnormal left Mark's test, we will do a right radial artery.  3.  Check fasting profile at the time of the heart cath  4.  Pending heart cath, consider switching to low-dose Xarelto from her current antiplatelets.       Alessandra ROTHMAN scribed portions of this dictation for Dr. Maximino Jernigan.   I have seen and examined the patient, I have reviewed the note, discussed the case with the advance practice clinician, made necessary changes and I agree with the final note.    Maximino Jernigan MD  05/12/21  12:28 EDT        Dictated utilizing Dragon dictation

## 2021-05-17 PROBLEM — I25.10 CORONARY ARTERY DISEASE INVOLVING NATIVE CORONARY ARTERY OF NATIVE HEART: Status: ACTIVE | Noted: 2021-05-17

## 2021-05-17 PROBLEM — R06.09 DYSPNEA ON EXERTION: Status: ACTIVE | Noted: 2021-05-17

## 2021-05-21 RX ORDER — CILOSTAZOL 50 MG/1
TABLET ORAL
Qty: 60 TABLET | Refills: 0 | Status: SHIPPED | OUTPATIENT
Start: 2021-05-21 | End: 2021-06-30 | Stop reason: SDUPTHER

## 2021-05-28 ENCOUNTER — HOSPITAL ENCOUNTER (OUTPATIENT)
Facility: HOSPITAL | Age: 81
Setting detail: HOSPITAL OUTPATIENT SURGERY
Discharge: HOME | End: 2021-05-28
Attending: INTERNAL MEDICINE | Admitting: INTERNAL MEDICINE

## 2021-05-28 ENCOUNTER — APPOINTMENT (OUTPATIENT)
Dept: GENERAL RADIOLOGY | Facility: HOSPITAL | Age: 81
End: 2021-05-28

## 2021-05-28 VITALS
SYSTOLIC BLOOD PRESSURE: 104 MMHG | HEIGHT: 60 IN | DIASTOLIC BLOOD PRESSURE: 50 MMHG | WEIGHT: 94.8 LBS | HEART RATE: 78 BPM | TEMPERATURE: 96.8 F | OXYGEN SATURATION: 97 % | RESPIRATION RATE: 18 BRPM | BODY MASS INDEX: 18.61 KG/M2

## 2021-05-28 DIAGNOSIS — F51.01 PRIMARY INSOMNIA: ICD-10-CM

## 2021-05-28 DIAGNOSIS — R06.09 DYSPNEA ON EXERTION: ICD-10-CM

## 2021-05-28 DIAGNOSIS — I25.10 CORONARY ARTERY DISEASE INVOLVING NATIVE CORONARY ARTERY OF NATIVE HEART, ANGINA PRESENCE UNSPECIFIED: ICD-10-CM

## 2021-05-28 LAB
ALBUMIN SERPL-MCNC: 4.1 G/DL (ref 3.5–5.2)
ALBUMIN/GLOB SERPL: 1.8 G/DL
ALP SERPL-CCNC: 53 U/L (ref 39–117)
ALT SERPL W P-5'-P-CCNC: 10 U/L (ref 1–33)
ANION GAP SERPL CALCULATED.3IONS-SCNC: 10 MMOL/L (ref 5–15)
AST SERPL-CCNC: 19 U/L (ref 1–32)
BILIRUB SERPL-MCNC: 0.3 MG/DL (ref 0–1.2)
BUN SERPL-MCNC: 15 MG/DL (ref 8–23)
BUN/CREAT SERPL: 28.8 (ref 7–25)
CALCIUM SPEC-SCNC: 9.6 MG/DL (ref 8.6–10.5)
CHLORIDE SERPL-SCNC: 100 MMOL/L (ref 98–107)
CHOLEST SERPL-MCNC: 221 MG/DL (ref 0–200)
CO2 SERPL-SCNC: 26 MMOL/L (ref 22–29)
CREAT SERPL-MCNC: 0.52 MG/DL (ref 0.57–1)
DEPRECATED RDW RBC AUTO: 51.3 FL (ref 37–54)
ERYTHROCYTE [DISTWIDTH] IN BLOOD BY AUTOMATED COUNT: 12.7 % (ref 12.3–15.4)
GFR SERPL CREATININE-BSD FRML MDRD: 113 ML/MIN/1.73
GLOBULIN UR ELPH-MCNC: 2.3 GM/DL
GLUCOSE SERPL-MCNC: 103 MG/DL (ref 65–99)
HBA1C MFR BLD: 4.4 % (ref 4.8–5.6)
HCT VFR BLD AUTO: 34 % (ref 34–46.6)
HDLC SERPL-MCNC: 104 MG/DL (ref 40–60)
HGB BLD-MCNC: 11.1 G/DL (ref 12–15.9)
LDLC SERPL CALC-MCNC: 104 MG/DL (ref 0–100)
LDLC/HDLC SERPL: 0.98 {RATIO}
MCH RBC QN AUTO: 35.6 PG (ref 26.6–33)
MCHC RBC AUTO-ENTMCNC: 32.6 G/DL (ref 31.5–35.7)
MCV RBC AUTO: 109 FL (ref 79–97)
PLATELET # BLD AUTO: 332 10*3/MM3 (ref 140–450)
PMV BLD AUTO: 9.1 FL (ref 6–12)
POTASSIUM SERPL-SCNC: 4.3 MMOL/L (ref 3.5–5.2)
PROT SERPL-MCNC: 6.4 G/DL (ref 6–8.5)
RBC # BLD AUTO: 3.12 10*6/MM3 (ref 3.77–5.28)
SODIUM SERPL-SCNC: 136 MMOL/L (ref 136–145)
TRIGL SERPL-MCNC: 73 MG/DL (ref 0–150)
VLDLC SERPL-MCNC: 13 MG/DL (ref 5–40)
WBC # BLD AUTO: 4.4 10*3/MM3 (ref 3.4–10.8)

## 2021-05-28 PROCEDURE — 93458 L HRT ARTERY/VENTRICLE ANGIO: CPT | Performed by: INTERNAL MEDICINE

## 2021-05-28 PROCEDURE — 71045 X-RAY EXAM CHEST 1 VIEW: CPT

## 2021-05-28 PROCEDURE — C1894 INTRO/SHEATH, NON-LASER: HCPCS | Performed by: INTERNAL MEDICINE

## 2021-05-28 PROCEDURE — 25010000002 FENTANYL CITRATE (PF) 50 MCG/ML SOLUTION: Performed by: INTERNAL MEDICINE

## 2021-05-28 PROCEDURE — 80053 COMPREHEN METABOLIC PANEL: CPT | Performed by: NURSE PRACTITIONER

## 2021-05-28 PROCEDURE — C1769 GUIDE WIRE: HCPCS | Performed by: INTERNAL MEDICINE

## 2021-05-28 PROCEDURE — 82565 ASSAY OF CREATININE: CPT

## 2021-05-28 PROCEDURE — 25010000002 HEPARIN (PORCINE) PER 1000 UNITS: Performed by: INTERNAL MEDICINE

## 2021-05-28 PROCEDURE — 85027 COMPLETE CBC AUTOMATED: CPT | Performed by: NURSE PRACTITIONER

## 2021-05-28 PROCEDURE — 93452 LEFT HRT CATH W/VENTRCLGRPHY: CPT | Performed by: INTERNAL MEDICINE

## 2021-05-28 PROCEDURE — 80061 LIPID PANEL: CPT | Performed by: NURSE PRACTITIONER

## 2021-05-28 PROCEDURE — 25010000002 MIDAZOLAM PER 1 MG: Performed by: INTERNAL MEDICINE

## 2021-05-28 PROCEDURE — 83036 HEMOGLOBIN GLYCOSYLATED A1C: CPT | Performed by: NURSE PRACTITIONER

## 2021-05-28 PROCEDURE — 25010000002 DIPHENHYDRAMINE PER 50 MG: Performed by: INTERNAL MEDICINE

## 2021-05-28 RX ORDER — LIDOCAINE HYDROCHLORIDE 10 MG/ML
INJECTION, SOLUTION EPIDURAL; INFILTRATION; INTRACAUDAL; PERINEURAL AS NEEDED
Status: DISCONTINUED | OUTPATIENT
Start: 2021-05-28 | End: 2021-05-28 | Stop reason: HOSPADM

## 2021-05-28 RX ORDER — MIDAZOLAM HYDROCHLORIDE 1 MG/ML
INJECTION INTRAMUSCULAR; INTRAVENOUS AS NEEDED
Status: DISCONTINUED | OUTPATIENT
Start: 2021-05-28 | End: 2021-05-28 | Stop reason: HOSPADM

## 2021-05-28 RX ORDER — SODIUM CHLORIDE 9 MG/ML
3 INJECTION, SOLUTION INTRAVENOUS CONTINUOUS
Status: DISCONTINUED | OUTPATIENT
Start: 2021-05-28 | End: 2021-05-28 | Stop reason: HOSPADM

## 2021-05-28 RX ORDER — ASPIRIN 325 MG
325 TABLET, DELAYED RELEASE (ENTERIC COATED) ORAL DAILY
Status: DISCONTINUED | OUTPATIENT
Start: 2021-05-29 | End: 2021-05-28 | Stop reason: HOSPADM

## 2021-05-28 RX ORDER — SODIUM CHLORIDE 0.9 % (FLUSH) 0.9 %
1-10 SYRINGE (ML) INJECTION AS NEEDED
Status: DISCONTINUED | OUTPATIENT
Start: 2021-05-28 | End: 2021-05-28 | Stop reason: HOSPADM

## 2021-05-28 RX ORDER — DIPHENHYDRAMINE HYDROCHLORIDE 50 MG/ML
INJECTION INTRAMUSCULAR; INTRAVENOUS AS NEEDED
Status: DISCONTINUED | OUTPATIENT
Start: 2021-05-28 | End: 2021-05-28 | Stop reason: HOSPADM

## 2021-05-28 RX ORDER — NITROGLYCERIN 0.4 MG/1
0.4 TABLET SUBLINGUAL
Status: DISCONTINUED | OUTPATIENT
Start: 2021-05-28 | End: 2021-05-28 | Stop reason: HOSPADM

## 2021-05-28 RX ORDER — SODIUM CHLORIDE 9 MG/ML
1-3 INJECTION, SOLUTION INTRAVENOUS CONTINUOUS
Status: DISCONTINUED | OUTPATIENT
Start: 2021-05-28 | End: 2021-05-28

## 2021-05-28 RX ORDER — ONDANSETRON 2 MG/ML
4 INJECTION INTRAMUSCULAR; INTRAVENOUS EVERY 6 HOURS PRN
Status: DISCONTINUED | OUTPATIENT
Start: 2021-05-28 | End: 2021-05-28 | Stop reason: HOSPADM

## 2021-05-28 RX ORDER — ASPIRIN 325 MG
325 TABLET ORAL ONCE
Status: COMPLETED | OUTPATIENT
Start: 2021-05-28 | End: 2021-05-28

## 2021-05-28 RX ORDER — FENTANYL CITRATE 50 UG/ML
INJECTION, SOLUTION INTRAMUSCULAR; INTRAVENOUS AS NEEDED
Status: DISCONTINUED | OUTPATIENT
Start: 2021-05-28 | End: 2021-05-28 | Stop reason: HOSPADM

## 2021-05-28 RX ORDER — SODIUM CHLORIDE 0.9 % (FLUSH) 0.9 %
3 SYRINGE (ML) INJECTION EVERY 12 HOURS SCHEDULED
Status: DISCONTINUED | OUTPATIENT
Start: 2021-05-28 | End: 2021-05-28 | Stop reason: HOSPADM

## 2021-05-28 RX ORDER — ACETAMINOPHEN 325 MG/1
650 TABLET ORAL EVERY 4 HOURS PRN
Status: DISCONTINUED | OUTPATIENT
Start: 2021-05-28 | End: 2021-05-28 | Stop reason: HOSPADM

## 2021-05-28 RX ADMIN — ASPIRIN 325 MG ORAL TABLET 325 MG: 325 PILL ORAL at 09:43

## 2021-06-01 RX ORDER — AMITRIPTYLINE HYDROCHLORIDE 10 MG/1
10 TABLET, FILM COATED ORAL NIGHTLY
Qty: 30 TABLET | Refills: 0 | Status: SHIPPED | OUTPATIENT
Start: 2021-06-01 | End: 2021-06-30

## 2021-06-03 ENCOUNTER — OFFICE VISIT (OUTPATIENT)
Dept: FAMILY MEDICINE CLINIC | Facility: CLINIC | Age: 81
End: 2021-06-03

## 2021-06-03 VITALS
OXYGEN SATURATION: 97 % | HEART RATE: 82 BPM | RESPIRATION RATE: 16 BRPM | BODY MASS INDEX: 18.06 KG/M2 | DIASTOLIC BLOOD PRESSURE: 70 MMHG | WEIGHT: 92 LBS | TEMPERATURE: 98 F | HEIGHT: 60 IN | SYSTOLIC BLOOD PRESSURE: 122 MMHG

## 2021-06-03 DIAGNOSIS — J43.1 PANLOBULAR EMPHYSEMA (HCC): ICD-10-CM

## 2021-06-03 DIAGNOSIS — I73.9 PAD (PERIPHERAL ARTERY DISEASE) (HCC): Primary | ICD-10-CM

## 2021-06-03 PROCEDURE — 99212 OFFICE O/P EST SF 10 MIN: CPT | Performed by: FAMILY MEDICINE

## 2021-06-03 NOTE — PROGRESS NOTES
"Chief Complaint  F/u after attempting to have heart cath (per pt unable to complete 'couldn't find a vein' )    Subjective          Shirley Calhoun presents to [facility]  History of Present Illness.  Heart catheretization was attempted on 5/28/21 per Dr. Jernigan, however it was unsuccessful due to poor arterial access. She has a follow up appointment with cardiology in July 2021.  She has no acute complaints today.  She is requesting samples of Trelegy inhaler, if available.       The following portions of the patient's history were reviewed and updated as appropriate: allergies, current medications, past family history, past medical history, past social history, past surgical history and problem list.    Objective   Vital Signs:   /70   Pulse 82   Temp 98 °F (36.7 °C)   Resp 16   Ht 152.4 cm (60\")   Wt 41.7 kg (92 lb)   SpO2 97%   BMI 17.97 kg/m²     Physical Exam  Vitals and nursing note reviewed.   Constitutional:       Appearance: She is well-developed.   HENT:      Head: Normocephalic and atraumatic.      Right Ear: External ear normal.      Left Ear: External ear normal.      Nose: Nose normal.   Eyes:      Conjunctiva/sclera: Conjunctivae normal.   Cardiovascular:      Rate and Rhythm: Normal rate and regular rhythm.      Heart sounds: Normal heart sounds.   Pulmonary:      Effort: Pulmonary effort is normal.      Breath sounds: Normal breath sounds. No wheezing.   Musculoskeletal:         General: No swelling or deformity.      Cervical back: Neck supple.   Skin:     General: Skin is warm.   Neurological:      Mental Status: She is alert and oriented to person, place, and time.        Result Review :                 Assessment and Plan    Diagnoses and all orders for this visit:    1. PAD (peripheral artery disease) (CMS/HCC) (Primary)    2. Panlobular emphysema (CMS/HCC)    She will follow up with cardiology as scheduled in July 2021.  If chest pain returns, " advised to go to ER for evaluation.    Trelegy sample x 3 Lot [id number], Exp Jul 2022      Follow Up   Return if symptoms worsen or fail to improve.  Patient was given instructions and counseling regarding her condition or for health maintenance advice. Please see specific information pulled into the AVS if appropriate.

## 2021-06-04 LAB — CREAT BLDA-MCNC: 0.4 MG/DL (ref 0.6–1.3)

## 2021-06-30 ENCOUNTER — OFFICE VISIT (OUTPATIENT)
Dept: FAMILY MEDICINE CLINIC | Facility: CLINIC | Age: 81
End: 2021-06-30

## 2021-06-30 VITALS
DIASTOLIC BLOOD PRESSURE: 78 MMHG | HEIGHT: 60 IN | TEMPERATURE: 97.1 F | RESPIRATION RATE: 28 BRPM | SYSTOLIC BLOOD PRESSURE: 120 MMHG | WEIGHT: 83.4 LBS | BODY MASS INDEX: 16.37 KG/M2 | OXYGEN SATURATION: 95 % | HEART RATE: 93 BPM

## 2021-06-30 DIAGNOSIS — I73.9 PAD (PERIPHERAL ARTERY DISEASE) (HCC): ICD-10-CM

## 2021-06-30 DIAGNOSIS — F51.01 PRIMARY INSOMNIA: Primary | ICD-10-CM

## 2021-06-30 DIAGNOSIS — R63.6 MILDLY UNDERWEIGHT ADULT: ICD-10-CM

## 2021-06-30 PROCEDURE — 99213 OFFICE O/P EST LOW 20 MIN: CPT | Performed by: NURSE PRACTITIONER

## 2021-06-30 RX ORDER — CILOSTAZOL 50 MG/1
50 TABLET ORAL 2 TIMES DAILY
Qty: 180 TABLET | Refills: 1 | Status: SHIPPED | OUTPATIENT
Start: 2021-06-30 | End: 2021-07-07

## 2021-06-30 RX ORDER — MIRTAZAPINE 7.5 MG/1
7.5 TABLET, FILM COATED ORAL NIGHTLY
Qty: 30 TABLET | Refills: 1 | Status: SHIPPED | OUTPATIENT
Start: 2021-06-30 | End: 2022-01-01 | Stop reason: ALTCHOICE

## 2021-06-30 NOTE — PROGRESS NOTES
"Chief Complaint  sinus congestion & drainage, dry cough, and RF: Cilostazol (cannot get refill by Dr. Andrade)    Subjective          Shirley Calhoun presents to [facility]  History of Present Illness  Dry cough that started yesterday. Thinks the humidity and heat was affecting her breathing.  But she is feeling better today. PND, runny/stuffy nose.  She has been taking her Loratadine allergy medication which does help.  Using Albuterol prn and Trelegy inhaler daily for her COPD    PAD  She needs a refill on her Pletal but cannot get an answer from the office  ASCVD  She has a follow up with Dr Jernigan's office next week  She continues to smoke and drink alcohol     Insomnia  Was started on Amitriptyline for sleep and it is too strong, she cannot wake up the next day  The Trazodone no longer is helping her sleep. She would like to try something else  Her weight continues to go down; at 83 lbs today she says she is eating    Objective   Vital Signs:   /78   Pulse 93   Temp 97.1 °F (36.2 °C)   Resp 28   Ht 152.4 cm (60\")   Wt 37.8 kg (83 lb 6.4 oz)   SpO2 95%   BMI 16.29 kg/m²     Physical Exam  Vitals and nursing note reviewed.   Constitutional:       Appearance: Normal appearance. She is underweight.   HENT:      Head: Normocephalic.      Right Ear: External ear normal.      Left Ear: External ear normal.      Nose: Nose normal. No congestion or rhinorrhea.      Mouth/Throat:      Lips: Pink.      Mouth: Mucous membranes are moist.   Cardiovascular:      Rate and Rhythm: Normal rate and regular rhythm.      Pulses: Normal pulses.      Heart sounds: Normal heart sounds, S1 normal and S2 normal.   Pulmonary:      Effort: Pulmonary effort is normal.      Breath sounds: Normal breath sounds and air entry.   Musculoskeletal:         General: Normal range of motion.      Cervical back: Neck supple.   Lymphadenopathy:      Head:      Right side of head: No tonsillar, preauricular " or posterior auricular adenopathy.      Left side of head: No tonsillar, preauricular or posterior auricular adenopathy.      Cervical: No cervical adenopathy.   Skin:     General: Skin is warm and dry.      Capillary Refill: Capillary refill takes less than 2 seconds.   Neurological:      Mental Status: She is alert and oriented to person, place, and time.   Psychiatric:         Mood and Affect: Mood and affect normal.         Speech: Speech normal.         Behavior: Behavior is cooperative.         Thought Content: Thought content normal.        Result Review :                 Assessment and Plan    Diagnoses and all orders for this visit:    1. Primary insomnia (Primary)  -     mirtazapine (REMERON) 7.5 MG tablet; Take 1 tablet by mouth Every Night.  Dispense: 30 tablet; Refill: 1    2. Mildly underweight adult  -     mirtazapine (REMERON) 7.5 MG tablet; Take 1 tablet by mouth Every Night.  Dispense: 30 tablet; Refill: 1    3. PAD (peripheral artery disease) (CMS/HCC)  -     cilostazol (PLETAL) 50 MG tablet; Take 1 tablet by mouth 2 (Two) Times a Day.  Dispense: 180 tablet; Refill: 1        Follow Up   No follow-ups on file.  Patient was given instructions and counseling regarding her condition or for health maintenance advice. Please see specific information pulled into the AVS if appropriate.     Will refill her Cilostazol   Will do a trial of Mirtazapine to see if this willl help with sleep and mood and appetite, starting with the lowest dose. She is to call back  Keep appt with Dr Jernigan/[facility]

## 2021-07-07 ENCOUNTER — OFFICE VISIT (OUTPATIENT)
Dept: CARDIOLOGY | Facility: CLINIC | Age: 81
End: 2021-07-07

## 2021-07-07 VITALS
HEIGHT: 63 IN | BODY MASS INDEX: 14.71 KG/M2 | WEIGHT: 83 LBS | HEART RATE: 95 BPM | DIASTOLIC BLOOD PRESSURE: 72 MMHG | OXYGEN SATURATION: 98 % | SYSTOLIC BLOOD PRESSURE: 132 MMHG

## 2021-07-07 DIAGNOSIS — E78.5 DYSLIPIDEMIA: ICD-10-CM

## 2021-07-07 DIAGNOSIS — I73.9 PAD (PERIPHERAL ARTERY DISEASE) (HCC): ICD-10-CM

## 2021-07-07 DIAGNOSIS — R06.09 DYSPNEA ON EXERTION: Primary | ICD-10-CM

## 2021-07-07 PROCEDURE — 99214 OFFICE O/P EST MOD 30 MIN: CPT | Performed by: NURSE PRACTITIONER

## 2021-07-07 RX ORDER — ATORVASTATIN CALCIUM 20 MG/1
20 TABLET, FILM COATED ORAL DAILY
Qty: 30 TABLET | Refills: 11 | Status: SHIPPED | OUTPATIENT
Start: 2021-07-07 | End: 2022-01-01 | Stop reason: ALTCHOICE

## 2021-07-07 NOTE — PROGRESS NOTES
Subjective:     Encounter Date:07/07/2021    Primary Care Physician: Lara Kline APRN      Patient ID: Shirley Calhoun is a 81 y.o. female.    Chief Complaint:Follow-up    PROBLEM LIST:  1. Dyspnea on exertion  2. PAD  a. AA with runoff Dr. Andrade left iliofemoral system nearly impossible.  Subintimal dissection that did not compromise flow to the native aorta.  CTA demonstrated severe infrarenal atherosclerosis.  Occluded right iliac and occluded left and right SFA.  3. Dyslipidemia  4. Ongoing tobacco abuse  5. COPD  6. Breast cancer  a. Bilateral mastectomy  b. No chemo or XRT  7. Skin cancer with removal  8. GERD  9. Vertigo  10. Depression  11. History of subdural hematoma  a. Surgical intervention  12. Surgeries:  a. Lumbar back surgery  b. Right total hip replacement  c. Left knee surgery  d. Bilateral mastectomy   e. Cataract extraction      No Known Allergies      Current Outpatient Medications:   •  albuterol sulfate  (90 Base) MCG/ACT inhaler, INHALE 2 PUFFS BY MOUTH EVERY 4 HOURS AS NEEDED FOR WHEEZING, Disp: 18 g, Rfl: 0  •  aspirin 81 MG EC tablet, Take 1 tablet by mouth Daily., Disp: 30 tablet, Rfl: 6  •  clopidogrel (PLAVIX) 75 MG tablet, Take 1 tablet by mouth Daily., Disp: 30 tablet, Rfl: 6  •  Cyanocobalamin (VITAMIN B 12 PO), Take  by mouth., Disp: , Rfl:   •  DULoxetine (CYMBALTA) 60 MG capsule, TAKE 1 CAPSULE BY MOUTH EVERY DAY, Disp: 90 capsule, Rfl: 1  •  Fluticasone-Umeclidin-Vilant 100-62.5-25 MCG/INH aerosol powder , Inhale 1 puff Daily., Disp: 60 each, Rfl: 5  •  loratadine (CLARITIN) 10 MG tablet, Take 10 mg by mouth Daily., Disp: , Rfl:   •  MILK THISTLE PO, Take  by mouth., Disp: , Rfl:   •  polyethylene glycol (MIRALAX) packet, Take 17 g by mouth Daily., Disp: , Rfl:   •  traZODone (DESYREL) 150 MG tablet, Take 150 mg by mouth Every Night., Disp: , Rfl:   •  cilostazol (PLETAL) 50 MG tablet, Take 1 tablet by mouth 2 (Two) Times a Day., Disp: 180 tablet, Rfl: 1  •  mirtazapine  "(REMERON) 7.5 MG tablet, Take 1 tablet by mouth Every Night., Disp: 30 tablet, Rfl: 1        History of Present Illness    Patient is an 81-year-old  female who is following up today status post aborted cardiac catheterization.  Patient reports since being discharged she thinks she may feel a little bit better.  Breathing is somewhat improved.  She has not been taking Pletal for at least a month.  No reported syncope, near syncope.  No significant chest pain.  Has not been on statin.    The following portions of the patient's history were reviewed and updated as appropriate: allergies, current medications, past family history, past medical history, past social history, past surgical history and problem list.      Social History     Tobacco Use   • Smoking status: Current Every Day Smoker     Packs/day: 1.00     Years: 50.00     Pack years: 50.00     Types: Cigarettes   • Smokeless tobacco: Never Used   Substance Use Topics   • Alcohol use: Yes     Alcohol/week: 28.0 standard drinks     Types: 28 Shots of liquor per week     Comment: hx of alcohol abuse drinking vodka daily   • Drug use: No         Review of Systems   Constitutional: Positive for malaise/fatigue.   Cardiovascular: Positive for dyspnea on exertion. Negative for chest pain, leg swelling, palpitations and syncope.   Respiratory: Positive for shortness of breath and wheezing.    Hematologic/Lymphatic: Negative for bleeding problem. Does not bruise/bleed easily.   Skin: Negative for rash.   Musculoskeletal: Positive for arthritis and joint pain. Negative for muscle weakness and myalgias.   Gastrointestinal: Negative for heartburn, nausea and vomiting.   Neurological: Negative for dizziness, light-headedness, loss of balance and numbness.          Objective:   /72 (BP Location: Left arm)   Pulse 95   Ht 160 cm (63\")   Wt 37.6 kg (83 lb)   LMP  (LMP Unknown)   SpO2 98%   BMI 14.70 kg/m²         Vitals reviewed.   Constitutional:       " Appearance: Well-developed and not in distress. Frail.   Neck:      Vascular: No JVD.      Trachea: No tracheal deviation.   Pulmonary:      Effort: Pulmonary effort is normal.      Comments: Decreased breath sounds throughout  Cardiovascular:      Normal rate. Regular rhythm.   Edema:     Peripheral edema absent.   Abdominal:      General: Bowel sounds are normal.      Palpations: Abdomen is soft.      Tenderness: There is no abdominal tenderness.   Musculoskeletal:         General: No deformity. Skin:     General: Skin is warm and dry.   Neurological:      Mental Status: Alert and oriented to person, place, and time.         Procedures          Assessment:   Assessment/Plan      Diagnoses and all orders for this visit:    1. Dyspnea on exertion (Primary), patient thinks symptoms may be mildly improved.  Cardiac catheterization was unable to be completely performed secondary to arterial loop as well as noncompliance from patient.  Medical therapy to be used.  On aspirin and Plavix for possible CAD.    2. PAD (peripheral artery disease) (CMS/[facility]), followed by Dr. Andrade.  On aspirin and Plavix.  Has not been taking Pletal.    3. Dyslipidemia, not currently on statin.      Plan:  1. We will initiate Lipitor 20 mg p.o. nightly secondary to PAD and suspected CAD.  2. As patient has not been taking Pletal we will simply discontinue this medication.  Maintain aspirin and Plavix.  3. Continue other current cardiac medications.  4. Follow-up in 1 year's time or sooner if needed.       Alessandra ROTHMAN     Dictated utilizing Dragon dictation

## 2021-07-29 ENCOUNTER — OFFICE VISIT (OUTPATIENT)
Dept: FAMILY MEDICINE CLINIC | Facility: CLINIC | Age: 81
End: 2021-07-29

## 2021-07-29 VITALS
TEMPERATURE: 98.7 F | HEART RATE: 90 BPM | DIASTOLIC BLOOD PRESSURE: 60 MMHG | HEIGHT: 60 IN | WEIGHT: 87.5 LBS | SYSTOLIC BLOOD PRESSURE: 98 MMHG | BODY MASS INDEX: 17.18 KG/M2 | OXYGEN SATURATION: 99 %

## 2021-07-29 DIAGNOSIS — M79.605 BILATERAL LEG PAIN: ICD-10-CM

## 2021-07-29 DIAGNOSIS — M54.50 CHRONIC MIDLINE LOW BACK PAIN WITHOUT SCIATICA: Primary | ICD-10-CM

## 2021-07-29 DIAGNOSIS — M79.604 BILATERAL LEG PAIN: ICD-10-CM

## 2021-07-29 DIAGNOSIS — G89.29 CHRONIC MIDLINE LOW BACK PAIN WITHOUT SCIATICA: Primary | ICD-10-CM

## 2021-07-29 PROCEDURE — 99213 OFFICE O/P EST LOW 20 MIN: CPT | Performed by: PHYSICIAN ASSISTANT

## 2021-07-29 NOTE — PROGRESS NOTES
"Subjective   Shirley Calhoun is a 81 y.o. female.     History of Present Illness   Patient presents requesting referral to [facility] physical therapy.  Has an appointment scheduled for tomorrow but needs referral sent over.  Patient has chronic low back pain as well as bilateral leg pain.  Patient been diagnosed with peripheral vascular disease.  Describes symptoms consistent with claudication.  Is currently taking aspirin and Plavix.  Has hard time walking long distances due to pain.  Patient states she was told she was not a surgical candidate  Patient is a current smoker.  1 pack/day.  Has no interest in quitting.  Notes she try to quit smoking in the past and gained a lot of weight so she does not wish to quit smoking again.    The following portions of the patient's history were reviewed and updated as appropriate: allergies, current medications, past family history, past medical history, past social history, past surgical history and problem list.    Review of Systems   Constitutional: Negative for chills and fever.   Respiratory: Negative for cough, shortness of breath and wheezing.    Cardiovascular: Negative for chest pain.        Claudication leg pain   Musculoskeletal: Positive for arthralgias and back pain.       Objective    Blood pressure 98/60, pulse 90, temperature 98.7 °F (37.1 °C), temperature source Temporal, height 152.4 cm (60\"), weight 39.7 kg (87 lb 8 oz), SpO2 99 %, not currently breastfeeding.     Physical Exam  Vitals and nursing note reviewed.   Constitutional:       Appearance: Normal appearance.      Comments: Thin   HENT:      Head: Normocephalic.      Right Ear: External ear normal.      Left Ear: External ear normal.      Nose: Nose normal.   Eyes:      Conjunctiva/sclera: Conjunctivae normal.   Cardiovascular:      Rate and Rhythm: Normal rate and regular rhythm.      Heart sounds: Normal heart sounds.   Pulmonary:      Effort: Pulmonary effort is normal. No respiratory distress. "      Breath sounds: Normal breath sounds. No wheezing or rhonchi.   Musculoskeletal:      Lumbar back: Tenderness and bony tenderness present. Decreased range of motion.   Neurological:      Mental Status: She is alert.   Psychiatric:         Mood and Affect: Mood normal.         Behavior: Behavior normal.         Thought Content: Thought content normal.         Judgment: Judgment normal.         Assessment/Plan   Diagnoses and all orders for this visit:    1. Chronic midline low back pain without sciatica (Primary)  -     Ambulatory Referral to Physical Therapy Evaluate and treat    2. Bilateral leg pain  -     Ambulatory Referral to Physical Therapy Evaluate and treat      Referral to physical therapy placed per patient request.  Do encourage that she quits smoking, however patient does not have interest in quitting at this time.

## 2021-08-12 ENCOUNTER — OFFICE VISIT (OUTPATIENT)
Dept: FAMILY MEDICINE CLINIC | Facility: CLINIC | Age: 81
End: 2021-08-12

## 2021-08-12 VITALS
OXYGEN SATURATION: 95 % | SYSTOLIC BLOOD PRESSURE: 130 MMHG | HEIGHT: 60 IN | DIASTOLIC BLOOD PRESSURE: 60 MMHG | TEMPERATURE: 97.7 F | RESPIRATION RATE: 24 BRPM | WEIGHT: 85.4 LBS | BODY MASS INDEX: 16.77 KG/M2 | HEART RATE: 102 BPM

## 2021-08-12 DIAGNOSIS — I73.9 PAD (PERIPHERAL ARTERY DISEASE) (HCC): ICD-10-CM

## 2021-08-12 DIAGNOSIS — J43.1 PANLOBULAR EMPHYSEMA (HCC): Primary | ICD-10-CM

## 2021-08-12 PROCEDURE — 99213 OFFICE O/P EST LOW 20 MIN: CPT | Performed by: NURSE PRACTITIONER

## 2021-08-12 NOTE — PROGRESS NOTES
"Chief Complaint  Cough (for the past week )    Subjective          Shirley Calhoun presents to [facility]  History of Present Illness  Cough for the past week  juicy wet cough at times, dry cough at other times  No SOA or wheezing. Using her Trelegy daily which really helps.    No fever or chills, no runny nose or stuffy nose, no loss of taste or smell.  No allergy symptoms like PND or ear congestion.  Does not want a COVID test, she wears her mask and does not feel she needs one.    She is having some acid reflux (eating rich food a few days ago)    Legs hurt at times- claudication  Continues to smoke 1 ppd and drink vodka- not interested in quitting     Objective   Vital Signs:   /60   Pulse 102   Temp 97.7 °F (36.5 °C)   Resp 24   Ht 152.4 cm (60\")   Wt 38.7 kg (85 lb 6.4 oz)   SpO2 95%   BMI 16.68 kg/m²     Physical Exam  Vitals and nursing note reviewed.   Constitutional:       Appearance: Normal appearance. She is underweight.   HENT:      Head: Normocephalic.      Right Ear: External ear normal.      Left Ear: External ear normal.      Nose: Nose normal.   Eyes:      General: Lids are normal.   Cardiovascular:      Rate and Rhythm: Normal rate and regular rhythm.      Pulses: Normal pulses.      Heart sounds: Normal heart sounds, S1 normal and S2 normal. No murmur heard.   No friction rub. No gallop.    Pulmonary:      Effort: Pulmonary effort is normal. No respiratory distress.      Breath sounds: Normal breath sounds. No stridor. No wheezing, rhonchi or rales.   Abdominal:      Palpations: Abdomen is soft.   Musculoskeletal:      Cervical back: Normal range of motion and neck supple.      Right lower leg: No edema.      Left lower leg: No edema.   Lymphadenopathy:      Cervical: No cervical adenopathy.   Skin:     General: Skin is warm and dry.      Capillary Refill: Capillary refill takes less than 2 seconds.   Neurological:      Mental Status: She is alert and " oriented to person, place, and time.      Gait: Gait normal.   Psychiatric:         Mood and Affect: Mood normal.         Speech: Speech normal.         Behavior: Behavior normal. Behavior is cooperative.         Thought Content: Thought content normal.         Judgment: Judgment normal.        Result Review :                 Assessment and Plan    Diagnoses and all orders for this visit:    1. Panlobular emphysema (CMS/HCC) (Primary)    2. PAD (peripheral artery disease) (CMS/HCC)        Follow Up   Return if symptoms worsen or fail to improve.  Patient was given instructions and counseling regarding her condition or for health maintenance advice. Please see specific information pulled into the AVS if appropriate.   Use OTC Robitussin for cough  Recommend OTC Prilosec for a few days to help with cough follow up if not improving. Pt agrees.

## 2021-08-20 RX ORDER — DULOXETIN HYDROCHLORIDE 60 MG/1
CAPSULE, DELAYED RELEASE ORAL
Qty: 90 CAPSULE | Refills: 1 | Status: SHIPPED | OUTPATIENT
Start: 2021-08-20 | End: 2022-01-01 | Stop reason: SDUPTHER

## 2021-08-26 ENCOUNTER — TELEPHONE (OUTPATIENT)
Dept: FAMILY MEDICINE CLINIC | Facility: CLINIC | Age: 81
End: 2021-08-26

## 2021-08-26 NOTE — TELEPHONE ENCOUNTER
Caller: Shirley aClhoun    Relationship to patient: Self    Best call back number: [phone]     Chief complaint: COUGH     Requested date: 8-26-21 OR 8-27-21    Additional notes: PATIENT HAS A COUGH AND WANTS TO BE SEEN IN THE OFFICE

## 2021-08-27 ENCOUNTER — OFFICE VISIT (OUTPATIENT)
Dept: FAMILY MEDICINE CLINIC | Facility: CLINIC | Age: 81
End: 2021-08-27

## 2021-08-27 VITALS
SYSTOLIC BLOOD PRESSURE: 128 MMHG | BODY MASS INDEX: 16.33 KG/M2 | TEMPERATURE: 97.1 F | DIASTOLIC BLOOD PRESSURE: 60 MMHG | HEART RATE: 108 BPM | OXYGEN SATURATION: 97 % | HEIGHT: 60 IN | WEIGHT: 83.2 LBS | RESPIRATION RATE: 24 BRPM

## 2021-08-27 DIAGNOSIS — R05.9 COUGH: ICD-10-CM

## 2021-08-27 DIAGNOSIS — J44.1 COPD WITH EXACERBATION (HCC): Primary | ICD-10-CM

## 2021-08-27 PROCEDURE — 99213 OFFICE O/P EST LOW 20 MIN: CPT | Performed by: NURSE PRACTITIONER

## 2021-08-27 RX ORDER — METHYLPREDNISOLONE 4 MG/1
TABLET ORAL
Qty: 21 TABLET | Refills: 0 | Status: SHIPPED | OUTPATIENT
Start: 2021-08-27 | End: 2021-10-05

## 2021-08-27 RX ORDER — AMOXICILLIN AND CLAVULANATE POTASSIUM 875; 125 MG/1; MG/1
1 TABLET, FILM COATED ORAL 2 TIMES DAILY
Qty: 20 TABLET | Refills: 0 | Status: SHIPPED | OUTPATIENT
Start: 2021-08-27 | End: 2021-10-05

## 2021-08-27 NOTE — PROGRESS NOTES
"Chief Complaint  cough for the past two weeks    Subjective          Shirley Calhoun presents to [facility]  History of Present Illness  Cough for the past 2 weeks  Worried that she might have COVID  Using inhalers (more than she should)  Productive cough, worse in the morning and SOA if laying down  No loss of taste or smell, no fever or chills, no HA or body aches, some weakness  Circulation in left leg is not as good today and was cool and painful last night, she had to get up and walk some to try to get the circulation moving in it  She continues to smoke and drink Vodka   She has been vaccinated with the Asad & asad vaccine    Objective   Vital Signs:   /60   Pulse 108   Temp 97.1 °F (36.2 °C)   Resp 24   Ht 152.4 cm (60\")   Wt 37.7 kg (83 lb 3.2 oz)   SpO2 97%   BMI 16.25 kg/m²     Physical Exam  Vitals and nursing note reviewed.   Constitutional:       General: She is not in acute distress.     Appearance: She is underweight.   HENT:      Head: Normocephalic.      Nose: Nose normal.   Cardiovascular:      Rate and Rhythm: Regular rhythm.      Pulses: Normal pulses.      Heart sounds: Normal heart sounds, S1 normal and S2 normal.   Pulmonary:      Effort: Pulmonary effort is normal.      Breath sounds: Examination of the right-upper field reveals decreased breath sounds. Examination of the left-upper field reveals decreased breath sounds. Examination of the right-middle field reveals decreased breath sounds. Examination of the left-middle field reveals decreased breath sounds. Examination of the right-lower field reveals decreased breath sounds. Examination of the left-lower field reveals decreased breath sounds. Decreased breath sounds present. No wheezing, rhonchi or rales.   Musculoskeletal:         General: No tenderness or signs of injury.      Right lower leg: No edema.      Left lower leg: No edema.   Lymphadenopathy:      Head:      Right side of " head: No tonsillar, preauricular or posterior auricular adenopathy.      Left side of head: No tonsillar, preauricular or posterior auricular adenopathy.   Skin:     Capillary Refill: Capillary refill takes 2 to 3 seconds. Left toes slightly cooler in temperature than the right, cap refill 2-3, slightly darker color on left than right; prominent blood vessels  Neurological:      Mental Status: She is alert.   Psychiatric:         Behavior: Behavior normal. Behavior is cooperative.         Thought Content: Thought content normal.         Judgment: Judgment normal.        Result Review :                 Assessment and Plan    Diagnoses and all orders for this visit:    1. COPD with exacerbation (CMS/[facility]) (Primary)  -     amoxicillin-clavulanate (Augmentin) 875-125 MG per tablet; Take 1 tablet by mouth 2 (Two) Times a Day.  Dispense: 20 tablet; Refill: 0  -     methylPREDNISolone (MEDROL) 4 MG dose pack; Take as directed on package instructions.  Dispense: 21 tablet; Refill: 0    2. Cough  -     COVID-19,[facility] ROUTINE, NP/OP SWAB IN TRANSPORT MEDIA OR ESWAB 72 HR TAT - Swab, Anterior nasal; Future        Follow Up   No follow-ups on file.  Patient was given instructions and counseling regarding her condition or for health maintenance advice. Please see specific information pulled into the AVS if appropriate.   Will do COVID 19 test and notify of results  Will treat with Augmentin and Medrol dose matt  Recommend that Shirley get a pulse Oximeter to monitor O2 saturation if goes below 90 she should go to ER. Pt agrees.

## 2021-08-29 LAB
LABCORP SARS-COV-2, NAA 2 DAY TAT: NORMAL
SARS-COV-2 RNA RESP QL NAA+PROBE: NOT DETECTED

## 2021-08-30 ENCOUNTER — TELEPHONE (OUTPATIENT)
Dept: FAMILY MEDICINE CLINIC | Facility: CLINIC | Age: 81
End: 2021-08-30

## 2021-08-30 NOTE — TELEPHONE ENCOUNTER
Results are back but have not been reviewed. Lvm for pt. Other number listed in msg says 'You've reached [employer] and to dial ext?'

## 2021-08-30 NOTE — TELEPHONE ENCOUNTER
Caller: Shirley Calhoun    Relationship: Self    Best call back number: [phone]    Caller requesting test results:SELF    What test was performed: COVID -19 TEST     When was the test performed: 08/27/2021    Where was the test performed: IN OFFICE     Additional notes: THE PATIENT WOULD LIKE TO KNOW IT THE OFFICE HAS HER TEST RESULTS

## 2021-10-05 ENCOUNTER — OFFICE VISIT (OUTPATIENT)
Dept: FAMILY MEDICINE CLINIC | Facility: CLINIC | Age: 81
End: 2021-10-05

## 2021-10-05 VITALS
TEMPERATURE: 98 F | BODY MASS INDEX: 16.61 KG/M2 | RESPIRATION RATE: 18 BRPM | HEART RATE: 98 BPM | HEIGHT: 60 IN | WEIGHT: 84.6 LBS | DIASTOLIC BLOOD PRESSURE: 62 MMHG | SYSTOLIC BLOOD PRESSURE: 122 MMHG

## 2021-10-05 DIAGNOSIS — R11.2 NAUSEA AND VOMITING, INTRACTABILITY OF VOMITING NOT SPECIFIED, UNSPECIFIED VOMITING TYPE: ICD-10-CM

## 2021-10-05 DIAGNOSIS — N30.01 ACUTE CYSTITIS WITH HEMATURIA: Primary | ICD-10-CM

## 2021-10-05 LAB
BILIRUB BLD-MCNC: ABNORMAL MG/DL
CLARITY, POC: CLEAR
COLOR UR: ABNORMAL
GLUCOSE UR STRIP-MCNC: ABNORMAL MG/DL
KETONES UR QL: ABNORMAL
LEUKOCYTE EST, POC: ABNORMAL
NITRITE UR-MCNC: POSITIVE MG/ML
PH UR: 6.5 [PH] (ref 5–8)
PROT UR STRIP-MCNC: ABNORMAL MG/DL
RBC # UR STRIP: NEGATIVE /UL
SP GR UR: 1.01 (ref 1–1.03)
UROBILINOGEN UR QL: ABNORMAL

## 2021-10-05 PROCEDURE — 81003 URINALYSIS AUTO W/O SCOPE: CPT | Performed by: FAMILY MEDICINE

## 2021-10-05 PROCEDURE — 99213 OFFICE O/P EST LOW 20 MIN: CPT | Performed by: FAMILY MEDICINE

## 2021-10-05 RX ORDER — ONDANSETRON 8 MG/1
8 TABLET, ORALLY DISINTEGRATING ORAL EVERY 8 HOURS PRN
Qty: 20 TABLET | Refills: 1 | Status: SHIPPED | OUTPATIENT
Start: 2021-10-05 | End: 2021-10-13 | Stop reason: DRUGHIGH

## 2021-10-05 RX ORDER — CIPROFLOXACIN 500 MG/1
500 TABLET, FILM COATED ORAL 2 TIMES DAILY
Qty: 14 TABLET | Refills: 0 | Status: SHIPPED | OUTPATIENT
Start: 2021-10-05 | End: 2021-10-13

## 2021-10-05 NOTE — PROGRESS NOTES
Subjective   Shirley Calhoun is a 81 y.o. female.     History of Present Illness     4 days with urinary burning and discomfort  This is a common thing for her but she has not been on an antibiotic in quite a long time  No fever but she has had some nausea and vomiting          Review of Systems   Constitutional: Negative for fever.   Gastrointestinal: Positive for nausea.   Genitourinary: Positive for dysuria.       Objective   Physical Exam  Vitals and nursing note reviewed.   Constitutional:       General: She is not in acute distress.     Appearance: Normal appearance. She is well-developed.   Cardiovascular:      Rate and Rhythm: Normal rate and regular rhythm.      Heart sounds: Normal heart sounds.   Pulmonary:      Effort: Pulmonary effort is normal.      Breath sounds: Normal breath sounds.   Neurological:      Mental Status: She is alert and oriented to person, place, and time.   Psychiatric:         Mood and Affect: Mood normal.         Behavior: Behavior normal.         Thought Content: Thought content normal.         Judgment: Judgment normal.         Assessment/Plan   Diagnoses and all orders for this visit:    1. Acute cystitis with hematuria (Primary)  -     ciprofloxacin (Cipro) 500 MG tablet; Take 1 tablet by mouth 2 (Two) Times a Day.  Dispense: 14 tablet; Refill: 0  -     Urine Culture - , Urine, Clean Catch  -     POCT urinalysis dipstick, automated    2. Nausea and vomiting, intractability of vomiting not specified, unspecified vomiting type  -     ondansetron ODT (Zofran ODT) 8 MG disintegrating tablet; Place 1 tablet on the tongue Every 8 (Eight) Hours As Needed for Nausea or Vomiting.  Dispense: 20 tablet; Refill: 1    check urine culture and treat with cipro  PRN zofran and increase fluids as tolerated

## 2021-10-10 LAB
BACTERIA UR CULT: ABNORMAL
BACTERIA UR CULT: ABNORMAL

## 2021-10-11 DIAGNOSIS — B37.49 CANDIDA UTI: Primary | ICD-10-CM

## 2021-10-11 RX ORDER — FLUCONAZOLE 200 MG/1
200 TABLET ORAL DAILY
Qty: 14 TABLET | Refills: 0 | Status: SHIPPED | OUTPATIENT
Start: 2021-10-11 | End: 2021-11-11

## 2021-10-13 ENCOUNTER — OFFICE VISIT (OUTPATIENT)
Dept: FAMILY MEDICINE CLINIC | Facility: CLINIC | Age: 81
End: 2021-10-13

## 2021-10-13 VITALS
RESPIRATION RATE: 24 BRPM | BODY MASS INDEX: 17.36 KG/M2 | OXYGEN SATURATION: 97 % | TEMPERATURE: 97.3 F | SYSTOLIC BLOOD PRESSURE: 120 MMHG | HEIGHT: 60 IN | HEART RATE: 94 BPM | WEIGHT: 88.4 LBS | DIASTOLIC BLOOD PRESSURE: 60 MMHG

## 2021-10-13 DIAGNOSIS — R11.0 NAUSEA: Primary | ICD-10-CM

## 2021-10-13 DIAGNOSIS — K59.01 SLOW TRANSIT CONSTIPATION: ICD-10-CM

## 2021-10-13 DIAGNOSIS — Z23 FLU VACCINE NEED: ICD-10-CM

## 2021-10-13 DIAGNOSIS — R39.9 UTI SYMPTOMS: ICD-10-CM

## 2021-10-13 LAB
BILIRUB BLD-MCNC: ABNORMAL MG/DL
CLARITY, POC: CLEAR
COLOR UR: YELLOW
EXPIRATION DATE: ABNORMAL
GLUCOSE UR STRIP-MCNC: NEGATIVE MG/DL
KETONES UR QL: ABNORMAL
LEUKOCYTE EST, POC: NEGATIVE
Lab: ABNORMAL
NITRITE UR-MCNC: NEGATIVE MG/ML
PH UR: 6 [PH] (ref 5–8)
PROT UR STRIP-MCNC: ABNORMAL MG/DL
RBC # UR STRIP: NEGATIVE /UL
SP GR UR: 1.03 (ref 1–1.03)
UROBILINOGEN UR QL: NORMAL

## 2021-10-13 PROCEDURE — 81003 URINALYSIS AUTO W/O SCOPE: CPT | Performed by: NURSE PRACTITIONER

## 2021-10-13 PROCEDURE — 90662 IIV NO PRSV INCREASED AG IM: CPT | Performed by: NURSE PRACTITIONER

## 2021-10-13 PROCEDURE — G0008 ADMIN INFLUENZA VIRUS VAC: HCPCS | Performed by: NURSE PRACTITIONER

## 2021-10-13 PROCEDURE — 99214 OFFICE O/P EST MOD 30 MIN: CPT | Performed by: NURSE PRACTITIONER

## 2021-10-13 RX ORDER — ONDANSETRON 4 MG/1
4 TABLET, FILM COATED ORAL EVERY 8 HOURS PRN
Qty: 20 TABLET | Refills: 0 | Status: SHIPPED | OUTPATIENT
Start: 2021-10-13 | End: 2022-01-01

## 2021-10-13 RX ORDER — CILOSTAZOL 50 MG/1
50 TABLET ORAL 2 TIMES DAILY
COMMUNITY
End: 2022-01-01 | Stop reason: SDUPTHER

## 2021-10-13 NOTE — PROGRESS NOTES
"Chief Complaint  Constipation (for the past 3 days ) and Nausea / UTI (pt has not picked up the new antibiotic from pharm yet )    Subjective          Shirley Calhoun presents to [facility]  History of Present Illness  Constipation for the past 3 days, bloating and abdominal fullness.  Using Miralax for 2 days with no help  Nausea but no vomiting   Vomiting a lot last week  No hemorrhoids or bleeding from rectum.  Has been drinking a lot of Black cows with root beer with vanilla ice cream    UTI  Treated for UTI took Cipro, then told urine culture had yeast, was told to take Diflucan 200 mg daily for 14 days   Has not started the medication yet  Not sure if the UTI has cleared yet  No vaginal discharge or itching     Breathing is doing well  Taking Anoro daily  Continues to smoke; not ready to quit    PAD  Taking Pletal daily  Toes on foot go dark at times    Wants to get flu vaccine today  No fever or chills or URI symptoms    Objective   Vital Signs:   /60   Pulse 94   Temp 97.3 °F (36.3 °C)   Resp 24   Ht 152.4 cm (60\")   Wt 40.1 kg (88 lb 6.4 oz)   SpO2 97%   BMI 17.26 kg/m²     Physical Exam  Constitutional:       General: She is not in acute distress.     Appearance: She is not ill-appearing.   HENT:      Head: Normocephalic.   Cardiovascular:      Rate and Rhythm: Normal rate and regular rhythm.      Pulses: Normal pulses.      Heart sounds: Normal heart sounds.   Pulmonary:      Effort: Pulmonary effort is normal.   Abdominal:      General: Bowel sounds are normal. There is distension.      Palpations: Abdomen is soft.      Tenderness: There is no abdominal tenderness. There is no right CVA tenderness, left CVA tenderness, guarding or rebound.   Musculoskeletal:      Cervical back: Neck supple.   Skin:     General: Skin is warm and dry.      Coloration: Skin is pale.   Neurological:      Mental Status: She is alert and oriented to person, place, and time. "   Psychiatric:         Mood and Affect: Mood normal.         Behavior: Behavior normal.         Thought Content: Thought content normal.         Judgment: Judgment normal.        Result Review :                 Assessment and Plan    Diagnoses and all orders for this visit:    1. Nausea (Primary)  -     ondansetron (Zofran) 4 MG tablet; Take 1 tablet by mouth Every 8 (Eight) Hours As Needed for Nausea or Vomiting.  Dispense: 20 tablet; Refill: 0    2. Slow transit constipation  -     linaclotide (Linzess) 290 MCG capsule capsule; Take 1 capsule by mouth Every Morning Before Breakfast.  Dispense: 14 capsule; Refill: 0    3. UTI symptoms  -     POCT urinalysis dipstick, automated    4. Flu vaccine need  -     Fluzone High-Dose 65+yrs ([id number])        Follow Up   No follow-ups on file.  Patient was given instructions and counseling regarding her condition or for health maintenance advice. Please see specific information pulled into the AVS if appropriate.   Will give some Zofran for nausea  Avoid sugars cranberry juice and ice cream floats  UA positive for trace leuks will send for follow up culture  Take Linzess for constipation  If no BM and continues to have bloating/nausea/vomiting/abdominal pain go to ER. Pt agrees

## 2021-11-11 ENCOUNTER — OFFICE VISIT (OUTPATIENT)
Dept: FAMILY MEDICINE CLINIC | Facility: CLINIC | Age: 81
End: 2021-11-11

## 2021-11-11 VITALS
DIASTOLIC BLOOD PRESSURE: 60 MMHG | RESPIRATION RATE: 24 BRPM | TEMPERATURE: 98 F | HEIGHT: 60 IN | SYSTOLIC BLOOD PRESSURE: 120 MMHG | BODY MASS INDEX: 16.73 KG/M2 | HEART RATE: 90 BPM | OXYGEN SATURATION: 99 % | WEIGHT: 85.2 LBS

## 2021-11-11 DIAGNOSIS — J01.00 ACUTE NON-RECURRENT MAXILLARY SINUSITIS: Primary | ICD-10-CM

## 2021-11-11 DIAGNOSIS — R68.83 CHILLS: ICD-10-CM

## 2021-11-11 PROCEDURE — 99213 OFFICE O/P EST LOW 20 MIN: CPT | Performed by: NURSE PRACTITIONER

## 2021-11-11 RX ORDER — AMOXICILLIN AND CLAVULANATE POTASSIUM 875; 125 MG/1; MG/1
1 TABLET, FILM COATED ORAL 2 TIMES DAILY
Qty: 14 TABLET | Refills: 0 | Status: SHIPPED | OUTPATIENT
Start: 2021-11-11 | End: 2022-01-01

## 2021-11-11 RX ORDER — METHYLPREDNISOLONE 4 MG/1
TABLET ORAL
Qty: 21 TABLET | Refills: 0 | Status: SHIPPED | OUTPATIENT
Start: 2021-11-11 | End: 2022-01-01

## 2021-11-11 NOTE — PROGRESS NOTES
"Chief Complaint  hot flashes & fatigue (pt thinks she just has a cold )    Subjective          Shirley Calhoun presents to [facility]  History of Present Illness  Sinus congestion and facial pressure, thinks she has a sinus infection  Feeling hot flashes and fatigue; taking OTC cold medication for the past week  Breathing is doing well, no cough or wheezing using inhalers  Wants a COVID test   She will be visiting family for the holidays traveling   No fever or loss of taste or smell, no HA or weakness     Objective   Vital Signs:   /60   Pulse 90   Temp 98 °F (36.7 °C)   Resp 24   Ht 152.4 cm (60\")   Wt 38.6 kg (85 lb 3.2 oz)   SpO2 99%   BMI 16.64 kg/m²     Physical Exam  Vitals and nursing note reviewed.   Constitutional:       General: She is not in acute distress.     Appearance: She is well-developed.   HENT:      Head: Normocephalic.      Right Ear: Tympanic membrane, ear canal and external ear normal.      Left Ear: Tympanic membrane, ear canal and external ear normal.      Nose: Mucosal edema, congestion and rhinorrhea present.      Right Sinus: Maxillary sinus tenderness and frontal sinus tenderness present.      Left Sinus: Maxillary sinus tenderness and frontal sinus tenderness present.      Mouth/Throat:      Pharynx: Uvula midline. No oropharyngeal exudate or posterior oropharyngeal erythema.   Eyes:      Conjunctiva/sclera: Conjunctivae normal.   Cardiovascular:      Rate and Rhythm: Normal rate and regular rhythm.      Heart sounds: Normal heart sounds.   Pulmonary:      Effort: Pulmonary effort is normal.      Breath sounds: Normal breath sounds. No wheezing or rhonchi.   Musculoskeletal:      Cervical back: Neck supple.   Lymphadenopathy:      Cervical: No cervical adenopathy.   Skin:     General: Skin is warm and dry.      Findings: No rash.   Neurological:      Mental Status: She is alert and oriented to person, place, and time.   Psychiatric:         " Behavior: Behavior normal.         Thought Content: Thought content normal.         Judgment: Judgment normal.        Result Review :                 Assessment and Plan    Diagnoses and all orders for this visit:    1. Acute non-recurrent maxillary sinusitis (Primary)  -     amoxicillin-clavulanate (Augmentin) 875-125 MG per tablet; Take 1 tablet by mouth 2 (Two) Times a Day.  Dispense: 14 tablet; Refill: 0  -     methylPREDNISolone (MEDROL) 4 MG dose pack; Take as directed on package instructions.  Dispense: 21 tablet; Refill: 0    2. Chills  -     COVID-19,[facility] ROUTINE, NP/OP SWAB IN TRANSPORT MEDIA OR ESWAB 72 HR TAT - Swab, Anterior nasal; Future        Follow Up   No follow-ups on file.  Patient was given instructions and counseling regarding her condition or for health maintenance advice. Please see specific information pulled into the AVS if appropriate.   COVID test will take 24-48 hrs for results, will notify pt   Take Augmentin and Medrol dose matt as prescribed.   F/U as needed.

## 2021-11-12 LAB
LABCORP SARS-COV-2, NAA 2 DAY TAT: NORMAL
SARS-COV-2 RNA RESP QL NAA+PROBE: NOT DETECTED

## 2021-11-29 ENCOUNTER — TELEPHONE (OUTPATIENT)
Dept: FAMILY MEDICINE CLINIC | Facility: CLINIC | Age: 81
End: 2021-11-29

## 2021-11-29 DIAGNOSIS — J43.1 PANLOBULAR EMPHYSEMA (HCC): ICD-10-CM

## 2021-11-29 NOTE — TELEPHONE ENCOUNTER
This patient wants someone to call her about having a reaction to a nicotine patch also she wanted some clarification on the booster shot she said she has talked to her family and they told her not to get it so she wants someone to call her ASAP to talk to her about this. I also let her know about juan m leaving the practice which she wasn't happy about.

## 2021-11-30 NOTE — TELEPHONE ENCOUNTER
Called informed pt she stated she thinks she needs a lower dose of patch. She thinks it may be to strong for her. Also stated she needed a refill of Fluticasone-Umeclidin-Vilant 100-62.5-25 MCG/INH aerosol powder sent to the pharmacy.

## 2021-11-30 NOTE — TELEPHONE ENCOUNTER
Lara Kline, [facility] 4 hours ago (8:09 AM)     Redness and itching at site of Nicotine patch is common, people tend to tolerate the brand name Nicoderm CQ patches better but not always. Make sure alternating sites. It is up to her if she wants to get the Covid 19 booster, but it is recommended due to her age and chronic health issues. ajc

## 2021-12-01 NOTE — TELEPHONE ENCOUNTER
Informed pt but she still wants me to ask do you recommend her getting the Covid booster since she is so frail?     Also, did you send in the lower dose of the nicotine patch per her last message?

## 2021-12-02 NOTE — TELEPHONE ENCOUNTER
I said yes I recommend the booster and I don't know what dose Nicotine patch she currently has so no I did not lower it. What dose does she have?

## 2021-12-03 DIAGNOSIS — Z72.0 TOBACCO ABUSE: Primary | ICD-10-CM

## 2021-12-03 RX ORDER — NICOTINE 21 MG/24HR
1 PATCH, TRANSDERMAL 24 HOURS TRANSDERMAL EVERY 24 HOURS
Qty: 28 PATCH | Refills: 0 | Status: SHIPPED | OUTPATIENT
Start: 2021-12-03 | End: 2022-01-01 | Stop reason: ALTCHOICE

## 2022-01-01 ENCOUNTER — TELEPHONE (OUTPATIENT)
Dept: FAMILY MEDICINE CLINIC | Facility: CLINIC | Age: 82
End: 2022-01-01

## 2022-01-01 ENCOUNTER — TRANSITIONAL CARE MANAGEMENT TELEPHONE ENCOUNTER (OUTPATIENT)
Dept: CALL CENTER | Facility: HOSPITAL | Age: 82
End: 2022-01-01

## 2022-01-01 ENCOUNTER — HOME CARE VISIT (OUTPATIENT)
Dept: HOME HEALTH SERVICES | Facility: HOME HEALTHCARE | Age: 82
End: 2022-01-01

## 2022-01-01 ENCOUNTER — OFFICE VISIT (OUTPATIENT)
Dept: CARDIOLOGY | Facility: CLINIC | Age: 82
End: 2022-01-01

## 2022-01-01 ENCOUNTER — HOSPITAL ENCOUNTER (OUTPATIENT)
Dept: CT IMAGING | Facility: HOSPITAL | Age: 82
Discharge: HOME | End: 2022-11-09
Admitting: FAMILY MEDICINE

## 2022-01-01 ENCOUNTER — READMISSION MANAGEMENT (OUTPATIENT)
Dept: CALL CENTER | Facility: HOSPITAL | Age: 82
End: 2022-01-01

## 2022-01-01 ENCOUNTER — OFFICE VISIT (OUTPATIENT)
Dept: FAMILY MEDICINE CLINIC | Facility: CLINIC | Age: 82
End: 2022-01-01

## 2022-01-01 ENCOUNTER — TELEPHONE (OUTPATIENT)
Dept: CARDIOLOGY | Facility: CLINIC | Age: 82
End: 2022-01-01

## 2022-01-01 ENCOUNTER — PRE-ADMISSION TESTING (OUTPATIENT)
Dept: PREADMISSION TESTING | Facility: HOSPITAL | Age: 82
End: 2022-01-01

## 2022-01-01 ENCOUNTER — HOSPITAL ENCOUNTER (OUTPATIENT)
Facility: HOSPITAL | Age: 82
Setting detail: HOSPITAL OUTPATIENT SURGERY
Discharge: HOME | End: 2022-12-07
Attending: INTERNAL MEDICINE | Admitting: INTERNAL MEDICINE

## 2022-01-01 ENCOUNTER — NURSE NAVIGATOR (OUTPATIENT)
Dept: ONCOLOGY | Facility: CLINIC | Age: 82
End: 2022-01-01

## 2022-01-01 ENCOUNTER — APPOINTMENT (OUTPATIENT)
Dept: GENERAL RADIOLOGY | Facility: HOSPITAL | Age: 82
End: 2022-01-01

## 2022-01-01 ENCOUNTER — ANESTHESIA (OUTPATIENT)
Dept: PERIOP | Facility: HOSPITAL | Age: 82
End: 2022-01-01

## 2022-01-01 ENCOUNTER — PREP FOR SURGERY (OUTPATIENT)
Dept: OTHER | Facility: HOSPITAL | Age: 82
End: 2022-01-01

## 2022-01-01 ENCOUNTER — HOSPITAL ENCOUNTER (INPATIENT)
Facility: HOSPITAL | Age: 82
LOS: 3 days | Discharge: HOME HEALTH | End: 2022-09-03
Attending: SURGERY | Admitting: SURGERY

## 2022-01-01 ENCOUNTER — OFFICE VISIT (OUTPATIENT)
Dept: PULMONOLOGY | Facility: CLINIC | Age: 82
End: 2022-01-01

## 2022-01-01 ENCOUNTER — HOSPITAL ENCOUNTER (OUTPATIENT)
Dept: CT IMAGING | Facility: HOSPITAL | Age: 82
Discharge: HOME | End: 2022-03-24
Admitting: NURSE PRACTITIONER

## 2022-01-01 ENCOUNTER — ANESTHESIA EVENT (OUTPATIENT)
Dept: GASTROENTEROLOGY | Facility: HOSPITAL | Age: 82
End: 2022-01-01

## 2022-01-01 ENCOUNTER — HOME HEALTH ADMISSION (OUTPATIENT)
Dept: HOME HEALTH SERVICES | Facility: HOME HEALTHCARE | Age: 82
End: 2022-01-01

## 2022-01-01 ENCOUNTER — TELEPHONE (OUTPATIENT)
Dept: CARDIAC SURGERY | Facility: CLINIC | Age: 82
End: 2022-01-01

## 2022-01-01 ENCOUNTER — OFFICE VISIT (OUTPATIENT)
Dept: CARDIAC SURGERY | Facility: CLINIC | Age: 82
End: 2022-01-01

## 2022-01-01 ENCOUNTER — HOSPITAL ENCOUNTER (OUTPATIENT)
Dept: GENERAL RADIOLOGY | Facility: HOSPITAL | Age: 82
Discharge: HOME | End: 2022-10-25
Admitting: FAMILY MEDICINE

## 2022-01-01 ENCOUNTER — HOSPITAL ENCOUNTER (OUTPATIENT)
Dept: GENERAL RADIOLOGY | Facility: HOSPITAL | Age: 82
Discharge: HOME | End: 2022-09-12
Admitting: PHYSICIAN ASSISTANT

## 2022-01-01 ENCOUNTER — ANESTHESIA EVENT (OUTPATIENT)
Dept: PERIOP | Facility: HOSPITAL | Age: 82
End: 2022-01-01

## 2022-01-01 ENCOUNTER — ANESTHESIA (OUTPATIENT)
Dept: GASTROENTEROLOGY | Facility: HOSPITAL | Age: 82
End: 2022-01-01

## 2022-01-01 VITALS
WEIGHT: 86 LBS | HEART RATE: 83 BPM | TEMPERATURE: 97.6 F | BODY MASS INDEX: 16.88 KG/M2 | RESPIRATION RATE: 18 BRPM | DIASTOLIC BLOOD PRESSURE: 53 MMHG | OXYGEN SATURATION: 97 % | HEIGHT: 60 IN | SYSTOLIC BLOOD PRESSURE: 105 MMHG

## 2022-01-01 VITALS
TEMPERATURE: 97.2 F | DIASTOLIC BLOOD PRESSURE: 74 MMHG | OXYGEN SATURATION: 100 % | RESPIRATION RATE: 20 BRPM | SYSTOLIC BLOOD PRESSURE: 105 MMHG | HEART RATE: 81 BPM

## 2022-01-01 VITALS
SYSTOLIC BLOOD PRESSURE: 114 MMHG | OXYGEN SATURATION: 98 % | BODY MASS INDEX: 17.71 KG/M2 | HEIGHT: 60 IN | WEIGHT: 90.2 LBS | TEMPERATURE: 97.8 F | HEART RATE: 110 BPM | DIASTOLIC BLOOD PRESSURE: 70 MMHG

## 2022-01-01 VITALS
BODY MASS INDEX: 16.69 KG/M2 | HEIGHT: 60 IN | SYSTOLIC BLOOD PRESSURE: 128 MMHG | HEART RATE: 86 BPM | DIASTOLIC BLOOD PRESSURE: 60 MMHG | OXYGEN SATURATION: 96 % | WEIGHT: 85 LBS

## 2022-01-01 VITALS
DIASTOLIC BLOOD PRESSURE: 70 MMHG | WEIGHT: 85.8 LBS | BODY MASS INDEX: 16.76 KG/M2 | HEART RATE: 88 BPM | TEMPERATURE: 99.1 F | SYSTOLIC BLOOD PRESSURE: 104 MMHG | OXYGEN SATURATION: 99 %

## 2022-01-01 VITALS
OXYGEN SATURATION: 98 % | HEIGHT: 60 IN | WEIGHT: 88.2 LBS | TEMPERATURE: 98.4 F | HEART RATE: 91 BPM | SYSTOLIC BLOOD PRESSURE: 104 MMHG | DIASTOLIC BLOOD PRESSURE: 64 MMHG | BODY MASS INDEX: 17.32 KG/M2 | RESPIRATION RATE: 18 BRPM

## 2022-01-01 VITALS
SYSTOLIC BLOOD PRESSURE: 100 MMHG | OXYGEN SATURATION: 98 % | DIASTOLIC BLOOD PRESSURE: 80 MMHG | RESPIRATION RATE: 16 BRPM | HEART RATE: 68 BPM

## 2022-01-01 VITALS
HEART RATE: 92 BPM | OXYGEN SATURATION: 97 % | SYSTOLIC BLOOD PRESSURE: 120 MMHG | RESPIRATION RATE: 16 BRPM | DIASTOLIC BLOOD PRESSURE: 70 MMHG

## 2022-01-01 VITALS
DIASTOLIC BLOOD PRESSURE: 68 MMHG | TEMPERATURE: 97.1 F | OXYGEN SATURATION: 94 % | HEART RATE: 80 BPM | SYSTOLIC BLOOD PRESSURE: 113 MMHG

## 2022-01-01 VITALS
OXYGEN SATURATION: 98 % | DIASTOLIC BLOOD PRESSURE: 84 MMHG | HEART RATE: 93 BPM | SYSTOLIC BLOOD PRESSURE: 142 MMHG | TEMPERATURE: 97.8 F

## 2022-01-01 VITALS — WEIGHT: 86.42 LBS | HEIGHT: 60 IN | BODY MASS INDEX: 16.97 KG/M2

## 2022-01-01 VITALS
RESPIRATION RATE: 18 BRPM | TEMPERATURE: 97.7 F | OXYGEN SATURATION: 93 % | DIASTOLIC BLOOD PRESSURE: 65 MMHG | SYSTOLIC BLOOD PRESSURE: 114 MMHG | HEART RATE: 78 BPM

## 2022-01-01 VITALS
RESPIRATION RATE: 22 BRPM | OXYGEN SATURATION: 98 % | DIASTOLIC BLOOD PRESSURE: 54 MMHG | HEART RATE: 106 BPM | TEMPERATURE: 98.2 F | SYSTOLIC BLOOD PRESSURE: 132 MMHG | BODY MASS INDEX: 16.69 KG/M2 | WEIGHT: 85 LBS | HEIGHT: 60 IN

## 2022-01-01 VITALS
HEART RATE: 96 BPM | RESPIRATION RATE: 16 BRPM | OXYGEN SATURATION: 96 % | BODY MASS INDEX: 17.87 KG/M2 | WEIGHT: 91 LBS | DIASTOLIC BLOOD PRESSURE: 48 MMHG | HEIGHT: 60 IN | TEMPERATURE: 98 F | SYSTOLIC BLOOD PRESSURE: 122 MMHG

## 2022-01-01 VITALS
SYSTOLIC BLOOD PRESSURE: 122 MMHG | HEART RATE: 84 BPM | OXYGEN SATURATION: 97 % | BODY MASS INDEX: 16.76 KG/M2 | TEMPERATURE: 97.6 F | HEIGHT: 60 IN | DIASTOLIC BLOOD PRESSURE: 78 MMHG

## 2022-01-01 VITALS
DIASTOLIC BLOOD PRESSURE: 59 MMHG | TEMPERATURE: 97.8 F | OXYGEN SATURATION: 96 % | SYSTOLIC BLOOD PRESSURE: 150 MMHG | HEIGHT: 61 IN | HEART RATE: 60 BPM | BODY MASS INDEX: 16.99 KG/M2 | WEIGHT: 90 LBS

## 2022-01-01 VITALS — WEIGHT: 86.18 LBS | BODY MASS INDEX: 16.92 KG/M2 | HEIGHT: 60 IN

## 2022-01-01 DIAGNOSIS — F51.01 PRIMARY INSOMNIA: ICD-10-CM

## 2022-01-01 DIAGNOSIS — M79.605 BILATERAL LEG PAIN: ICD-10-CM

## 2022-01-01 DIAGNOSIS — M79.604 BILATERAL LEG PAIN: ICD-10-CM

## 2022-01-01 DIAGNOSIS — R91.1 NODULE OF UPPER LOBE OF RIGHT LUNG: Primary | ICD-10-CM

## 2022-01-01 DIAGNOSIS — J43.1 PANLOBULAR EMPHYSEMA: ICD-10-CM

## 2022-01-01 DIAGNOSIS — R05.1 ACUTE COUGH: ICD-10-CM

## 2022-01-01 DIAGNOSIS — R91.8 MASS OF UPPER LOBE OF RIGHT LUNG: ICD-10-CM

## 2022-01-01 DIAGNOSIS — I73.9 PAD (PERIPHERAL ARTERY DISEASE): Primary | ICD-10-CM

## 2022-01-01 DIAGNOSIS — R91.1 LUNG NODULE: Primary | ICD-10-CM

## 2022-01-01 DIAGNOSIS — R91.1 LUNG NODULE: ICD-10-CM

## 2022-01-01 DIAGNOSIS — I25.10 CORONARY ARTERY DISEASE INVOLVING NATIVE CORONARY ARTERY OF NATIVE HEART WITHOUT ANGINA PECTORIS: Primary | ICD-10-CM

## 2022-01-01 DIAGNOSIS — G89.29 CHRONIC MIDLINE LOW BACK PAIN WITHOUT SCIATICA: ICD-10-CM

## 2022-01-01 DIAGNOSIS — M79.606 ISCHEMIC REST PAIN OF LOWER EXTREMITY: ICD-10-CM

## 2022-01-01 DIAGNOSIS — R91.1 NODULE OF UPPER LOBE OF RIGHT LUNG: ICD-10-CM

## 2022-01-01 DIAGNOSIS — I70.423 ATHEROSCLEROSIS OF AUTOLOGOUS VEIN BYPASS GRAFT(S) OF THE EXTREMITIES WITH REST PAIN, BILATERAL LEGS: ICD-10-CM

## 2022-01-01 DIAGNOSIS — I25.10 CORONARY ARTERY DISEASE DUE TO LIPID RICH PLAQUE: ICD-10-CM

## 2022-01-01 DIAGNOSIS — Z74.09 IMPAIRED FUNCTIONAL MOBILITY, BALANCE, GAIT, AND ENDURANCE: ICD-10-CM

## 2022-01-01 DIAGNOSIS — I99.8 ISCHEMIC REST PAIN OF LOWER EXTREMITY: ICD-10-CM

## 2022-01-01 DIAGNOSIS — D64.9 ANEMIA, UNSPECIFIED TYPE: ICD-10-CM

## 2022-01-01 DIAGNOSIS — S46.812A STRAIN OF LEFT TRAPEZIUS MUSCLE, INITIAL ENCOUNTER: Primary | ICD-10-CM

## 2022-01-01 DIAGNOSIS — M54.50 CHRONIC MIDLINE LOW BACK PAIN WITHOUT SCIATICA: ICD-10-CM

## 2022-01-01 DIAGNOSIS — Z72.0 TOBACCO ABUSE: ICD-10-CM

## 2022-01-01 DIAGNOSIS — I73.9 PAD (PERIPHERAL ARTERY DISEASE): ICD-10-CM

## 2022-01-01 DIAGNOSIS — R05.1 ACUTE COUGH: Primary | ICD-10-CM

## 2022-01-01 DIAGNOSIS — I25.83 CORONARY ARTERY DISEASE DUE TO LIPID RICH PLAQUE: ICD-10-CM

## 2022-01-01 DIAGNOSIS — J44.9 CHRONIC OBSTRUCTIVE PULMONARY DISEASE, UNSPECIFIED COPD TYPE: ICD-10-CM

## 2022-01-01 DIAGNOSIS — R50.9 FEVER, UNSPECIFIED FEVER CAUSE: ICD-10-CM

## 2022-01-01 DIAGNOSIS — I74.09 CHRONIC DISTAL AORTIC OCCLUSION: Primary | ICD-10-CM

## 2022-01-01 DIAGNOSIS — E55.9 VITAMIN D DEFICIENCY: ICD-10-CM

## 2022-01-01 DIAGNOSIS — E87.1 HYPONATREMIA: ICD-10-CM

## 2022-01-01 DIAGNOSIS — F32.1 CURRENT MODERATE EPISODE OF MAJOR DEPRESSIVE DISORDER WITHOUT PRIOR EPISODE: ICD-10-CM

## 2022-01-01 DIAGNOSIS — R63.0 LOSS OF APPETITE: ICD-10-CM

## 2022-01-01 LAB
25(OH)D3+25(OH)D2 SERPL-MCNC: 44.5 NG/ML (ref 30–100)
ALBUMIN SERPL-MCNC: 4 G/DL (ref 3.6–4.6)
ALBUMIN SERPL-MCNC: 4.1 G/DL (ref 3.5–5.2)
ALBUMIN SERPL-MCNC: 4.3 G/DL (ref 3.5–5.2)
ALBUMIN/GLOB SERPL: 1.6 {RATIO} (ref 1.2–2.2)
ALBUMIN/GLOB SERPL: 1.9 G/DL
ALBUMIN/GLOB SERPL: 2 G/DL
ALP SERPL-CCNC: 106 IU/L (ref 44–121)
ALP SERPL-CCNC: 112 U/L (ref 39–117)
ALP SERPL-CCNC: 97 U/L (ref 39–117)
ALT SERPL W P-5'-P-CCNC: 13 U/L (ref 1–33)
ALT SERPL-CCNC: 20 U/L (ref 1–33)
ALT SERPL-CCNC: 23 IU/L (ref 0–32)
ANION GAP SERPL CALCULATED.3IONS-SCNC: 10 MMOL/L (ref 5–15)
ANION GAP SERPL CALCULATED.3IONS-SCNC: 9 MMOL/L (ref 5–15)
APTT PPP: 35.6 SECONDS (ref 22–39)
AST SERPL-CCNC: 27 U/L (ref 1–32)
AST SERPL-CCNC: 39 IU/L (ref 0–40)
AST SERPL-CCNC: 39 U/L (ref 1–32)
BACTERIA SPEC RESP CULT: NO GROWTH
BASOPHILS # BLD AUTO: 0 X10E3/UL (ref 0–0.2)
BASOPHILS # BLD AUTO: 0.02 10*3/MM3 (ref 0–0.2)
BASOPHILS # BLD AUTO: 0.02 10*3/MM3 (ref 0–0.2)
BASOPHILS # BLD AUTO: 0.04 10*3/MM3 (ref 0–0.2)
BASOPHILS NFR BLD AUTO: 0 %
BASOPHILS NFR BLD AUTO: 0.4 % (ref 0–1.5)
BASOPHILS NFR BLD AUTO: 0.4 % (ref 0–1.5)
BASOPHILS NFR BLD AUTO: 0.8 % (ref 0–1.5)
BILIRUB SERPL-MCNC: 0.3 MG/DL (ref 0–1.2)
BILIRUB SERPL-MCNC: 0.3 MG/DL (ref 0–1.2)
BILIRUB SERPL-MCNC: 0.6 MG/DL (ref 0–1.2)
BUN SERPL-MCNC: 10 MG/DL (ref 8–23)
BUN SERPL-MCNC: 11 MG/DL (ref 8–23)
BUN SERPL-MCNC: 15 MG/DL (ref 8–27)
BUN SERPL-MCNC: 17 MG/DL (ref 8–23)
BUN SERPL-MCNC: 17 MG/DL (ref 8–27)
BUN SERPL-MCNC: 25 MG/DL (ref 8–23)
BUN SERPL-MCNC: 9 MG/DL (ref 8–23)
BUN/CREAT SERPL: 20.8 (ref 7–25)
BUN/CREAT SERPL: 21.4 (ref 7–25)
BUN/CREAT SERPL: 24.4 (ref 7–25)
BUN/CREAT SERPL: 25 (ref 12–28)
BUN/CREAT SERPL: 28 (ref 12–28)
BUN/CREAT SERPL: 31.5 (ref 7–25)
BUN/CREAT SERPL: 49 (ref 7–25)
CALCIUM SERPL-MCNC: 9.2 MG/DL (ref 8.7–10.3)
CALCIUM SERPL-MCNC: 9.3 MG/DL (ref 8.6–10.5)
CALCIUM SERPL-MCNC: 9.4 MG/DL (ref 8.7–10.3)
CALCIUM SPEC-SCNC: 8.4 MG/DL (ref 8.6–10.5)
CALCIUM SPEC-SCNC: 8.7 MG/DL (ref 8.6–10.5)
CALCIUM SPEC-SCNC: 9.7 MG/DL (ref 8.6–10.5)
CALCIUM SPEC-SCNC: 9.8 MG/DL (ref 8.6–10.5)
CHLORIDE SERPL-SCNC: 100 MMOL/L (ref 98–107)
CHLORIDE SERPL-SCNC: 101 MMOL/L (ref 96–106)
CHLORIDE SERPL-SCNC: 101 MMOL/L (ref 98–107)
CHLORIDE SERPL-SCNC: 101 MMOL/L (ref 98–107)
CHLORIDE SERPL-SCNC: 102 MMOL/L (ref 98–107)
CHLORIDE SERPL-SCNC: 99 MMOL/L (ref 96–106)
CHLORIDE SERPL-SCNC: 99 MMOL/L (ref 98–107)
CHOLEST SERPL-MCNC: 165 MG/DL (ref 100–199)
CO2 SERPL-SCNC: 24 MMOL/L (ref 20–29)
CO2 SERPL-SCNC: 24.7 MMOL/L (ref 22–29)
CO2 SERPL-SCNC: 25 MMOL/L (ref 20–29)
CO2 SERPL-SCNC: 25 MMOL/L (ref 22–29)
CO2 SERPL-SCNC: 26 MMOL/L (ref 22–29)
CO2 SERPL-SCNC: 28 MMOL/L (ref 22–29)
CO2 SERPL-SCNC: 28 MMOL/L (ref 22–29)
CREAT BLDA-MCNC: 0.6 MG/DL (ref 0.6–1.3)
CREAT SERPL-MCNC: 0.42 MG/DL (ref 0.57–1)
CREAT SERPL-MCNC: 0.45 MG/DL (ref 0.57–1)
CREAT SERPL-MCNC: 0.48 MG/DL (ref 0.57–1)
CREAT SERPL-MCNC: 0.51 MG/DL (ref 0.57–1)
CREAT SERPL-MCNC: 0.54 MG/DL (ref 0.57–1)
CREAT SERPL-MCNC: 0.59 MG/DL (ref 0.57–1)
CREAT SERPL-MCNC: 0.6 MG/DL (ref 0.57–1)
CYTO UR: NORMAL
DEPRECATED RDW RBC AUTO: 46.5 FL (ref 37–54)
DEPRECATED RDW RBC AUTO: 47.1 FL (ref 37–54)
DEPRECATED RDW RBC AUTO: 49.1 FL (ref 37–54)
DEPRECATED RDW RBC AUTO: 60.8 FL (ref 37–54)
EGFRCR SERPLBLD CKD-EPI 2021: 90 ML/MIN/1.73
EGFRCR SERPLBLD CKD-EPI 2021: 92.1 ML/MIN/1.73
EGFRCR SERPLBLD CKD-EPI 2021: 93.3 ML/MIN/1.73
EGFRCR SERPLBLD CKD-EPI 2021: 94.7 ML/MIN/1.73
EGFRCR SERPLBLD CKD-EPI 2021: 96.2 ML/MIN/1.73
EGFRCR SERPLBLD CKD-EPI 2021: 97.8 ML/MIN/1.73
EGFRCR-CYS SERPLBLD CKD-EPI 2021: 90 ML/MIN/1.73
EOSINOPHIL # BLD AUTO: 0.1 10*3/MM3 (ref 0–0.4)
EOSINOPHIL # BLD AUTO: 0.1 X10E3/UL (ref 0–0.4)
EOSINOPHIL # BLD AUTO: 0.11 10*3/MM3 (ref 0–0.4)
EOSINOPHIL # BLD AUTO: 0.12 10*3/MM3 (ref 0–0.4)
EOSINOPHIL NFR BLD AUTO: 1 %
EOSINOPHIL NFR BLD AUTO: 2 % (ref 0.3–6.2)
EOSINOPHIL NFR BLD AUTO: 2.3 % (ref 0.3–6.2)
EOSINOPHIL NFR BLD AUTO: 2.4 % (ref 0.3–6.2)
ERYTHROCYTE [DISTWIDTH] IN BLOOD BY AUTOMATED COUNT: 12.2 % (ref 12.3–15.4)
ERYTHROCYTE [DISTWIDTH] IN BLOOD BY AUTOMATED COUNT: 12.4 % (ref 11.7–15.4)
ERYTHROCYTE [DISTWIDTH] IN BLOOD BY AUTOMATED COUNT: 12.5 % (ref 12.3–15.4)
ERYTHROCYTE [DISTWIDTH] IN BLOOD BY AUTOMATED COUNT: 12.6 % (ref 12.3–15.4)
ERYTHROCYTE [DISTWIDTH] IN BLOOD BY AUTOMATED COUNT: 12.6 % (ref 12.3–15.4)
ERYTHROCYTE [DISTWIDTH] IN BLOOD BY AUTOMATED COUNT: 15.7 % (ref 12.3–15.4)
FOLATE SERPL-MCNC: 11.4 NG/ML
GIE STN SPEC: NORMAL
GLOBULIN SER CALC-MCNC: 2.1 GM/DL
GLOBULIN SER CALC-MCNC: 2.5 G/DL (ref 1.5–4.5)
GLOBULIN UR ELPH-MCNC: 2.3 GM/DL
GLUCOSE SERPL-MCNC: 103 MG/DL (ref 65–99)
GLUCOSE SERPL-MCNC: 106 MG/DL (ref 65–99)
GLUCOSE SERPL-MCNC: 116 MG/DL (ref 65–99)
GLUCOSE SERPL-MCNC: 122 MG/DL (ref 65–99)
GLUCOSE SERPL-MCNC: 157 MG/DL (ref 65–99)
GLUCOSE SERPL-MCNC: 87 MG/DL (ref 65–99)
GLUCOSE SERPL-MCNC: 88 MG/DL (ref 65–99)
GRAM STN SPEC: NORMAL
HBA1C MFR BLD: 4.5 % (ref 4.8–5.6)
HCT VFR BLD AUTO: 24.7 % (ref 34–46.6)
HCT VFR BLD AUTO: 26 % (ref 34–46.6)
HCT VFR BLD AUTO: 32.1 % (ref 34–46.6)
HCT VFR BLD AUTO: 32.6 % (ref 34–46.6)
HCT VFR BLD AUTO: 34.7 % (ref 34–46.6)
HCT VFR BLD AUTO: 37.5 % (ref 34–46.6)
HDLC SERPL-MCNC: 57 MG/DL
HGB BLD-MCNC: 10.4 G/DL (ref 12–15.9)
HGB BLD-MCNC: 10.9 G/DL (ref 12–15.9)
HGB BLD-MCNC: 11.8 G/DL (ref 12–15.9)
HGB BLD-MCNC: 13 G/DL (ref 11.1–15.9)
HGB BLD-MCNC: 8.6 G/DL (ref 12–15.9)
HGB BLD-MCNC: 8.8 G/DL (ref 12–15.9)
IMM GRANULOCYTES # BLD AUTO: 0 X10E3/UL (ref 0–0.1)
IMM GRANULOCYTES # BLD AUTO: 0.02 10*3/MM3 (ref 0–0.05)
IMM GRANULOCYTES # BLD AUTO: 0.03 10*3/MM3 (ref 0–0.05)
IMM GRANULOCYTES # BLD AUTO: 0.03 10*3/MM3 (ref 0–0.05)
IMM GRANULOCYTES NFR BLD AUTO: 0 %
IMM GRANULOCYTES NFR BLD AUTO: 0.4 % (ref 0–0.5)
IMM GRANULOCYTES NFR BLD AUTO: 0.6 % (ref 0–0.5)
IMM GRANULOCYTES NFR BLD AUTO: 0.6 % (ref 0–0.5)
INR PPP: 0.92 (ref 0.84–1.13)
INR PPP: 0.98 (ref 0.84–1.13)
IRON SATN MFR SERPL: 21 % (ref 15–55)
IRON SERPL-MCNC: 71 UG/DL (ref 27–139)
LAB AP CASE REPORT: NORMAL
LAB AP CLINICAL INFORMATION: NORMAL
LDLC SERPL CALC-MCNC: 92 MG/DL (ref 0–99)
LYMPHOCYTES # BLD AUTO: 0.21 10*3/MM3 (ref 0.7–3.1)
LYMPHOCYTES # BLD AUTO: 0.26 10*3/MM3 (ref 0.7–3.1)
LYMPHOCYTES # BLD AUTO: 0.4 X10E3/UL (ref 0.7–3.1)
LYMPHOCYTES # BLD AUTO: 0.53 10*3/MM3 (ref 0.7–3.1)
LYMPHOCYTES NFR BLD AUTO: 11.3 % (ref 19.6–45.3)
LYMPHOCYTES NFR BLD AUTO: 4.2 % (ref 19.6–45.3)
LYMPHOCYTES NFR BLD AUTO: 5.1 % (ref 19.6–45.3)
LYMPHOCYTES NFR BLD AUTO: 6 %
MCH RBC QN AUTO: 34.4 PG (ref 26.6–33)
MCH RBC QN AUTO: 34.4 PG (ref 26.6–33)
MCH RBC QN AUTO: 35.5 PG (ref 26.6–33)
MCH RBC QN AUTO: 35.6 PG (ref 26.6–33)
MCH RBC QN AUTO: 35.8 PG (ref 26.6–33)
MCH RBC QN AUTO: 35.9 PG (ref 26.6–33)
MCHC RBC AUTO-ENTMCNC: 32.4 G/DL (ref 31.5–35.7)
MCHC RBC AUTO-ENTMCNC: 33.4 G/DL (ref 31.5–35.7)
MCHC RBC AUTO-ENTMCNC: 33.8 G/DL (ref 31.5–35.7)
MCHC RBC AUTO-ENTMCNC: 34 G/DL (ref 31.5–35.7)
MCHC RBC AUTO-ENTMCNC: 34.7 G/DL (ref 31.5–35.7)
MCHC RBC AUTO-ENTMCNC: 34.8 G/DL (ref 31.5–35.7)
MCV RBC AUTO: 102.8 FL (ref 79–97)
MCV RBC AUTO: 102.9 FL (ref 79–97)
MCV RBC AUTO: 103 FL (ref 79–97)
MCV RBC AUTO: 105.3 FL (ref 79–97)
MCV RBC AUTO: 105.5 FL (ref 79–97)
MCV RBC AUTO: 106.3 FL (ref 79–97)
MONOCYTES # BLD AUTO: 0.7 10*3/MM3 (ref 0.1–0.9)
MONOCYTES # BLD AUTO: 0.7 X10E3/UL (ref 0.1–0.9)
MONOCYTES # BLD AUTO: 0.82 10*3/MM3 (ref 0.1–0.9)
MONOCYTES # BLD AUTO: 0.84 10*3/MM3 (ref 0.1–0.9)
MONOCYTES NFR BLD AUTO: 11 %
MONOCYTES NFR BLD AUTO: 13.9 % (ref 5–12)
MONOCYTES NFR BLD AUTO: 16.6 % (ref 5–12)
MONOCYTES NFR BLD AUTO: 17.4 % (ref 5–12)
NEUTROPHILS # BLD AUTO: 3.21 10*3/MM3 (ref 1.7–7)
NEUTROPHILS # BLD AUTO: 5.1 X10E3/UL (ref 1.4–7)
NEUTROPHILS NFR BLD AUTO: 3.8 10*3/MM3 (ref 1.7–7)
NEUTROPHILS NFR BLD AUTO: 3.94 10*3/MM3 (ref 1.7–7)
NEUTROPHILS NFR BLD AUTO: 68.2 % (ref 42.7–76)
NEUTROPHILS NFR BLD AUTO: 74.9 % (ref 42.7–76)
NEUTROPHILS NFR BLD AUTO: 78.5 % (ref 42.7–76)
NEUTROPHILS NFR BLD AUTO: 82 %
NRBC BLD AUTO-RTO: 0 /100 WBC (ref 0–0.2)
PATH REPORT.FINAL DX SPEC: NORMAL
PATH REPORT.GROSS SPEC: NORMAL
PLATELET # BLD AUTO: 210 10*3/MM3 (ref 140–450)
PLATELET # BLD AUTO: 243 10*3/MM3 (ref 140–450)
PLATELET # BLD AUTO: 298 10*3/MM3 (ref 140–450)
PLATELET # BLD AUTO: 298 X10E3/UL (ref 150–450)
PLATELET # BLD AUTO: 317 10*3/MM3 (ref 140–450)
PLATELET # BLD AUTO: 406 10*3/MM3 (ref 140–450)
PMV BLD AUTO: 9.1 FL (ref 6–12)
PMV BLD AUTO: 9.2 FL (ref 6–12)
PMV BLD AUTO: 9.4 FL (ref 6–12)
PMV BLD AUTO: 9.5 FL (ref 6–12)
POTASSIUM SERPL-SCNC: 3.5 MMOL/L (ref 3.5–5.2)
POTASSIUM SERPL-SCNC: 3.7 MMOL/L (ref 3.5–5.2)
POTASSIUM SERPL-SCNC: 3.7 MMOL/L (ref 3.5–5.2)
POTASSIUM SERPL-SCNC: 4.2 MMOL/L (ref 3.5–5.2)
POTASSIUM SERPL-SCNC: 4.3 MMOL/L (ref 3.5–5.2)
POTASSIUM SERPL-SCNC: 4.4 MMOL/L (ref 3.5–5.2)
POTASSIUM SERPL-SCNC: 4.5 MMOL/L (ref 3.5–5.2)
PROT SERPL-MCNC: 6.2 G/DL (ref 6–8.5)
PROT SERPL-MCNC: 6.5 G/DL (ref 6–8.5)
PROT SERPL-MCNC: 6.6 G/DL (ref 6–8.5)
PROTHROMBIN TIME: 12.3 SECONDS (ref 11.4–14.4)
PROTHROMBIN TIME: 12.9 SECONDS (ref 11.4–14.4)
QT INTERVAL: 416 MS
QTC INTERVAL: 477 MS
RBC # BLD AUTO: 2.4 10*6/MM3 (ref 3.77–5.28)
RBC # BLD AUTO: 2.47 10*6/MM3 (ref 3.77–5.28)
RBC # BLD AUTO: 3.02 10*6/MM3 (ref 3.77–5.28)
RBC # BLD AUTO: 3.17 10*6/MM3 (ref 3.77–5.28)
RBC # BLD AUTO: 3.29 10*6/MM3 (ref 3.77–5.28)
RBC # BLD AUTO: 3.66 X10E6/UL (ref 3.77–5.28)
REF LAB TEST METHOD: NORMAL
SODIUM SERPL-SCNC: 135 MMOL/L (ref 136–145)
SODIUM SERPL-SCNC: 135 MMOL/L (ref 136–145)
SODIUM SERPL-SCNC: 138 MMOL/L (ref 134–144)
SODIUM SERPL-SCNC: 138 MMOL/L (ref 136–145)
SODIUM SERPL-SCNC: 139 MMOL/L (ref 136–145)
SODIUM SERPL-SCNC: 139 MMOL/L (ref 136–145)
SODIUM SERPL-SCNC: 142 MMOL/L (ref 134–144)
TIBC SERPL-MCNC: 335 UG/DL (ref 250–450)
TRIGL SERPL-MCNC: 87 MG/DL (ref 0–149)
UIBC SERPL-MCNC: 264 UG/DL (ref 118–369)
VIT B12 SERPL-MCNC: >2000 PG/ML (ref 232–1245)
VLDLC SERPL CALC-MCNC: 16 MG/DL (ref 5–40)
WBC # BLD AUTO: 4.71 10*3/MM3 (ref 3.4–10.8)
WBC # BLD AUTO: 6.2 X10E3/UL (ref 3.4–10.8)
WBC NRBC COR # BLD: 5.02 10*3/MM3 (ref 3.4–10.8)
WBC NRBC COR # BLD: 5.07 10*3/MM3 (ref 3.4–10.8)
WBC NRBC COR # BLD: 5.28 10*3/MM3 (ref 3.4–10.8)
WBC NRBC COR # BLD: 6.62 10*3/MM3 (ref 3.4–10.8)

## 2022-01-01 PROCEDURE — 99214 OFFICE O/P EST MOD 30 MIN: CPT | Performed by: INTERNAL MEDICINE

## 2022-01-01 PROCEDURE — 25010000002 DEXAMETHASONE PER 1 MG: Performed by: NURSE ANESTHETIST, CERTIFIED REGISTERED

## 2022-01-01 PROCEDURE — 71046 X-RAY EXAM CHEST 2 VIEWS: CPT

## 2022-01-01 PROCEDURE — 85610 PROTHROMBIN TIME: CPT

## 2022-01-01 PROCEDURE — 94060 EVALUATION OF WHEEZING: CPT | Performed by: INTERNAL MEDICINE

## 2022-01-01 PROCEDURE — 25010000002 HEPARIN (PORCINE) PER 1000 UNITS

## 2022-01-01 PROCEDURE — 25010000002 ENOXAPARIN PER 10 MG: Performed by: SURGERY

## 2022-01-01 PROCEDURE — C1874 STENT, COATED/COV W/DEL SYS: HCPCS | Performed by: SURGERY

## 2022-01-01 PROCEDURE — 25010000002 FENTANYL CITRATE (PF) 50 MCG/ML SOLUTION

## 2022-01-01 PROCEDURE — 87205 SMEAR GRAM STAIN: CPT | Performed by: INTERNAL MEDICINE

## 2022-01-01 PROCEDURE — 85027 COMPLETE CBC AUTOMATED: CPT

## 2022-01-01 PROCEDURE — C1769 GUIDE WIRE: HCPCS | Performed by: SURGERY

## 2022-01-01 PROCEDURE — 047C3DZ DILATION OF RIGHT COMMON ILIAC ARTERY WITH INTRALUMINAL DEVICE, PERCUTANEOUS APPROACH: ICD-10-PCS | Performed by: SURGERY

## 2022-01-01 PROCEDURE — C1726 CATH, BAL DIL, NON-VASCULAR: HCPCS | Performed by: INTERNAL MEDICINE

## 2022-01-01 PROCEDURE — C1894 INTRO/SHEATH, NON-LASER: HCPCS | Performed by: SURGERY

## 2022-01-01 PROCEDURE — 97162 PT EVAL MOD COMPLEX 30 MIN: CPT

## 2022-01-01 PROCEDURE — 047D3DZ DILATION OF LEFT COMMON ILIAC ARTERY WITH INTRALUMINAL DEVICE, PERCUTANEOUS APPROACH: ICD-10-PCS | Performed by: SURGERY

## 2022-01-01 PROCEDURE — 80053 COMPREHEN METABOLIC PANEL: CPT

## 2022-01-01 PROCEDURE — 25010000002 HEPARIN (PORCINE) PER 1000 UNITS: Performed by: SURGERY

## 2022-01-01 PROCEDURE — 25010000002 NEOSTIGMINE 10 MG/10ML SOLUTION: Performed by: NURSE ANESTHETIST, CERTIFIED REGISTERED

## 2022-01-01 PROCEDURE — 93010 ELECTROCARDIOGRAM REPORT: CPT | Performed by: INTERNAL MEDICINE

## 2022-01-01 PROCEDURE — 25010000002 CEFAZOLIN IN DEXTROSE 2-4 GM/100ML-% SOLUTION: Performed by: SURGERY

## 2022-01-01 PROCEDURE — 97166 OT EVAL MOD COMPLEX 45 MIN: CPT

## 2022-01-01 PROCEDURE — 85730 THROMBOPLASTIN TIME PARTIAL: CPT

## 2022-01-01 PROCEDURE — G0299 HHS/HOSPICE OF RN EA 15 MIN: HCPCS

## 2022-01-01 PROCEDURE — 0 LIDOCAINE 1 % SOLUTION: Performed by: SURGERY

## 2022-01-01 PROCEDURE — 36415 COLL VENOUS BLD VENIPUNCTURE: CPT

## 2022-01-01 PROCEDURE — B41D1ZZ FLUOROSCOPY OF AORTA AND BILATERAL LOWER EXTREMITY ARTERIES USING LOW OSMOLAR CONTRAST: ICD-10-PCS | Performed by: SURGERY

## 2022-01-01 PROCEDURE — 1111F DSCHRG MED/CURRENT MED MERGE: CPT | Performed by: PHYSICIAN ASSISTANT

## 2022-01-01 PROCEDURE — 04703DZ DILATION OF ABDOMINAL AORTA WITH INTRALUMINAL DEVICE, PERCUTANEOUS APPROACH: ICD-10-PCS | Performed by: SURGERY

## 2022-01-01 PROCEDURE — 94729 DIFFUSING CAPACITY: CPT | Performed by: INTERNAL MEDICINE

## 2022-01-01 PROCEDURE — 31623 DX BRONCHOSCOPE/BRUSH: CPT | Performed by: INTERNAL MEDICINE

## 2022-01-01 PROCEDURE — G0152 HHCP-SERV OF OT,EA 15 MIN: HCPCS

## 2022-01-01 PROCEDURE — 97530 THERAPEUTIC ACTIVITIES: CPT

## 2022-01-01 PROCEDURE — 31628 BRONCHOSCOPY/LUNG BX EACH: CPT | Performed by: INTERNAL MEDICINE

## 2022-01-01 PROCEDURE — 99214 OFFICE O/P EST MOD 30 MIN: CPT | Performed by: FAMILY MEDICINE

## 2022-01-01 PROCEDURE — 71250 CT THORAX DX C-: CPT

## 2022-01-01 PROCEDURE — 99213 OFFICE O/P EST LOW 20 MIN: CPT | Performed by: FAMILY MEDICINE

## 2022-01-01 PROCEDURE — 72110 X-RAY EXAM L-2 SPINE 4/>VWS: CPT

## 2022-01-01 PROCEDURE — 85025 COMPLETE CBC W/AUTO DIFF WBC: CPT | Performed by: SURGERY

## 2022-01-01 PROCEDURE — 71045 X-RAY EXAM CHEST 1 VIEW: CPT

## 2022-01-01 PROCEDURE — 99213 OFFICE O/P EST LOW 20 MIN: CPT | Performed by: PHYSICIAN ASSISTANT

## 2022-01-01 PROCEDURE — 87206 SMEAR FLUORESCENT/ACID STAI: CPT | Performed by: INTERNAL MEDICINE

## 2022-01-01 PROCEDURE — 99215 OFFICE O/P EST HI 40 MIN: CPT | Performed by: INTERNAL MEDICINE

## 2022-01-01 PROCEDURE — 80048 BASIC METABOLIC PNL TOTAL CA: CPT | Performed by: PHYSICIAN ASSISTANT

## 2022-01-01 PROCEDURE — 99495 TRANSJ CARE MGMT MOD F2F 14D: CPT | Performed by: PHYSICIAN ASSISTANT

## 2022-01-01 PROCEDURE — 0 IOPAMIDOL PER 1 ML: Performed by: NURSE PRACTITIONER

## 2022-01-01 PROCEDURE — C1887 CATHETER, GUIDING: HCPCS | Performed by: SURGERY

## 2022-01-01 PROCEDURE — 75635 CT ANGIO ABDOMINAL ARTERIES: CPT

## 2022-01-01 PROCEDURE — 31629 BRONCHOSCOPY/NEEDLE BX EACH: CPT | Performed by: INTERNAL MEDICINE

## 2022-01-01 PROCEDURE — 93005 ELECTROCARDIOGRAM TRACING: CPT

## 2022-01-01 PROCEDURE — G0180 MD CERTIFICATION HHA PATIENT: HCPCS | Performed by: PHYSICIAN ASSISTANT

## 2022-01-01 PROCEDURE — G0151 HHCP-SERV OF PT,EA 15 MIN: HCPCS

## 2022-01-01 PROCEDURE — C1760 CLOSURE DEV, VASC: HCPCS | Performed by: SURGERY

## 2022-01-01 PROCEDURE — 25010000002 PROPOFOL 10 MG/ML EMULSION: Performed by: NURSE ANESTHETIST, CERTIFIED REGISTERED

## 2022-01-01 PROCEDURE — 047H3DZ DILATION OF RIGHT EXTERNAL ILIAC ARTERY WITH INTRALUMINAL DEVICE, PERCUTANEOUS APPROACH: ICD-10-PCS | Performed by: SURGERY

## 2022-01-01 PROCEDURE — 94726 PLETHYSMOGRAPHY LUNG VOLUMES: CPT | Performed by: INTERNAL MEDICINE

## 2022-01-01 PROCEDURE — 31652 BRONCH EBUS SAMPLNG 1/2 NODE: CPT | Performed by: INTERNAL MEDICINE

## 2022-01-01 PROCEDURE — 80048 BASIC METABOLIC PNL TOTAL CA: CPT

## 2022-01-01 PROCEDURE — 25010000002 ONDANSETRON PER 1 MG: Performed by: NURSE ANESTHETIST, CERTIFIED REGISTERED

## 2022-01-01 PROCEDURE — 87102 FUNGUS ISOLATION CULTURE: CPT | Performed by: INTERNAL MEDICINE

## 2022-01-01 PROCEDURE — 99214 OFFICE O/P EST MOD 30 MIN: CPT | Performed by: NURSE PRACTITIONER

## 2022-01-01 PROCEDURE — 87070 CULTURE OTHR SPECIMN AEROBIC: CPT | Performed by: INTERNAL MEDICINE

## 2022-01-01 PROCEDURE — 047J3DZ DILATION OF LEFT EXTERNAL ILIAC ARTERY WITH INTRALUMINAL DEVICE, PERCUTANEOUS APPROACH: ICD-10-PCS | Performed by: SURGERY

## 2022-01-01 PROCEDURE — 82565 ASSAY OF CREATININE: CPT

## 2022-01-01 PROCEDURE — 31624 DX BRONCHOSCOPE/LAVAGE: CPT | Performed by: INTERNAL MEDICINE

## 2022-01-01 PROCEDURE — 76000 FLUOROSCOPY <1 HR PHYS/QHP: CPT

## 2022-01-01 PROCEDURE — 0 IODIXANOL PER 1 ML: Performed by: SURGERY

## 2022-01-01 PROCEDURE — 31627 NAVIGATIONAL BRONCHOSCOPY: CPT | Performed by: INTERNAL MEDICINE

## 2022-01-01 PROCEDURE — 25010000002 PROTAMINE SULFATE PER 10 MG: Performed by: ANESTHESIOLOGY

## 2022-01-01 PROCEDURE — 85025 COMPLETE CBC W/AUTO DIFF WBC: CPT

## 2022-01-01 PROCEDURE — 25010000002 PROPOFOL 10 MG/ML EMULSION

## 2022-01-01 PROCEDURE — 85027 COMPLETE CBC AUTOMATED: CPT | Performed by: PHYSICIAN ASSISTANT

## 2022-01-01 PROCEDURE — 87116 MYCOBACTERIA CULTURE: CPT | Performed by: INTERNAL MEDICINE

## 2022-01-01 PROCEDURE — 83036 HEMOGLOBIN GLYCOSYLATED A1C: CPT

## 2022-01-01 PROCEDURE — 80048 BASIC METABOLIC PNL TOTAL CA: CPT | Performed by: SURGERY

## 2022-01-01 DEVICE — STENTGR ENDOPROSTH VIABAHN RO HEP 6F 6MM 10X120CM: Type: IMPLANTABLE DEVICE | Site: ARTERY ILIAC | Status: FUNCTIONAL

## 2022-01-01 DEVICE — IMPLANTABLE DEVICE: Type: IMPLANTABLE DEVICE | Site: ARTERY ILIAC | Status: FUNCTIONAL

## 2022-01-01 DEVICE — STENTGR ENDOPROSTH VIABAHN VBX EXP 7F 7X11X79MM 80CM: Type: IMPLANTABLE DEVICE | Site: ARTERY ILIAC | Status: FUNCTIONAL

## 2022-01-01 DEVICE — STENTGR ENDOPROSTH VIABAHN VBX EXP 7F 7X11X39MM 80CM: Type: IMPLANTABLE DEVICE | Site: AORTA | Status: FUNCTIONAL

## 2022-01-01 RX ORDER — DULOXETIN HYDROCHLORIDE 60 MG/1
60 CAPSULE, DELAYED RELEASE ORAL DAILY
Qty: 90 CAPSULE | Refills: 1 | Status: SHIPPED | OUTPATIENT
Start: 2022-01-01

## 2022-01-01 RX ORDER — ASPIRIN 81 MG/1
81 TABLET ORAL DAILY
Status: CANCELLED | OUTPATIENT
Start: 2022-01-01

## 2022-01-01 RX ORDER — CETIRIZINE HYDROCHLORIDE 10 MG/1
10 TABLET ORAL DAILY
Status: CANCELLED | OUTPATIENT
Start: 2022-01-01

## 2022-01-01 RX ORDER — IPRATROPIUM BROMIDE AND ALBUTEROL SULFATE 2.5; .5 MG/3ML; MG/3ML
3 SOLUTION RESPIRATORY (INHALATION) ONCE AS NEEDED
Status: DISCONTINUED | OUTPATIENT
Start: 2022-01-01 | End: 2022-01-01 | Stop reason: HOSPADM

## 2022-01-01 RX ORDER — SODIUM CHLORIDE 0.9 % (FLUSH) 0.9 %
10 SYRINGE (ML) INJECTION AS NEEDED
Status: DISCONTINUED | OUTPATIENT
Start: 2022-01-01 | End: 2022-01-01 | Stop reason: HOSPADM

## 2022-01-01 RX ORDER — HYDROMORPHONE HYDROCHLORIDE 1 MG/ML
0.5 INJECTION, SOLUTION INTRAMUSCULAR; INTRAVENOUS; SUBCUTANEOUS
Status: DISCONTINUED | OUTPATIENT
Start: 2022-01-01 | End: 2022-01-01 | Stop reason: HOSPADM

## 2022-01-01 RX ORDER — HEPARIN SODIUM 1000 [USP'U]/ML
INJECTION, SOLUTION INTRAVENOUS; SUBCUTANEOUS AS NEEDED
Status: DISCONTINUED | OUTPATIENT
Start: 2022-01-01 | End: 2022-01-01 | Stop reason: SURG

## 2022-01-01 RX ORDER — ONDANSETRON 2 MG/ML
4 INJECTION INTRAMUSCULAR; INTRAVENOUS ONCE AS NEEDED
Status: DISCONTINUED | OUTPATIENT
Start: 2022-01-01 | End: 2022-01-01 | Stop reason: HOSPADM

## 2022-01-01 RX ORDER — HYDROCODONE BITARTRATE AND ACETAMINOPHEN 5; 325 MG/1; MG/1
1 TABLET ORAL EVERY 6 HOURS PRN
Qty: 10 TABLET | Refills: 0 | Status: SHIPPED | OUTPATIENT
Start: 2022-01-01 | End: 2022-01-01

## 2022-01-01 RX ORDER — CILOSTAZOL 50 MG/1
50 TABLET ORAL 2 TIMES DAILY
Qty: 180 TABLET | Refills: 1 | Status: SHIPPED | OUTPATIENT
Start: 2022-01-01 | End: 2022-01-01 | Stop reason: HOSPADM

## 2022-01-01 RX ORDER — ALBUTEROL SULFATE 90 UG/1
4 AEROSOL, METERED RESPIRATORY (INHALATION) ONCE
Status: COMPLETED | OUTPATIENT
Start: 2022-01-01 | End: 2022-01-01

## 2022-01-01 RX ORDER — SODIUM CHLORIDE 9 MG/ML
75 INJECTION, SOLUTION INTRAVENOUS ONCE
Status: COMPLETED | OUTPATIENT
Start: 2022-01-01 | End: 2022-01-01

## 2022-01-01 RX ORDER — SODIUM CHLORIDE 9 MG/ML
INJECTION, SOLUTION INTRAVENOUS CONTINUOUS PRN
Status: DISCONTINUED | OUTPATIENT
Start: 2022-01-01 | End: 2022-01-01 | Stop reason: SURG

## 2022-01-01 RX ORDER — AMOXICILLIN 500 MG/1
1000 CAPSULE ORAL 3 TIMES DAILY
Qty: 21 CAPSULE | Refills: 0 | Status: SHIPPED | OUTPATIENT
Start: 2022-01-01 | End: 2022-01-01

## 2022-01-01 RX ORDER — PHENYLEPHRINE HCL IN 0.9% NACL 1 MG/10 ML
SYRINGE (ML) INTRAVENOUS AS NEEDED
Status: DISCONTINUED | OUTPATIENT
Start: 2022-01-01 | End: 2022-01-01 | Stop reason: SURG

## 2022-01-01 RX ORDER — IODIXANOL 320 MG/ML
INJECTION, SOLUTION INTRAVASCULAR AS NEEDED
Status: DISCONTINUED | OUTPATIENT
Start: 2022-01-01 | End: 2022-01-01 | Stop reason: HOSPADM

## 2022-01-01 RX ORDER — SODIUM CHLORIDE, SODIUM LACTATE, POTASSIUM CHLORIDE, CALCIUM CHLORIDE 600; 310; 30; 20 MG/100ML; MG/100ML; MG/100ML; MG/100ML
9 INJECTION, SOLUTION INTRAVENOUS CONTINUOUS
Status: DISCONTINUED | OUTPATIENT
Start: 2022-01-01 | End: 2022-01-01 | Stop reason: HOSPADM

## 2022-01-01 RX ORDER — CLOPIDOGREL BISULFATE 75 MG/1
75 TABLET ORAL DAILY
Status: DISCONTINUED | OUTPATIENT
Start: 2022-01-01 | End: 2022-01-01 | Stop reason: HOSPADM

## 2022-01-01 RX ORDER — NICOTINE 21 MG/24HR
1 PATCH, TRANSDERMAL 24 HOURS TRANSDERMAL
Status: DISCONTINUED | OUTPATIENT
Start: 2022-01-01 | End: 2022-01-01 | Stop reason: HOSPADM

## 2022-01-01 RX ORDER — DEXAMETHASONE SODIUM PHOSPHATE 4 MG/ML
INJECTION, SOLUTION INTRA-ARTICULAR; INTRALESIONAL; INTRAMUSCULAR; INTRAVENOUS; SOFT TISSUE AS NEEDED
Status: DISCONTINUED | OUTPATIENT
Start: 2022-01-01 | End: 2022-01-01 | Stop reason: SURG

## 2022-01-01 RX ORDER — FAMOTIDINE 20 MG/1
20 TABLET, FILM COATED ORAL ONCE
Status: CANCELLED | OUTPATIENT
Start: 2022-01-01 | End: 2022-01-01

## 2022-01-01 RX ORDER — LABETALOL HYDROCHLORIDE 5 MG/ML
5 INJECTION, SOLUTION INTRAVENOUS
Status: DISCONTINUED | OUTPATIENT
Start: 2022-01-01 | End: 2022-01-01 | Stop reason: HOSPADM

## 2022-01-01 RX ORDER — HYDROCODONE BITARTRATE AND ACETAMINOPHEN 7.5; 325 MG/1; MG/1
1 TABLET ORAL EVERY 4 HOURS PRN
Status: DISCONTINUED | OUTPATIENT
Start: 2022-01-01 | End: 2022-01-01 | Stop reason: HOSPADM

## 2022-01-01 RX ORDER — ONDANSETRON 2 MG/ML
INJECTION INTRAMUSCULAR; INTRAVENOUS AS NEEDED
Status: DISCONTINUED | OUTPATIENT
Start: 2022-01-01 | End: 2022-01-01 | Stop reason: SURG

## 2022-01-01 RX ORDER — LIDOCAINE HYDROCHLORIDE 10 MG/ML
0.2 INJECTION, SOLUTION EPIDURAL; INFILTRATION; INTRACAUDAL; PERINEURAL ONCE
Status: COMPLETED | OUTPATIENT
Start: 2022-01-01 | End: 2022-01-01

## 2022-01-01 RX ORDER — FENTANYL CITRATE 50 UG/ML
INJECTION, SOLUTION INTRAMUSCULAR; INTRAVENOUS AS NEEDED
Status: DISCONTINUED | OUTPATIENT
Start: 2022-01-01 | End: 2022-01-01 | Stop reason: SURG

## 2022-01-01 RX ORDER — CILOSTAZOL 50 MG/1
50 TABLET ORAL 2 TIMES DAILY
Status: CANCELLED | OUTPATIENT
Start: 2022-01-01

## 2022-01-01 RX ORDER — ENOXAPARIN SODIUM 100 MG/ML
40 INJECTION SUBCUTANEOUS DAILY
Status: DISCONTINUED | OUTPATIENT
Start: 2022-01-01 | End: 2022-01-01 | Stop reason: HOSPADM

## 2022-01-01 RX ORDER — CLOPIDOGREL BISULFATE 75 MG/1
75 TABLET ORAL DAILY
Qty: 90 TABLET | Refills: 1 | Status: SHIPPED | OUTPATIENT
Start: 2022-01-01

## 2022-01-01 RX ORDER — IPRATROPIUM BROMIDE AND ALBUTEROL SULFATE 2.5; .5 MG/3ML; MG/3ML
3 SOLUTION RESPIRATORY (INHALATION) ONCE AS NEEDED
Status: COMPLETED | OUTPATIENT
Start: 2022-01-01 | End: 2022-01-01

## 2022-01-01 RX ORDER — ESMOLOL HYDROCHLORIDE 10 MG/ML
INJECTION INTRAVENOUS AS NEEDED
Status: DISCONTINUED | OUTPATIENT
Start: 2022-01-01 | End: 2022-01-01 | Stop reason: SURG

## 2022-01-01 RX ORDER — SODIUM CHLORIDE, SODIUM LACTATE, POTASSIUM CHLORIDE, CALCIUM CHLORIDE 600; 310; 30; 20 MG/100ML; MG/100ML; MG/100ML; MG/100ML
9 INJECTION, SOLUTION INTRAVENOUS CONTINUOUS
Status: CANCELLED | OUTPATIENT
Start: 2022-01-01

## 2022-01-01 RX ORDER — LIDOCAINE HYDROCHLORIDE 10 MG/ML
INJECTION, SOLUTION EPIDURAL; INFILTRATION; INTRACAUDAL; PERINEURAL AS NEEDED
Status: DISCONTINUED | OUTPATIENT
Start: 2022-01-01 | End: 2022-01-01 | Stop reason: SURG

## 2022-01-01 RX ORDER — LIDOCAINE HYDROCHLORIDE 40 MG/ML
4 INJECTION, SOLUTION RETROBULBAR; TOPICAL ONCE
Status: CANCELLED | OUTPATIENT
Start: 2022-01-01 | End: 2022-01-01

## 2022-01-01 RX ORDER — GLYCOPYRROLATE 0.2 MG/ML
INJECTION INTRAMUSCULAR; INTRAVENOUS AS NEEDED
Status: DISCONTINUED | OUTPATIENT
Start: 2022-01-01 | End: 2022-01-01 | Stop reason: SURG

## 2022-01-01 RX ORDER — CEFAZOLIN SODIUM 2 G/100ML
2 INJECTION, SOLUTION INTRAVENOUS EVERY 8 HOURS
Status: COMPLETED | OUTPATIENT
Start: 2022-01-01 | End: 2022-01-01

## 2022-01-01 RX ORDER — ATORVASTATIN CALCIUM 40 MG/1
40 TABLET, FILM COATED ORAL DAILY
Qty: 30 TABLET | Refills: 5 | Status: SHIPPED | OUTPATIENT
Start: 2022-01-01

## 2022-01-01 RX ORDER — MIDAZOLAM HYDROCHLORIDE 1 MG/ML
0.5 INJECTION INTRAMUSCULAR; INTRAVENOUS
Status: CANCELLED | OUTPATIENT
Start: 2022-01-01

## 2022-01-01 RX ORDER — PROTAMINE SULFATE 10 MG/ML
INJECTION, SOLUTION INTRAVENOUS AS NEEDED
Status: DISCONTINUED | OUTPATIENT
Start: 2022-01-01 | End: 2022-01-01 | Stop reason: SURG

## 2022-01-01 RX ORDER — LIDOCAINE HYDROCHLORIDE 10 MG/ML
0.5 INJECTION, SOLUTION EPIDURAL; INFILTRATION; INTRACAUDAL; PERINEURAL ONCE AS NEEDED
Status: CANCELLED | OUTPATIENT
Start: 2022-01-01

## 2022-01-01 RX ORDER — CILOSTAZOL 50 MG/1
50 TABLET ORAL 2 TIMES DAILY
Qty: 180 TABLET | Refills: 0 | Status: SHIPPED | OUTPATIENT
Start: 2022-01-01 | End: 2022-01-01 | Stop reason: SDUPTHER

## 2022-01-01 RX ORDER — DULOXETIN HYDROCHLORIDE 60 MG/1
60 CAPSULE, DELAYED RELEASE ORAL DAILY
Qty: 30 CAPSULE | Refills: 0 | Status: SHIPPED | OUTPATIENT
Start: 2022-01-01 | End: 2022-01-01 | Stop reason: SDUPTHER

## 2022-01-01 RX ORDER — LIDOCAINE HYDROCHLORIDE 10 MG/ML
INJECTION, SOLUTION INFILTRATION; PERINEURAL AS NEEDED
Status: DISCONTINUED | OUTPATIENT
Start: 2022-01-01 | End: 2022-01-01 | Stop reason: HOSPADM

## 2022-01-01 RX ORDER — ASPIRIN 81 MG/1
81 TABLET, CHEWABLE ORAL DAILY
Status: DISCONTINUED | OUTPATIENT
Start: 2022-01-01 | End: 2022-01-01 | Stop reason: HOSPADM

## 2022-01-01 RX ORDER — NEOSTIGMINE METHYLSULFATE 1 MG/ML
INJECTION, SOLUTION INTRAVENOUS AS NEEDED
Status: DISCONTINUED | OUTPATIENT
Start: 2022-01-01 | End: 2022-01-01 | Stop reason: SURG

## 2022-01-01 RX ORDER — ATORVASTATIN CALCIUM 40 MG/1
40 TABLET, FILM COATED ORAL DAILY
Status: CANCELLED | OUTPATIENT
Start: 2022-01-01

## 2022-01-01 RX ORDER — ACETAMINOPHEN 325 MG/1
650 TABLET ORAL EVERY 4 HOURS PRN
Status: DISCONTINUED | OUTPATIENT
Start: 2022-01-01 | End: 2022-01-01 | Stop reason: HOSPADM

## 2022-01-01 RX ORDER — HYDROCODONE BITARTRATE AND ACETAMINOPHEN 5; 325 MG/1; MG/1
1 TABLET ORAL ONCE AS NEEDED
Status: DISCONTINUED | OUTPATIENT
Start: 2022-01-01 | End: 2022-01-01 | Stop reason: HOSPADM

## 2022-01-01 RX ORDER — DULOXETIN HYDROCHLORIDE 60 MG/1
60 CAPSULE, DELAYED RELEASE ORAL DAILY
Status: CANCELLED | OUTPATIENT
Start: 2022-01-01

## 2022-01-01 RX ORDER — TRAZODONE HYDROCHLORIDE 150 MG/1
150 TABLET ORAL NIGHTLY
Qty: 90 TABLET | Refills: 1 | Status: SHIPPED | OUTPATIENT
Start: 2022-01-01

## 2022-01-01 RX ORDER — ROCURONIUM BROMIDE 10 MG/ML
INJECTION, SOLUTION INTRAVENOUS AS NEEDED
Status: DISCONTINUED | OUTPATIENT
Start: 2022-01-01 | End: 2022-01-01 | Stop reason: SURG

## 2022-01-01 RX ORDER — NALOXONE HCL 0.4 MG/ML
0.4 VIAL (ML) INJECTION AS NEEDED
Status: DISCONTINUED | OUTPATIENT
Start: 2022-01-01 | End: 2022-01-01 | Stop reason: HOSPADM

## 2022-01-01 RX ORDER — SODIUM CHLORIDE 9 MG/ML
40 INJECTION, SOLUTION INTRAVENOUS AS NEEDED
Status: CANCELLED | OUTPATIENT
Start: 2022-01-01

## 2022-01-01 RX ORDER — SODIUM CHLORIDE 450 MG/100ML
75 INJECTION, SOLUTION INTRAVENOUS CONTINUOUS
Status: DISCONTINUED | OUTPATIENT
Start: 2022-01-01 | End: 2022-01-01 | Stop reason: HOSPADM

## 2022-01-01 RX ORDER — ONDANSETRON 2 MG/ML
4 INJECTION INTRAMUSCULAR; INTRAVENOUS EVERY 6 HOURS PRN
Status: DISCONTINUED | OUTPATIENT
Start: 2022-01-01 | End: 2022-01-01 | Stop reason: HOSPADM

## 2022-01-01 RX ORDER — PROPOFOL 10 MG/ML
VIAL (ML) INTRAVENOUS AS NEEDED
Status: DISCONTINUED | OUTPATIENT
Start: 2022-01-01 | End: 2022-01-01 | Stop reason: SURG

## 2022-01-01 RX ORDER — TRAZODONE HYDROCHLORIDE 150 MG/1
150 TABLET ORAL NIGHTLY
Qty: 30 TABLET | Refills: 1 | Status: SHIPPED | OUTPATIENT
Start: 2022-01-01 | End: 2022-01-01 | Stop reason: SDUPTHER

## 2022-01-01 RX ORDER — SODIUM CHLORIDE 0.9 % (FLUSH) 0.9 %
3-10 SYRINGE (ML) INJECTION AS NEEDED
Status: DISCONTINUED | OUTPATIENT
Start: 2022-01-01 | End: 2022-01-01 | Stop reason: HOSPADM

## 2022-01-01 RX ORDER — ONDANSETRON 4 MG/1
4 TABLET, FILM COATED ORAL EVERY 6 HOURS PRN
Status: DISCONTINUED | OUTPATIENT
Start: 2022-01-01 | End: 2022-01-01 | Stop reason: HOSPADM

## 2022-01-01 RX ORDER — SODIUM CHLORIDE 0.9 % (FLUSH) 0.9 %
10 SYRINGE (ML) INJECTION EVERY 12 HOURS SCHEDULED
Status: CANCELLED | OUTPATIENT
Start: 2022-01-01

## 2022-01-01 RX ORDER — SODIUM CHLORIDE 0.9 % (FLUSH) 0.9 %
3 SYRINGE (ML) INJECTION EVERY 12 HOURS SCHEDULED
Status: DISCONTINUED | OUTPATIENT
Start: 2022-01-01 | End: 2022-01-01 | Stop reason: HOSPADM

## 2022-01-01 RX ORDER — FAMOTIDINE 10 MG/ML
20 INJECTION, SOLUTION INTRAVENOUS ONCE
Status: COMPLETED | OUTPATIENT
Start: 2022-01-01 | End: 2022-01-01

## 2022-01-01 RX ORDER — CEFAZOLIN SODIUM 2 G/100ML
2 INJECTION, SOLUTION INTRAVENOUS ONCE
Status: COMPLETED | OUTPATIENT
Start: 2022-01-01 | End: 2022-01-01

## 2022-01-01 RX ORDER — AMOXICILLIN 250 MG
1 CAPSULE ORAL 2 TIMES DAILY
Status: DISCONTINUED | OUTPATIENT
Start: 2022-01-01 | End: 2022-01-01 | Stop reason: HOSPADM

## 2022-01-01 RX ORDER — FENTANYL CITRATE 50 UG/ML
50 INJECTION, SOLUTION INTRAMUSCULAR; INTRAVENOUS
Status: DISCONTINUED | OUTPATIENT
Start: 2022-01-01 | End: 2022-01-01 | Stop reason: HOSPADM

## 2022-01-01 RX ORDER — FAMOTIDINE 20 MG/1
20 TABLET, FILM COATED ORAL ONCE
Status: COMPLETED | OUTPATIENT
Start: 2022-01-01 | End: 2022-01-01

## 2022-01-01 RX ORDER — HYDROCODONE BITARTRATE AND ACETAMINOPHEN 5; 325 MG/1; MG/1
TABLET ORAL
Status: COMPLETED
Start: 2022-01-01 | End: 2022-01-01

## 2022-01-01 RX ADMIN — NEOSTIGMINE 3 MG: 1 INJECTION INTRAVENOUS at 11:39

## 2022-01-01 RX ADMIN — HYDROCODONE BITARTRATE AND ACETAMINOPHEN 1 TABLET: 7.5; 325 TABLET ORAL at 08:27

## 2022-01-01 RX ADMIN — IOPAMIDOL 125 ML: 755 INJECTION, SOLUTION INTRAVENOUS at 14:03

## 2022-01-01 RX ADMIN — HEPARIN SODIUM 5000 UNITS: 1000 INJECTION, SOLUTION INTRAVENOUS; SUBCUTANEOUS at 14:01

## 2022-01-01 RX ADMIN — ALBUTEROL SULFATE 4 PUFF: 90 AEROSOL, METERED RESPIRATORY (INHALATION) at 09:45

## 2022-01-01 RX ADMIN — PROTAMINE SULFATE 30 MG: 10 INJECTION, SOLUTION INTRAVENOUS at 14:47

## 2022-01-01 RX ADMIN — HYDROCODONE BITARTRATE AND ACETAMINOPHEN 1 TABLET: 7.5; 325 TABLET ORAL at 23:06

## 2022-01-01 RX ADMIN — SODIUM CHLORIDE, POTASSIUM CHLORIDE, SODIUM LACTATE AND CALCIUM CHLORIDE 9 ML/HR: 600; 310; 30; 20 INJECTION, SOLUTION INTRAVENOUS at 12:00

## 2022-01-01 RX ADMIN — Medication 100 MCG: at 11:13

## 2022-01-01 RX ADMIN — SENNOSIDES AND DOCUSATE SODIUM 1 TABLET: 50; 8.6 TABLET ORAL at 20:18

## 2022-01-01 RX ADMIN — ESMOLOL HYDROCHLORIDE 10 MG: 10 INJECTION, SOLUTION INTRAVENOUS at 10:37

## 2022-01-01 RX ADMIN — IPRATROPIUM BROMIDE AND ALBUTEROL SULFATE 3 ML: .5; 2.5 SOLUTION RESPIRATORY (INHALATION) at 12:25

## 2022-01-01 RX ADMIN — ESMOLOL HYDROCHLORIDE 10 MG: 10 INJECTION, SOLUTION INTRAVENOUS at 11:10

## 2022-01-01 RX ADMIN — SODIUM CHLORIDE: 9 INJECTION, SOLUTION INTRAVENOUS at 10:29

## 2022-01-01 RX ADMIN — ENOXAPARIN SODIUM 40 MG: 40 INJECTION SUBCUTANEOUS at 10:02

## 2022-01-01 RX ADMIN — HYDROCODONE BITARTRATE AND ACETAMINOPHEN 1 TABLET: 5; 325 TABLET ORAL at 15:25

## 2022-01-01 RX ADMIN — ENOXAPARIN SODIUM 40 MG: 40 INJECTION SUBCUTANEOUS at 08:07

## 2022-01-01 RX ADMIN — HYDROCODONE BITARTRATE AND ACETAMINOPHEN 1 TABLET: 7.5; 325 TABLET ORAL at 05:04

## 2022-01-01 RX ADMIN — HYDROCODONE BITARTRATE AND ACETAMINOPHEN 1 TABLET: 7.5; 325 TABLET ORAL at 12:30

## 2022-01-01 RX ADMIN — FENTANYL CITRATE 25 MCG: 50 INJECTION, SOLUTION INTRAMUSCULAR; INTRAVENOUS at 14:46

## 2022-01-01 RX ADMIN — ROCURONIUM BROMIDE 10 MG: 10 INJECTION INTRAVENOUS at 11:00

## 2022-01-01 RX ADMIN — SODIUM CHLORIDE 75 ML/HR: 4.5 INJECTION, SOLUTION INTRAVENOUS at 15:59

## 2022-01-01 RX ADMIN — Medication 100 MCG: at 11:20

## 2022-01-01 RX ADMIN — Medication 200 MCG: at 10:55

## 2022-01-01 RX ADMIN — PROPOFOL 100 MG: 10 INJECTION, EMULSION INTRAVENOUS at 10:37

## 2022-01-01 RX ADMIN — HYDROCODONE BITARTRATE AND ACETAMINOPHEN 1 TABLET: 7.5; 325 TABLET ORAL at 08:07

## 2022-01-01 RX ADMIN — HYDROCODONE BITARTRATE AND ACETAMINOPHEN 1 TABLET: 7.5; 325 TABLET ORAL at 20:56

## 2022-01-01 RX ADMIN — HYDROCODONE BITARTRATE AND ACETAMINOPHEN 1 TABLET: 7.5; 325 TABLET ORAL at 14:10

## 2022-01-01 RX ADMIN — FAMOTIDINE 20 MG: 20 TABLET ORAL at 12:24

## 2022-01-01 RX ADMIN — ENOXAPARIN SODIUM 40 MG: 40 INJECTION SUBCUTANEOUS at 08:27

## 2022-01-01 RX ADMIN — NICOTINE 1 PATCH: 14 PATCH, EXTENDED RELEASE TRANSDERMAL at 08:10

## 2022-01-01 RX ADMIN — ESMOLOL HYDROCHLORIDE 5 MG: 10 INJECTION, SOLUTION INTRAVENOUS at 11:26

## 2022-01-01 RX ADMIN — ACETAMINOPHEN 650 MG: 325 TABLET, FILM COATED ORAL at 01:27

## 2022-01-01 RX ADMIN — ESMOLOL HYDROCHLORIDE 10 MG: 10 INJECTION, SOLUTION INTRAVENOUS at 10:44

## 2022-01-01 RX ADMIN — SENNOSIDES AND DOCUSATE SODIUM 1 TABLET: 50; 8.6 TABLET ORAL at 20:56

## 2022-01-01 RX ADMIN — CEFAZOLIN SODIUM 2 G: 2 INJECTION, SOLUTION INTRAVENOUS at 13:37

## 2022-01-01 RX ADMIN — CEFAZOLIN SODIUM 2 G: 2 INJECTION, SOLUTION INTRAVENOUS at 00:36

## 2022-01-01 RX ADMIN — FENTANYL CITRATE 50 MCG: 50 INJECTION, SOLUTION INTRAMUSCULAR; INTRAVENOUS at 13:40

## 2022-01-01 RX ADMIN — HYDROCODONE BITARTRATE AND ACETAMINOPHEN 1 TABLET: 7.5; 325 TABLET ORAL at 02:08

## 2022-01-01 RX ADMIN — GLYCOPYRROLATE 0.4 MG: 0.2 INJECTION INTRAMUSCULAR; INTRAVENOUS at 11:39

## 2022-01-01 RX ADMIN — HYDROCODONE BITARTRATE AND ACETAMINOPHEN 1 TABLET: 7.5; 325 TABLET ORAL at 20:18

## 2022-01-01 RX ADMIN — SENNOSIDES AND DOCUSATE SODIUM 1 TABLET: 50; 8.6 TABLET ORAL at 08:07

## 2022-01-01 RX ADMIN — NICOTINE 1 PATCH: 14 PATCH, EXTENDED RELEASE TRANSDERMAL at 10:04

## 2022-01-01 RX ADMIN — HYDROCODONE BITARTRATE AND ACETAMINOPHEN 1 TABLET: 7.5; 325 TABLET ORAL at 14:29

## 2022-01-01 RX ADMIN — Medication 10 ML: at 09:33

## 2022-01-01 RX ADMIN — LIDOCAINE HYDROCHLORIDE 0.2 ML: 10 INJECTION, SOLUTION EPIDURAL; INFILTRATION; INTRACAUDAL; PERINEURAL at 12:00

## 2022-01-01 RX ADMIN — ONDANSETRON 4 MG: 2 INJECTION INTRAMUSCULAR; INTRAVENOUS at 10:37

## 2022-01-01 RX ADMIN — FENTANYL CITRATE 25 MCG: 50 INJECTION, SOLUTION INTRAMUSCULAR; INTRAVENOUS at 14:15

## 2022-01-01 RX ADMIN — NICOTINE 1 PATCH: 14 PATCH, EXTENDED RELEASE TRANSDERMAL at 17:41

## 2022-01-01 RX ADMIN — LIDOCAINE HYDROCHLORIDE 50 MG: 10 INJECTION, SOLUTION EPIDURAL; INFILTRATION; INTRACAUDAL; PERINEURAL at 10:37

## 2022-01-01 RX ADMIN — ESMOLOL HYDROCHLORIDE 5 MG: 10 INJECTION, SOLUTION INTRAVENOUS at 11:39

## 2022-01-01 RX ADMIN — CEFAZOLIN SODIUM 2 G: 2 INJECTION, SOLUTION INTRAVENOUS at 18:30

## 2022-01-01 RX ADMIN — DEXAMETHASONE SODIUM PHOSPHATE 4 MG: 4 INJECTION, SOLUTION INTRA-ARTICULAR; INTRALESIONAL; INTRAMUSCULAR; INTRAVENOUS; SOFT TISSUE at 10:44

## 2022-01-01 RX ADMIN — FAMOTIDINE 20 MG: 10 INJECTION INTRAVENOUS at 09:33

## 2022-01-01 RX ADMIN — SODIUM CHLORIDE 75 ML/HR: 9 INJECTION, SOLUTION INTRAVENOUS at 09:33

## 2022-01-01 RX ADMIN — ROCURONIUM BROMIDE 30 MG: 10 INJECTION INTRAVENOUS at 10:37

## 2022-01-01 RX ADMIN — PROPOFOL 50 MG: 10 INJECTION, EMULSION INTRAVENOUS at 10:42

## 2022-01-01 RX ADMIN — PROPOFOL 100 MCG/KG/MIN: 10 INJECTION, EMULSION INTRAVENOUS at 13:40

## 2022-02-18 NOTE — TELEPHONE ENCOUNTER
Patient called to report pain in left leg/foot that has been going on for about a week.  She went to urgent care and was told to follow up with cardiology for circulatory problem.  Advised will request records for review and let her know our recommendations.

## 2022-03-02 NOTE — PROGRESS NOTES
"     [facility] Cardiothoracic Surgery Office Follow Up Note     Date of Encounter: 2022     Name: Shirley Calhoun  : 1940     Referred By: No ref. provider found  PCP: Arvind Foley MD    Chief Complaint:    Chief Complaint   Patient presents with   • Follow-up     Follow up for peripheral vascular disease,complains of left leg pain.   • Peripheral Vascular Disease       Subjective      History of Present Illness:    Shirley Calhoun is a 81 y.o. female current smoker, with a history of HLD on statin therapy, COPD, osteoporosis, traumatic subdural hematoma, basal cell carcinoma, breast cancer, CAD, and PAD s/p attempted aortogram 4/15/2021 with Dr. Andrade.  During aortogram, it was noted that her left iliofemoral system was nearly impassible with subintimal dissection and was not felt safe to continue with procedure.  Previous CTA imaging demonstrated severe infrarenal atherosclerosis, occluded right iliac, occluded left SFA and right SFA arteries.  Patient was felt to be poor surgical candidate for revascularization per Dr. Andrade and conservative measures were recommended. Patient contacted our office for concerns for 2-3 weeks history of left foot erythema and pain and presents today for evaluation. Patient denies any trauma but reports she noticed worsening left foot erythema, pain about 2-3 weeks ago. She was reportedly evaluated at an urgent care and instructed to follow up with our office for evaluation. Patient has abrasions to her left shin from fall about 1 month ago, but denies any other history of nonhealing ulcerations. She denies any fevers, chills, or body aches. She reports sharp BLE pain \"from her ankles to feet with ambulation and sometimes at rest (R=L). Patient also reports occasional, intermittent BL hip cramping.  Patient is compliant with ASA/Pletal therapy.    Review of Systems:  Review of Systems   Constitutional: Positive for malaise/fatigue. Negative for chills, decreased " appetite, diaphoresis, fever, night sweats and weight loss.   HENT: Negative.  Negative for congestion, hoarse voice, sore throat and stridor.    Cardiovascular: Positive for dyspnea on exertion and leg swelling. Negative for chest pain, claudication, irregular heartbeat, near-syncope, orthopnea, palpitations, paroxysmal nocturnal dyspnea and syncope.   Respiratory: Positive for cough and shortness of breath. Negative for hemoptysis, sleep disturbances due to breathing, snoring, sputum production and wheezing.    Hematologic/Lymphatic: Negative for adenopathy and bleeding problem. Bruises/bleeds easily.   Skin: Positive for color change and poor wound healing. Negative for dry skin, itching and rash.   Musculoskeletal: Positive for arthritis, back pain, falls and muscle weakness.   Gastrointestinal: Negative.  Negative for abdominal pain, anorexia, constipation, diarrhea, hematochezia, melena, nausea and vomiting.   Neurological: Positive for loss of balance. Negative for difficulty with concentration, disturbances in coordination, dizziness, numbness, seizures, vertigo and weakness.   Psychiatric/Behavioral: Positive for depression. Negative for altered mental status, memory loss and substance abuse. The patient does not have insomnia and is not nervous/anxious.    Allergic/Immunologic: Negative for persistent infections.       I have reviewed the following portions of the patient's history: allergies, current medications, past family history, past medical history, past social history, past surgical history, problem list and ROS and confirm it's accurate.    Allergies:  No Known Allergies    Medications:      Current Outpatient Medications:   •  aspirin 81 MG EC tablet, Take 1 tablet by mouth Daily., Disp: 30 tablet, Rfl: 6  •  cilostazol (PLETAL) 50 MG tablet, Take 50 mg by mouth 2 (Two) Times a Day., Disp: , Rfl:   •  Cyanocobalamin (VITAMIN B 12 PO), Take  by mouth., Disp: , Rfl:   •  DULoxetine (CYMBALTA) 60 MG  capsule, TAKE 1 CAPSULE BY MOUTH EVERY DAY, Disp: 90 capsule, Rfl: 1  •  Fluticasone-Umeclidin-Vilant (TRELEGY) 100-62.5-25 MCG/INH inhaler, Inhale 1 puff Daily., Disp: 60 each, Rfl: 2  •  loratadine (CLARITIN) 10 MG tablet, Take 10 mg by mouth Daily., Disp: , Rfl:   •  MILK THISTLE PO, Take  by mouth., Disp: , Rfl:   •  traZODone (DESYREL) 150 MG tablet, Take 150 mg by mouth Every Night., Disp: , Rfl:   •  albuterol sulfate  (90 Base) MCG/ACT inhaler, INHALE 2 PUFFS BY MOUTH EVERY 4 HOURS AS NEEDED FOR WHEEZING (Patient not taking: Reported on 3/2/2022), Disp: 18 g, Rfl: 0  •  amoxicillin-clavulanate (Augmentin) 875-125 MG per tablet, Take 1 tablet by mouth 2 (Two) Times a Day. (Patient not taking: Reported on 3/2/2022), Disp: 14 tablet, Rfl: 0  •  atorvastatin (LIPITOR) 20 MG tablet, Take 1 tablet by mouth Daily. (Patient not taking: Reported on 3/2/2022), Disp: 30 tablet, Rfl: 11  •  linaclotide (Linzess) 290 MCG capsule capsule, Take 1 capsule by mouth Every Morning Before Breakfast. (Patient not taking: Reported on 3/2/2022), Disp: 14 capsule, Rfl: 0  •  methylPREDNISolone (MEDROL) 4 MG dose pack, Take as directed on package instructions. (Patient not taking: Reported on 3/2/2022), Disp: 21 tablet, Rfl: 0  •  mirtazapine (REMERON) 7.5 MG tablet, Take 1 tablet by mouth Every Night. (Patient not taking: Reported on 3/2/2022), Disp: 30 tablet, Rfl: 1  •  nicotine (Nicoderm CQ) 14 MG/24HR patch, Place 1 patch on the skin as directed by provider Daily. (Patient not taking: Reported on 3/2/2022), Disp: 28 patch, Rfl: 0  •  ondansetron (Zofran) 4 MG tablet, Take 1 tablet by mouth Every 8 (Eight) Hours As Needed for Nausea or Vomiting. (Patient not taking: Reported on 3/2/2022), Disp: 20 tablet, Rfl: 0  •  polyethylene glycol (MIRALAX) packet, Take 17 g by mouth Daily. 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   • Wears dentures    • Wears eyeglasses        Past Surgical History:   Procedure Laterality Date   • AORTAGRAM N/A 3/18/2021    Procedure: AORTAGRAM WITH RUNOFFS;  Surgeon: Jarad Andrade MD;  Location:  [facility];  Service: Vascular;  Laterality: N/A;   fluoro3 min 30 sec  mgy 19  visi 30ml   • BACK SURGERY     • BRAIN SURGERY      [facility]   • CARDIAC CATHETERIZATION Left 5/28/2021    Procedure: Left Heart Cath via right radial artery;  Surgeon: Maximino Jernigan MD;  Location:  [facility];  Service: Cardiovascular;  Laterality: Left;   • CATARACT EXTRACTION Bilateral 2013   • COLONOSCOPY      1 year ago   • DILATATION AND CURETTAGE     • KNEE SURGERY Right    • MASTECTOMY Bilateral    • TOTAL HIP ARTHROPLASTY Right 5/1/2017    Procedure: RIGHT TOTAL HIP ARTHROPLASTY ANTERIOR;  Surgeon: Nii Nayak MD;  Location:  [facility] OR;  Service:        Social History     Socioeconomic History   • Marital status:    • Number of children: 3   Tobacco Use   • Smoking status: Current Every Day Smoker     Packs/day: 1.00     Years: 50.00     Pack years: 50.00   • Smokeless tobacco: Never Used   Substance and Sexual Activity   • Alcohol use: Yes     Alcohol/week: 28.0 standard drinks     Types: 28 Shots of liquor per week     Comment: hx of alcohol abuse drinking vodka daily   • Drug use: No   • Sexual activity: Defer        Family History   Problem Relation Age of Onset   • No Known Problems Mother    • Alcohol abuse Father    • No Known Problems Sister    • No Known Problems Brother    • No Known Problems Daughter    • No Known Problems Son        Objective     Physical Exam:  Vitals:    03/02/22 1227   BP:  "150/59   BP Location: Right arm   Patient Position: Sitting   Pulse: 60   Temp: 97.8 °F (36.6 °C)   SpO2: 96%   Weight: 40.8 kg (90 lb)   Height: 154.9 cm (61\")      Body mass index is 17.01 kg/m².    Physical Exam  Cardiovascular:      Pulses:           Dorsalis pedis pulses are detected w/ Doppler on the right side.        Posterior tibial pulses are 0 on the right side and 0 on the left side.      Comments: Left DP: loud venous hum noted on exam, difficult to doppler pulse in right or left PT sites.  Skin:     Comments: Abrasions to patient's left shin; significant dark rubor discoloration noted to left foot without evidence of nonhealing ulceration; see image below          Imaging/Labs:    No radiology results for the last 30 days.     CTA abdomen/pelvis and lower extremity runoff  Result date 3/17/2021:  IMPRESSION:  Narrowing of the origin of the SMA. Extensive calcified plaque formation throughout the arterial structures  Right common iliac artery and external iliac artery occluded. Left common iliac artery and external artery are patent but have multiple areas of stenosis  Both superficial femoral arteries are occluded. On the right side popliteal artery appears patent and the left side popliteal artery appears to be occluded. Anterior tibial runoff into the foot is visible bilaterally. Posterior tibial artery has multiple  stenoses and occlusions bilaterally. The peroneal arteries are visible bilaterally and extend down to the ankle.    Assessment / Plan      Assessment / Plan:  Diagnoses and all orders for this visit:    1. PAD (peripheral artery disease) (HCC) (Primary)    1. PAD: Previous CTA imaging demonstrated severe infrarenal atherosclerosis, occluded right iliac, occluded left SFA and right SFA arteries.  Unsuccessful aortogram March 2021.  Patient was felt to be poor surgical candidate for revascularization per Dr. Andrade and conservative measures were recommended.  Patient reporting 2-week " "history of worsening left foot pain and erythema.  Patient with difficult to detect dopplerable pulses to right DP, evidence of venous hum.  Unable to detect bilateral PT pulses with Doppler.  No evidence of nonhealing ulcerations today.  Patient reports bilateral ankle/foot pain with ambulation and bilateral hip \"tightness\".  Patient is somewhat of a poor historian and difficult to quantify degree of claudication symptoms.  Discussed Dr. Andrade's previous recommendations including conservative measures due to being a poor surgical candidate and patient verbalized understanding.  Likely will continue to monitor patient's left foot erythema and pain.  Continue ASA/Pletal therapy.  Will discuss case with Dr. Alejandra for further recommendations.  Instructed patient to continue monitoring her BLE for nonhealing ulcerations and worsening pain.  Instructed patient to perform structured walking program to the best of her ability.    Addendum: Reviewed case and imaging with Dr. Alejandra. Patient with extensive PVD and Dr. Alejandra does not feel like patient has many revascularization options based on previous imaging and her AA w/ runoff. Due to possible dissection during AA w/ runoff and previous imaging performed almost 1 year ago, Dr. Alejandra is recommending obtaining a CTA w/ runoff for further evaluation of her vasculature d/t her worsening symptoms. Dr. Alejandra feels patient will likely need amputation in the future. Will plan to see patient in f/u to discuss results on an [facility] clinic day.     Follow Up:   Return pending.   Or sooner for any further concerns or worsening sign and symptoms. If unable to reach us in the office please dial 911 or go to the nearest emergency department.      Susan ROTHMAN  [facility] Cardiothoracic Surgery    Time Spent: I spent 37 minutes caring for Shirley on this date of service. This time includes time spent by me in the following activities: preparing for the visit, reviewing " tests, obtaining and/or reviewing a separately obtained history, performing a medically appropriate examination and/or evaluation, counseling and educating the patient/family/caregiver, ordering medications, tests, or procedures and referring and communicating with other health care professionals.

## 2022-03-29 NOTE — TELEPHONE ENCOUNTER
Caller: Shirley Calhoun    Relationship: Self    Best call back number:  [phone]    Requested Prescriptions:   Requested Prescriptions     Pending Prescriptions Disp Refills   • cilostazol (PLETAL) 50 MG tablet       Sig: Take 1 tablet by mouth.        Pharmacy where request should be sent: [id number] [address] AT [facility] - [phone] [facility] [phone] FX     Additional details provided by patient: COMPLETELY OUT OF THIS MEDICATION    Does the patient have less than a 3 day supply:  [x] Yes  [] No    Joel Duong Rep   03/29/22 16:53 EDT

## 2022-04-13 NOTE — PROGRESS NOTES
[facility] Cardiothoracic Surgery Office Follow Up Note     Date of Encounter: 2022     Name: Shirley Calhoun  : 1940     Referred By: No ref. provider found  PCP: Arvind Foley MD    Chief Complaint:    Chief Complaint   Patient presents with   • Follow-up     Follow up after CTA w/ Runoff. Pt states that she has bad arterial pulses and the left leg is the one that has the most pain. Some swelling in the left foot in the evening. Pt is dizzy from time to time. Pt is also very fatigued.        Subjective      History of Present Illness:    Shirley Calhoun is a 82 y.o. female  current smoker, with a history of HLD on statin therapy, COPD, osteoporosis, traumatic subdural hematoma, basal cell carcinoma, breast cancer, CAD, and PAD s/p attempted aortogram 4/15/2021 with Dr. Andrade.  During aortogram, it was noted that her left iliofemoral system was nearly impassible with subintimal dissection and was not felt safe to continue with procedure.  Previous CTA imaging demonstrated severe infrarenal atherosclerosis, occluded right iliac, occluded left SFA and right SFA arteries.  Patient was felt to be poor surgical candidate for revascularization per Dr. Andrade and conservative measures were recommended.  Patient was last seen in clinic by me on 3/2/2022 for 2 to 3-week history of left foot pain and erythema, worse with ambulation.  Case and imaging was reviewed with Dr. Alejandra who recommended repeating CTA imaging due to possible dissection during AAA with runoff and previous imaging performed more than a year ago.  Patient presents today for reevaluation and to discuss CTA results.  Today patient does not have any evidence of worsening erythema in 1 leg.  She denies any issues with new nonhealing ulcerations.  She continues to report left lower leg pain with certain distances that resolves with rest and bilateral hip tightness.  She does try to ambulate frequently.  Patient has been compliant with  ASA/Pletal therapy.    Review of Systems:  Review of Systems   Constitutional: Positive for decreased appetite and malaise/fatigue. Negative for chills, diaphoresis, fever, night sweats, weight gain and weight loss.   HENT: Negative for hoarse voice.    Eyes: Negative for blurred vision, double vision and visual disturbance.   Cardiovascular: Positive for dyspnea on exertion and leg swelling (left foot and ankle). Negative for chest pain, claudication, irregular heartbeat, near-syncope, orthopnea, palpitations, paroxysmal nocturnal dyspnea and syncope.   Respiratory: Positive for cough and sputum production. Negative for hemoptysis, shortness of breath and wheezing.    Hematologic/Lymphatic: Positive for bleeding problem. Negative for adenopathy. Bruises/bleeds easily.   Skin: Positive for poor wound healing. Negative for color change, nail changes and rash.   Musculoskeletal: Positive for arthritis, back pain, joint pain, joint swelling and muscle cramps. Negative for falls.   Gastrointestinal: Positive for constipation. Negative for abdominal pain, dysphagia and heartburn.   Genitourinary: Negative for flank pain.   Neurological: Positive for dizziness, light-headedness and weakness. Negative for brief paralysis, disturbances in coordination, focal weakness, headaches, loss of balance, numbness, paresthesias, sensory change and vertigo.   Psychiatric/Behavioral: Positive for depression. Negative for suicidal ideas. The patient has insomnia and is nervous/anxious.    Allergic/Immunologic: Negative for persistent infections.       I have reviewed the following portions of the patient's history: allergies, current medications, past family history, past medical history, past social history, past surgical history, problem list and ROS and confirm it's accurate.    Allergies:  No Known Allergies    Medications:      Current Outpatient Medications:   •  aspirin 81 MG EC tablet, Take 1 tablet by mouth Daily., Disp: 30  tablet, Rfl: 6  •  cilostazol (PLETAL) 50 MG tablet, Take 1 tablet by mouth 2 (Two) Times a Day., Disp: 180 tablet, Rfl: 0  •  Cyanocobalamin (VITAMIN B 12 PO), Take  by mouth., Disp: , Rfl:   •  DULoxetine (CYMBALTA) 60 MG capsule, TAKE 1 CAPSULE BY MOUTH EVERY DAY, Disp: 90 capsule, Rfl: 1  •  Fluticasone-Umeclidin-Vilant (TRELEGY) 100-62.5-25 MCG/INH inhaler, Inhale 1 puff Daily., Disp: 60 each, Rfl: 2  •  MILK THISTLE PO, Take  by mouth., Disp: , Rfl:   •  traZODone (DESYREL) 150 MG tablet, Take 150 mg by mouth Every Night., Disp: , Rfl:   •  albuterol sulfate  (90 Base) MCG/ACT inhaler, INHALE 2 PUFFS BY MOUTH EVERY 4 HOURS AS NEEDED FOR WHEEZING (Patient not taking: No sig reported), Disp: 18 g, Rfl: 0  •  amoxicillin-clavulanate (Augmentin) 875-125 MG per tablet, Take 1 tablet by mouth 2 (Two) Times a Day. (Patient not taking: No sig reported), Disp: 14 tablet, Rfl: 0  •  atorvastatin (LIPITOR) 20 MG tablet, Take 1 tablet by mouth Daily. (Patient not taking: No sig reported), Disp: 30 tablet, Rfl: 11  •  linaclotide (Linzess) 290 MCG capsule capsule, Take 1 capsule by mouth Every Morning Before Breakfast. (Patient not taking: No sig reported), Disp: 14 capsule, Rfl: 0  •  loratadine (CLARITIN) 10 MG tablet, Take 10 mg by mouth Daily., Disp: , Rfl:   •  methylPREDNISolone (MEDROL) 4 MG dose pack, Take as directed on package instructions. (Patient not taking: No sig reported), Disp: 21 tablet, Rfl: 0  •  mirtazapine (REMERON) 7.5 MG tablet, Take 1 tablet by mouth Every Night. (Patient not taking: No sig reported), Disp: 30 tablet, Rfl: 1  •  nicotine (Nicoderm CQ) 14 MG/24HR patch, Place 1 patch on the skin as directed by provider Daily. (Patient not taking: No sig reported), Disp: 28 patch, Rfl: 0  •  ondansetron (Zofran) 4 MG tablet, Take 1 tablet by mouth Every 8 (Eight) Hours As Needed for Nausea or Vomiting. (Patient not taking: No sig reported), Disp: 20 tablet, Rfl: 0  •  polyethylene glycol  (MIRALAX) packet, Take 17 g by mouth Daily. 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   • Wears dentures    • Wears eyeglasses        Past Surgical History:   Procedure Laterality Date   • AORTAGRAM N/A 3/18/2021    Procedure: AORTAGRAM WITH RUNOFFS;  Surgeon: Jarad Andrade MD;  Location:  [facility];  Service: Vascular;  Laterality: N/A;   fluoro3 min 30 sec  mgy 19  visi 30ml   • BACK SURGERY     • BRAIN SURGERY      [facility]   • CARDIAC CATHETERIZATION Left 5/28/2021    Procedure: Left Heart Cath via right radial artery;  Surgeon: Maximino Jernigan MD;  Location:  [facility] LOCATION;  Service: Cardiovascular;  Laterality: Left;   • CATARACT EXTRACTION Bilateral 2013   • COLONOSCOPY      1 year ago   • DILATATION AND CURETTAGE     • KNEE SURGERY Right    • MASTECTOMY Bilateral    • TOTAL HIP ARTHROPLASTY Right 5/1/2017    Procedure: RIGHT TOTAL HIP ARTHROPLASTY ANTERIOR;  Surgeon: Nii Nayak MD;  Location:  [facility];  Service:        Social History     Socioeconomic History   • Marital status:    • Number of children: 3   Tobacco Use   • Smoking status: Current Every Day Smoker     Packs/day: 1.00     Years: 50.00     Pack years: 50.00   • Smokeless tobacco: Never Used   Substance and Sexual Activity   • Alcohol use: Yes     Alcohol/week: 28.0 standard drinks     Types: 28 Shots of liquor per week     Comment: hx of alcohol abuse drinking vodka daily   • Drug use: No   • Sexual activity: Defer        Family History   Problem Relation Age of Onset   • No Known Problems Mother    • Alcohol abuse Father    • No  "Known Problems Sister    • No Known Problems Brother    • No Known Problems Daughter    • No Known Problems Son        Objective     Physical Exam:  Vitals:    04/13/22 1445   BP: 114/70   Pulse: 110   Temp: 97.8 °F (36.6 °C)   SpO2: 98%   Weight: 40.9 kg (90 lb 3.2 oz)   Height: 152.4 cm (60\")      Body mass index is 17.62 kg/m².    Physical Exam  Vitals and nursing note reviewed.   Constitutional:       Appearance: Normal appearance. She is well-developed.   HENT:      Head: Normocephalic and atraumatic.   Eyes:      Pupils: Pupils are equal, round, and reactive to light.   Neck:      Vascular: No carotid bruit.   Cardiovascular:      Rate and Rhythm: Normal rate and regular rhythm.      Pulses: Normal pulses.      Heart sounds: Normal heart sounds, S1 normal and S2 normal. No murmur heard.     Comments: Right/Left DP/PT: loud venous hum noted on exam  Pulmonary:      Effort: Pulmonary effort is normal.      Breath sounds: Normal breath sounds.   Abdominal:      Palpations: Abdomen is soft.   Musculoskeletal:         General: No swelling.      Cervical back: Neck supple.      Right lower leg: No edema.      Left lower leg: No edema.   Skin:     General: Skin is warm and dry.      Capillary Refill: Capillary refill takes less than 2 seconds.      Findings: No bruising.      Comments: Abrasions to patient's left shin; significant dark rubor discoloration noted to left foot without evidence of nonhealing ulceration; see image below    Neurological:      General: No focal deficit present.      Mental Status: She is alert and oriented to person, place, and time. Mental status is at baseline.      GCS: GCS eye subscore is 4. GCS verbal subscore is 5. GCS motor subscore is 6.      Motor: Motor function is intact.      Coordination: Coordination is intact.      Gait: Gait is intact.   Psychiatric:         Mood and Affect: Mood normal.         Speech: Speech normal.         Behavior: Behavior normal. Behavior is " cooperative.         Cognition and Memory: Cognition normal.         Imaging/Labs:    CT Angio Abdominal Aorta Bilateral Iliofem Runoff    Result Date: 3/26/2022   1. Overall similar appearance of the vasculature compared to one year prior. 2. Moderate stenosis of the bilateral renal arteries 3. High-grade stenosis of the cysts SMA with moderate stenosis of the celiac artery 4. Unchanged chronic occlusion of the right common and external iliac artery with severe multifocal stenosis of the left common and external iliac artery. 5. Chronic occlusion of the bilateral SFAs and severely diseased above-the-knee popliteal arteries bilaterally. 6. Two-vessel runoff to the bilateral lower extremities. 7. Chronic occlusion of the proximal bilateral internal iliac arteries.  This report was finalized on 3/26/2022 1:12 PM by Edgardo Garcia MD.             Assessment / Plan      Assessment / Plan:  Diagnoses and all orders for this visit:    1. PAD (peripheral artery disease) (HCC) (Primary)    2. Ischemic rest pain of lower extremity       1. PAD: Previous CTA imaging demonstrated severe infrarenal atherosclerosis, occluded right iliac, occluded left SFA and right SFA arteries.  Unsuccessful aortogram March 2021.  Patient was felt to be poor surgical candidate for revascularization per Dr. Andrade and conservative measures were recommended.  Patient continues to report LLE pain with ambulation that is difficult to quantify and not exactly congruent with typical claudication pain.  The LLE erythema that was present at last visit has actually improved and there is loud venous hum noted in BLE DP/PT sites that was not detectable at last visit.  Case and most recent CTA imaging reviewed with Dr. Alejandra today revealing overall similar appearance of patient's vascular disease compared to 1 year ago with moderate stenosis of bilateral renal arteries, unchanged chronic occlusion of the right common and external iliac artery with severe  "multifocal stenosis of the left common and external iliac artery, chronic occlusion of bilateral SFAs and severely diseased above-the-knee popliteal arteries bilaterally with two-vessel runoff to BLE as well as chronic occlusion of proximal bilateral internal iliac arteries.  Due to patient's extensive PVD, Dr. Alejandra does not feel that the patient has any options for revascularization or warrants further evaluation with aortogram.  He recommends conservative measures and watchful waiting.  If patient were to develop nonhealing ulceration or severe/life limiting pain in either LE, would likely require amputation.  Discussed these recommendations with patient.  She is adamant that she does not want \"to be cut on or have an amputation ever\".  Continue ASA/Pletal therapy.  Encouraged patient to monitor for nonhealing ulcerations or lower extremity discoloration that does not resolve with elevation.  We will plan to see the patient back in 6 months for reassessment and to ensure patient does not have any new nonhealing ulcerations or limb threat needing ischemia.    Follow Up:   Return in about 6 months (around 10/13/2022).   Or sooner for any further concerns or worsening sign and symptoms. If unable to reach us in the office please dial 911 or go to the nearest emergency department.      Susan ROTHMAN  [facility] Cardiothoracic Surgery    Time Spent: I spent 30 minutes caring for Shirley on this date of service. This time includes time spent by me in the following activities: preparing for the visit, reviewing tests, obtaining and/or reviewing a separately obtained history, performing a medically appropriate examination and/or evaluation, counseling and educating the patient/family/caregiver, referring and communicating with other health care professionals, documenting information in the medical record, independently interpreting results and communicating that information with the patient/family/caregiver " and care coordination.

## 2022-05-12 NOTE — TELEPHONE ENCOUNTER
Pt takes 1 q pm. Said it was last prescribed by Lara Kline but she's been without it for 'sometime now'     Pt transferred to front.

## 2022-05-12 NOTE — TELEPHONE ENCOUNTER
Please call.  We received a refill request for trazodone.  She has not had that refilled in quite some time.  Please confirm that she is taking on a regular basis and in addition, she is due for follow-up office visit.  Let me know.

## 2022-07-13 NOTE — TELEPHONE ENCOUNTER
Caller: Shirley Calhoun    Relationship: Self    Best call back number: [phone]    Requested Prescriptions:   Requested Prescriptions     Pending Prescriptions Disp Refills   • DULoxetine (CYMBALTA) 60 MG capsule 90 capsule 1     Sig: Take 1 capsule by mouth Daily.        Pharmacy where request should be sent:[facility] [phone]      Additional details provided by patient: PATIENT IS OUT OF MEDICATION. PATIENT HAS BEEN WITHOUT OFR 3 DAYS    Does the patient have less than a 3 day supply:  [x] Yes  [] No    Joel Blount Rep   07/13/22 14:38 EDT

## 2022-07-13 NOTE — PROGRESS NOTES
Subjective:     Encounter Date:07/13/2022    Primary Care Physician: Arvind Foley MD      Patient ID: Shirley Calhoun is a 82 y.o. female.    Chief Complaint:Follow-up    PROBLEM LIST:  1. Dyspnea on exertion  1. 5/28/2021.  Attempted cardiac catheterization aborted due to inability in central circulation from right radial artery and patient's noncompliance.  Known occluded femoral arteries and left subclavian.  2. PAD  a. AA with runoff Dr. Andrade left iliofemoral system nearly impossible.  Subintimal dissection that did not compromise flow to the native aorta.  CTA demonstrated severe infrarenal atherosclerosis.  Occluded right iliac and occluded left and right SFA.  3. Dyslipidemia  4. Ongoing tobacco abuse  5. COPD  6. Breast cancer  a. Bilateral mastectomy  b. No chemo or XRT  7. Skin cancer with removal  8. GERD  9. Vertigo  10. Depression  11. History of subdural hematoma  a. Surgical intervention  12. Surgeries:  a. Lumbar back surgery  b. Right total hip replacement  c. Left knee surgery  d. Bilateral mastectomy   e. Cataract extraction      No Known Allergies      Current Outpatient Medications:   •  aspirin 81 MG EC tablet, Take 1 tablet by mouth Daily., Disp: 30 tablet, Rfl: 6  •  cilostazol (PLETAL) 50 MG tablet, Take 1 tablet by mouth 2 (Two) Times a Day., Disp: 180 tablet, Rfl: 0  •  Cyanocobalamin (VITAMIN B 12 PO), Take  by mouth., Disp: , Rfl:   •  DULoxetine (CYMBALTA) 60 MG capsule, TAKE 1 CAPSULE BY MOUTH EVERY DAY, Disp: 90 capsule, Rfl: 1  •  Fluticasone-Umeclidin-Vilant (TRELEGY) 100-62.5-25 MCG/INH inhaler, Inhale 1 puff Daily., Disp: 60 each, Rfl: 2  •  loratadine (CLARITIN) 10 MG tablet, Take 10 mg by mouth Daily., Disp: , Rfl:   •  MILK THISTLE PO, Take  by mouth., Disp: , Rfl:   •  ondansetron (Zofran) 4 MG tablet, Take 1 tablet by mouth Every 8 (Eight) Hours As Needed for Nausea or Vomiting., Disp: 20 tablet, Rfl: 0  •  traZODone (DESYREL) 150 MG tablet, Take 1 tablet by mouth Every  "Night., Disp: 30 tablet, Rfl: 1        History of Present Illness    Patient returns today for follow-up, annual, diffuse atherosclerosis.  Since her last visit, she is stopped her statin, for reasons that are unclear.  Her main complaint today is of Dr. Andrade her vascular surgeon, leaving town.  She has ongoing constant leg pain, not [employer] worse with exertion, and so but is clearly osteoarthritis pain.  Nonetheless she continues to smoke.  She has no intention of quitting.  She does have baseline functional class II-3 dyspnea.  No chest pain orthopnea PND.  Does have some mild raves on her left lower extremity, but none of which appear infected.  These are healing, although she notes it is \"slow\".  She wishes a new vascular surgeon.    The following portions of the patient's history were reviewed and updated as appropriate: allergies, current medications, past family history, past medical history, past social history, past surgical history and problem list.      Social History     Tobacco Use   • Smoking status: Current Every Day Smoker     Packs/day: 1.00     Years: 50.00     Pack years: 50.00   • Smokeless tobacco: Never Used   Substance Use Topics   • Alcohol use: Yes     Alcohol/week: 28.0 standard drinks     Types: 28 Shots of liquor per week     Comment: hx of alcohol abuse drinking vodka daily   • Drug use: No         ROS       Objective:   /60   Pulse 86   Ht 152.4 cm (60\")   Wt 38.6 kg (85 lb)   LMP  (LMP Unknown)   SpO2 96%   BMI 16.60 kg/m²         Vitals reviewed.   Constitutional:       Appearance: Well-developed and not in distress. Frail.   Neck:      Vascular: No JVD.      Trachea: No tracheal deviation.   Pulmonary:      Effort: Pulmonary effort is normal.      Comments: Decreased breath sounds throughout  Cardiovascular:      Normal rate. Regular rhythm.   Pulses:     Radial: 1+ on the left side and 2+ on the right side.     Dorsalis pedis: 0 bilaterally.     Posterior tibial: 0 " bilaterally.  Abdominal:      General: Bowel sounds are normal.      Palpations: Abdomen is soft.      Tenderness: There is no abdominal tenderness.   Musculoskeletal:         General: No deformity. Skin:     General: Skin is warm and dry.   Neurological:      Mental Status: Alert and oriented to person, place, and time.         Procedures          Assessment:   Assessment & Plan      Diagnoses and all orders for this visit:    1. Coronary artery disease involving native coronary artery of native heart without angina pectoris (Primary)    2. PAD (peripheral artery disease) (HCC)    3. Tobacco abuse    4. Panlobular emphysema (HCC)      1.  Coronary artery disease, unable to be assessed by cath due to inability to navigate to the central circulation.  Left subclavian illusion and aortic occlusion, and right radial artery loop which we are unable to navigate catheters through.  2.  Peripheral arterial disease, severe aorto iliac disease bilaterally and class IV symptoms.  3.  COPD  4.  Ongoing tobacco use, no intention of quitting  5.  Dyslipidemia patient is off statin for reasons that are unclear to us, suspect noncompliance    Recommendations:  1.  Resume atorvastatin 40 mg nightly  2.  Tobacco counseling given, patient not interested.  3.  Will refer to [facility]/Dr. Urbina for ongoing vascular evaluation.  Unclear if she is a surgical candidate.  4.  Discussed need for wound care/foot management/frequent evaluation.    5.  Revisit annually apparent symptom change       Maximino Jernigan MD        Dictated utilizing Dragon dictation

## 2022-07-13 NOTE — TELEPHONE ENCOUNTER
Gave 30 day patient needs to be seen by someone in office. Informed her and sent up front to schedule.

## 2022-08-01 NOTE — TELEPHONE ENCOUNTER
Caller: Shirley Calhoun    Relationship: Self    Best call back number: [phone]    Requested Prescriptions:   Requested Prescriptions     Pending Prescriptions Disp Refills   • cilostazol (PLETAL) 50 MG tablet 180 tablet 0     Sig: Take 1 tablet by mouth 2 (Two) Times a Day.   • traZODone (DESYREL) 150 MG tablet 30 tablet 1     Sig: Take 1 tablet by mouth Every Night.        Pharmacy where request should be sent: [facility][id number] [address] AT [facility] - [phone] [facility] [phone] FX     Additional details provided by patient: PATIENT IS OUT AND NEEDS REFILLED ASAP. HER NEXT APPOINTMENT APPOINTMENT IS 08/09/22 SO IF NOT A FULL A PARTIAL REFILL. PLEASE ADVISE      Does the patient have less than a 3 day supply:  [x] Yes  [] No    Joel Alvarenga Rep   08/01/22 14:55 EDT

## 2022-08-09 NOTE — PROGRESS NOTES
Subjective   Shirley Calhoun is a 78 y.o. female.     History of Present Illness   C/O trouble swallowing, abdominal pain and diarrhea worsening over the past few days  Black tarry stools at least a couple days; no bright red blood in the stool  Hurts when she eats in her chest, been drinking Ensure and soup because regular food does not want to go down; this has been going on for a few months  RUQ, LUQ and LLQ pain, indigestion  Having some nausea and vomiting of bile, no blood in emesis  Smokes 1 PPD since age 16  + hx of COPD  Drinks  2-3 glasses of vodka daily for many years of alcohol abuse  + hx of bowel resection, breast cancer with bilateral mastectomy Dr Evita Palencia hx of depression and insomnia  She lives alone. But has friends  She is afraid something is very wrong    The following portions of the patient's history were reviewed and updated as appropriate: allergies, current medications, past family history, past medical history, past social history, past surgical history and problem list.    Review of Systems   Constitutional: Positive for unexpected weight loss. Negative for chills and fever.   Respiratory: Positive for cough. Negative for chest tightness, shortness of breath and wheezing.    Gastrointestinal: Positive for abdominal pain, blood in stool, diarrhea, nausea, vomiting, GERD and indigestion. Negative for abdominal distention and constipation.   Endocrine: Negative.    Genitourinary: Negative for flank pain and urgency.   Musculoskeletal: Negative for back pain.   Skin: Negative for rash.   Neurological: Negative.  Negative for syncope.   Psychiatric/Behavioral: Negative for depressed mood.       Objective   Physical Exam   Constitutional: She is oriented to person, place, and time. She appears well-developed. No distress.   HENT:   Head: Normocephalic.   Eyes: Lids are normal.   Neck: Neck supple. No thyromegaly present.   Cardiovascular: Normal rate, regular rhythm and normal heart sounds.    Pulmonary/Chest: Effort normal. No tachypnea. No respiratory distress. She has decreased breath sounds.   Abdominal: Soft. Normal appearance. She exhibits abdominal bruit. She exhibits no distension, no fluid wave, no ascites and no mass. Bowel sounds are decreased. There is hepatomegaly. There is no splenomegaly. There is tenderness in the right upper quadrant, left upper quadrant and left lower quadrant. There is no rigidity, no rebound, no guarding and no CVA tenderness. No hernia.   Neurological: She is alert and oriented to person, place, and time.   Skin: Skin is warm and dry. Capillary refill takes less than 2 seconds. Turgor is normal.   Psychiatric: She has a normal mood and affect. Her behavior is normal. Judgment and thought content normal.   Nursing note and vitals reviewed.        Assessment/Plan   Shirley was seen today for difficulty swallowing, lower and mid abdominal pain and diarrhea.    Diagnoses and all orders for this visit:    Difficulty swallowing solids  -     Ambulatory referral for Screening EGD  -     Ambulatory Referral For Screening Colonoscopy    Melena  -     Ambulatory referral for Screening EGD  -     Ambulatory Referral For Screening Colonoscopy    Alcohol abuse, daily use  -     Ambulatory referral for Screening EGD  -     Ambulatory Referral For Screening Colonoscopy    History of small bowel obstruction  -     Ambulatory Referral For Screening Colonoscopy      Referral for EGD/Colonoscopy to evaluate for esophageal strictures or varices, ulcer or GI bleed.   Maintain diet of soft or liquids, encouraged decreased/stopping smoking and drinking of ETOH  If symptoms worsen she is to report to the ED  Patient verbalizes understanding and agrees with plan of care          Attending Only

## 2022-08-09 NOTE — PROGRESS NOTES
"Subjective   Shirley Calhoun is a 82 y.o. female.     History of Present Illness   Former [facility] patient transferring care   Needs refills on her medications   Follows with cardiology for CAD and PAD, however looks like our office has been prescribing her  Medications. Saw cardiology last month. For some reason statin was not being taken by patient and atorvastatin 40 mg was reinitiated. Suffers from pain due to PAD in legs. Has follow up with vascular surgeon in the future   Has COPD. Was using trelegy maintenance inhaler. Long term tobacco abuse. No interest in quitting   Taking duloxetine for depression. Needs refills     Has personal hx of breast cancer and osteoporosis   Hx of anemia. No recent labs in the last year.     Daily alcohol consumption of Vodka. No interest in quitting     The following portions of the patient's history were reviewed and updated as appropriate: allergies, current medications, past family history, past medical history, past social history, past surgical history and problem list.    Review of Systems   Constitutional: Negative for chills and fever.   Respiratory: Negative for cough, shortness of breath and wheezing.    Cardiovascular: Negative for chest pain and leg swelling.   Musculoskeletal: Positive for myalgias.       Objective    Blood pressure 132/54, pulse 106, temperature 98.2 °F (36.8 °C), resp. rate 22, height 152.4 cm (60\"), weight 38.6 kg (85 lb), SpO2 98 %, not currently breastfeeding.     Physical Exam  Vitals and nursing note reviewed.   Constitutional:       Appearance: Normal appearance.   HENT:      Head: Normocephalic.      Right Ear: External ear normal.      Left Ear: External ear normal.      Nose: Nose normal.   Cardiovascular:      Rate and Rhythm: Normal rate and regular rhythm.   Pulmonary:      Effort: Pulmonary effort is normal. No respiratory distress.      Breath sounds: Normal breath sounds. No wheezing or rhonchi.   Skin:     General: Skin is warm " and dry.   Neurological:      Mental Status: She is alert and oriented to person, place, and time.   Psychiatric:         Mood and Affect: Mood normal.         Behavior: Behavior normal.         Thought Content: Thought content normal.         Judgment: Judgment normal.         Assessment & Plan   Diagnoses and all orders for this visit:    1. PAD (peripheral artery disease) ([facility]) (Primary)  -     cilostazol (PLETAL) 50 MG tablet; Take 1 tablet by mouth 2 (Two) Times a Day.  Dispense: 180 tablet; Refill: 1  -     CBC w AUTO Differential  -     Comprehensive metabolic panel    2. Panlobular emphysema (HCC)  -     Fluticasone-Umeclidin-Vilant (TRELEGY) 100-62.5-25 MCG/INH inhaler; Inhale 1 puff Daily.  Dispense: 180 each; Refill: 1  -     CBC w AUTO Differential  -     Comprehensive metabolic panel    3. Primary insomnia  -     traZODone (DESYREL) 150 MG tablet; Take 1 tablet by mouth Every Night.  Dispense: 90 tablet; Refill: 1    4. Current moderate episode of major depressive disorder without prior episode ([facility])  -     DULoxetine (CYMBALTA) 60 MG capsule; Take 1 capsule by mouth Daily.  Dispense: 90 capsule; Refill: 1    5. Anemia, unspecified type  -     Iron Profile  -     Vitamin B12  -     Folate    6. Vitamin D deficiency  -     Vitamin D 25 hydroxy    7. Coronary artery disease due to lipid rich plaque  -     Lipid Panel    labs and refills as outlined in plan   Pt has no interest in stopping her tobacco or alcohol use. Aware that these exacerbate her current medical conditions   F/u as directed

## 2022-08-10 NOTE — TELEPHONE ENCOUNTER
Caller: Shirley Jin    Relationship: Self    Best call back number: [phone]    Caller requesting test results: SHIRLEY JIN    What test was performed: LABS    When was the test performed: 08/09/22    Where was the test performed: OFFICE

## 2022-08-25 NOTE — TELEPHONE ENCOUNTER
Requesting documentation saying patient may proceed with vascular surgery with Dr. Urbina, aorta gram with runoffs, possible bilateral iliac artery intervention.

## 2022-08-27 NOTE — TELEPHONE ENCOUNTER
Given the patient's significant vascular disease she is likely at least moderate cardiac risk.  However, this is overall a low risk procedure.  May proceed without any further cardiac testing.

## 2022-08-31 PROBLEM — I74.09 CHRONIC DISTAL AORTIC OCCLUSION (HCC): Status: ACTIVE | Noted: 2022-01-01

## 2022-09-01 NOTE — THERAPY EVALUATION
Patient Name: Shirley Calhoun  : 1940    MRN: [id number]                              Today's Date: 2022       Admit Date: 2022    Visit Dx:     ICD-10-CM ICD-9-CM   1. Chronic distal aortic occlusion (HCC)  I74.09 444.09   2. PAD (peripheral artery disease) ([facility])  I73.9 443.9   3. Impaired functional mobility, balance, gait, and endurance  Z74.09 V49.89     Patient Active Problem List   Diagnosis   • Chronic obstructive pulmonary disease (HCC)   • Depression   • Dizziness of unknown cause   • Fatigue   • Viral URI   • Constipation   • Arthritis of right hip   • Tobacco abuse   • Alcohol abuse, daily use   • Acute blood loss anemia, mild, asymptomatic   • COPD exacerbation ([facility])   • Difficulty swallowing solids   • Melena   • Malignant neoplasm of left female breast ([facility])   • H/O traumatic subdural hematoma   • Age-related osteoporosis without current pathological fracture   • History of small bowel obstruction   • History of colon resection   • History of colon polyps   • Basal cell carcinoma (BCC) of cheek   • Frequent falls   • Major depressive disorder with single episode, in partial remission ([facility])   • Osteoporosis   • Ischemic rest pain of lower extremity   • Coronary artery disease   • PAD (peripheral artery disease) ([facility])   • Coronary artery disease involving native coronary artery of native heart   • Dyspnea on exertion   • Chronic distal aortic occlusion (HCC)     Past Medical History:   Diagnosis Date   • Accidental overdose of nicotine     x2-  overused nicotine patches   • Anemia    • Anxiety    • Arthritis    • Bowel obstruction ([facility])    • COPD (chronic obstructive pulmonary disease) ([facility])    • Depression    • Dizzy    • Eczema     h/o   • Fatigue    • GERD (gastroesophageal reflux disease)    • Heart murmur    • Hip pain    • History of transfusion     x1 after child birth- no reaction recalled   • Hx of colonic polyps    • Leg wound, left     leg- minor - bandaid on currently   • Skin  cancer     on face   • UTI (urinary tract infection)    • Wears dentures     bilat   • Wears eyeglasses      Past Surgical History:   Procedure Laterality Date   • AORTAGRAM N/A 03/18/2021    Procedure: AORTAGRAM WITH RUNOFFS;  Surgeon: Jarad Andrade MD;  Location:  [facility];  Service: Vascular;  Laterality: N/A;   fluoro3 min 30 sec  mgy 19  visi 30ml   • AORTAGRAM N/A 8/31/2022    Procedure: AORTAGRAM WITH RUNOFFS BILATERAL ILIAC ARTERY INTERVENTION STENTS;  Surgeon: Ronak Urbina MD;  Location:  [facility];  Service: Vascular;  Laterality: N/A;  flouro 7min  dose  13.47  contrast 50   • BACK SURGERY      cleaned up   • BRAIN SURGERY      SDH   • CARDIAC CATHETERIZATION Left 05/28/2021    Procedure: Left Heart Cath via right radial artery;  Surgeon: Maximino Jernigan MD;  Location:  [facility];  Service: Cardiovascular;  Laterality: Left;   • CATARACT EXTRACTION Bilateral 2013   • COLONOSCOPY      1 year ago   • DILATATION AND CURETTAGE     • KNEE SURGERY Right     cleaned up   • MASTECTOMY Bilateral     2016   • TOTAL HIP ARTHROPLASTY Right 05/01/2017    Procedure: RIGHT TOTAL HIP ARTHROPLASTY ANTERIOR;  Surgeon: Nii Nayak MD;  Location: [facility] OR;  Service:       General Information     Row Name 09/01/22 1436          Physical Therapy Time and Intention    Document Type evaluation  -     Mode of Treatment individual therapy;physical therapy  -     Row Name 09/01/22 1436          General Information    Patient Profile Reviewed yes  -     Prior Level of Function independent:;all household mobility;gait;transfer;bed mobility;min assist:;ADL's  lives alone, has regular caregiver assistance  -     Existing Precautions/Restrictions fall;oxygen therapy device and L/min  -     Barriers to Rehab previous functional deficit  -     Row Name 09/01/22 1436          Living Environment    People in Home alone  -     Row Name 09/01/22 1436          Home Main Entrance     Number of Stairs, Main Entrance none  -SJ     Row Name 09/01/22 1436          Stairs Within Home, Primary    Number of Stairs, Within Home, Primary none  -SJ     Row Name 09/01/22 1436          Cognition    Orientation Status (Cognition) oriented x 3  -SJ     Row Name 09/01/22 1436          Safety Issues, Functional Mobility    Safety Issues Affecting Function (Mobility) impulsivity;friction/shear risk;insight into deficits/self-awareness;sequencing abilities;safety precaution awareness;problem-solving  -     Impairments Affecting Function (Mobility) balance;pain;strength  -           User Key  (r) = Recorded By, (t) = Taken By, (c) = Cosigned By    Initials Name Provider Type    Priscila Alvarenga PT Physical Therapist               Mobility     Row Name 09/01/22 1532          Bed Mobility    Bed Mobility supine-sit;sit-supine  -SJ     Supine-Sit [place] (Bed Mobility) modified independence  -SJ     Sit-Supine [place] (Bed Mobility) modified independence  -SJ     Assistive Device (Bed Mobility) bed rails;head of bed elevated  -     Comment, (Bed Mobility) pt able to complete with extra time and effort  -     Row Name 09/01/22 1532          Transfers    Comment, (Transfers) STS from EOB with RW however pt sat down abruptly due to severe RLE pain. Pt then performed sit-pivot t/f to BSC with Arie. Attempted STS again from BSC with pt sitting again abruptly due to pain, then t/f back to bed with Arie.  -     Row Name 09/01/22 1532          Bed-Chair Transfer    Bed-Chair [place] (Transfers) minimum assist (75% patient effort)  -     Row Name 09/01/22 1532          Sit-Stand Transfer    Sit-Stand [place] (Transfers) moderate assist (50% patient effort);1 person assist  -     Assistive Device (Sit-Stand Transfers) walker, front-wheeled  -     Row Name 09/01/22 1532          Gait/Stairs (Locomotion)    [place] Level (Gait) not tested  -     Comment, (Gait/Stairs) unable to  assess due to pain  -SJ           User Key  (r) = Recorded By, (t) = Taken By, (c) = Cosigned By    Initials Name Provider Type    Priscila Alvarenga PT Physical Therapist               Obj/Interventions     Row Name 09/01/22 1534          Range of Motion Comprehensive    General Range of Motion bilateral lower extremity ROM WFL  -[facility] Name 09/01/22 1534          Strength Comprehensive (MMT)    General Manual Muscle Testing (MMT) Assessment lower extremity strength deficits identified  -[facility] Name 09/01/22 1534          Balance    Balance Assessment sitting static balance;standing static balance  -SJ     Static Sitting Balance supervision  -SJ     Position, Sitting Balance sitting edge of bed;unsupported  -SJ     Static Standing Balance moderate assist  -SJ     Position/Device Used, Standing Balance supported  -SJ           User Key  (r) = Recorded By, (t) = Taken By, (c) = Cosigned By    Initials Name Provider Type    Priscila Alvarenga PT Physical Therapist               Goals/Plan     Row Name 09/01/22 1540          Bed Mobility Goal 1 (PT)    Activity/Assistive Device (Bed Mobility Goal 1, PT) sit to supine;supine to sit  -SJ     [place] Level/Cues Needed (Bed Mobility Goal 1, PT) modified independence  -SJ     Time Frame (Bed Mobility Goal 1, PT) long term goal (LTG);2 weeks  -[facility] Name 09/01/22 1540          Transfer Goal 1 (PT)    Activity/Assistive Device (Transfer Goal 1, PT) sit-to-stand/stand-to-sit;bed-to-chair/chair-to-bed  -SJ     [place] Level/Cues Needed (Transfer Goal 1, PT) modified independence  -SJ     Time Frame (Transfer Goal 1, PT) long term goal (LTG);2 weeks  -SJ     [facility] Name 09/01/22 1540          Gait Training Goal 1 (PT)    Activity/Assistive Device (Gait Training Goal 1, PT) gait (walking locomotion);forward stepping;walker, rolling  -SJ     [place] Level (Gait Training Goal 1, PT) contact guard required  -SJ     Distance (Gait Training Goal 1, PT)  50ft  -     Time Frame (Gait Training Goal 1, PT) long term goal (LTG);2 weeks  -[facility] Name 09/01/22 1430          Therapy Assessment/Plan (PT)    Planned Therapy Interventions (PT) balance training;bed mobility training;gait training;home exercise program;patient/family education;transfer training;strengthening  -           User Key  (r) = Recorded By, (t) = Taken By, (c) = Cosigned By    Initials Name Provider Type     Priscila Gaines, PT Physical Therapist               Clinical Impression     [facility] Name 09/01/22 153          Pain    Additional Documentation Pain Scale: FACES Pre/Post-Treatment (Group)  -[facility] Name 09/01/22 1535          Pain Scale: FACES Pre/Post-Treatment    Pain: FACES Scale, Pretreatment 2-->hurts little bit  -     Posttreatment Pain Rating 8-->hurts whole lot  -[facility] Name 09/01/22 0805          Plan of Care Review    Plan of Care Reviewed With patient  -     Outcome Evaluation PT eval completed. Pt presents with pain s/p aortogram and BLE stent placement due to claudication. Pt able to perform bed mobility with supervision. Pt attempted STS from EOB with RW however pt sat down abruptly due to severe RLE pain. Pt then performed sit-pivot t/f to BSC with Arie. Attempted STS again from BSC with pt sitting again abruptly due to pain, then t/f back to bed with Arie. Pt unable to ambulate this date. PT does not rec d/c home at this time. Pt may benefit from d/c rehab for max functional mobility independence, as pt lives alone with caregiver support.  -[facility] Name 09/01/22 3826          Therapy Assessment/Plan (PT)    Patient/Family Therapy Goals Statement (PT) decrease pain  -     Rehab Potential (PT) good, to achieve stated therapy goals  -     Criteria for Skilled Interventions Met (PT) yes;skilled treatment is necessary  -     Therapy Frequency (PT) daily  -     Predicted Duration of Therapy Intervention (PT) 2wks  -[facility] Name 09/01/22 9803           Vital Signs    Pre Systolic BP Rehab 111  -SJ     Pre Treatment Diastolic BP 54  -SJ     Intra Systolic BP Rehab 107  -SJ     Intra Treatment Diastolic BP 55  -SJ     Pretreatment Heart Rate (beats/min) 90  -SJ     Posttreatment Heart Rate (beats/min) 79  -SJ     Pre SpO2 (%) 92  -SJ     O2 Delivery Pre Treatment room air  -SJ     Intra SpO2 (%) 88  -SJ     O2 Delivery Intra Treatment room air  -SJ     Post SpO2 (%) 94  2L  -SJ     O2 Delivery Post Treatment nasal cannula  -SJ     Pre Patient Position Supine  -SJ     Post Patient Position Supine  -SJ     Row Name 09/01/22 1535          Positioning and Restraints    Pre-Treatment Position in bed  -SJ     Post Treatment Position bed  -SJ     In Bed notified nsg;fowlers;call light within reach;encouraged to call for assist;exit alarm on;with family/caregiver  -           User Key  (r) = Recorded By, (t) = Taken By, (c) = Cosigned By    Initials Name Provider Type    Priscila Alvarenga, PT Physical Therapist               Outcome Measures     Row Name 09/01/22 1541 09/01/22 0827       How much help from another person do you currently need...    Turning from your back to your side while in flat bed without using bedrails? 4  -SJ 3  -JG    Moving from lying on back to sitting on the side of a flat bed without bedrails? 4  -SJ 3  -JG    Moving to and from a bed to a chair (including a wheelchair)? 3  -SJ 3  -JG    Standing up from a chair using your arms (e.g., wheelchair, bedside chair)? 2  -SJ 3  -JG    Climbing 3-5 steps with a railing? 1  - 2  -JG    To walk in hospital room? 1  -SJ 3  -JG    AM-PAC 6 Clicks Score (PT) 15  -SJ 17  -JG    Highest level of mobility 4 --> Transferred to chair/commode  - 5 --> Static standing  -JG    Row Name 09/01/22 1541          Functional Assessment    Outcome Measure Options AM-PAC 6 Clicks Basic Mobility (PT)  -           User Key  (r) = Recorded By, (t) = Taken By, (c) = Cosigned By    Initials Name Provider Type      Priscila Gaines, STEPHAN Physical Therapist    Maria Elena Ramírez, ANASTASIIAN Licensed Nurse                             Physical Therapy Education                 Title: PT OT SLP Therapies (In Progress)     Topic: Physical Therapy (In Progress)     Point: Mobility training (In Progress)     Learning Progress Summary           Patient Acceptance, E, NR by  at 9/1/2022 1541   Caregiver Acceptance, E, NR by  at 9/1/2022 1541                   Point: Home exercise program (Not Started)     Learner Progress:  Not documented in this visit.          Point: Body mechanics (In Progress)     Learning Progress Summary           Patient Acceptance, E, NR by  at 9/1/2022 1541   Caregiver Acceptance, E, NR by  at 9/1/2022 1541                   Point: Precautions (In Progress)     Learning Progress Summary           Patient Acceptance, E, NR by  at 9/1/2022 1541   Caregiver Acceptance, E, NR by  at 9/1/2022 1541                               User Key     Initials Effective Dates Name Provider Type Discipline     06/16/21 -  Priscila Gaines PT Physical Therapist PT              PT Recommendation and Plan  Planned Therapy Interventions (PT): balance training, bed mobility training, gait training, home exercise program, patient/family education, transfer training, strengthening  Plan of Care Reviewed With: patient  Outcome Evaluation: PT eval completed. Pt presents with pain s/p aortogram and BLE stent placement due to claudication. Pt able to perform bed mobility with supervision. Pt attempted STS from EOB with RW however pt sat down abruptly due to severe RLE pain. Pt then performed sit-pivot t/f to BSC with Arie. Attempted STS again from BSC with pt sitting again abruptly due to pain, then t/f back to bed with Arie. Pt unable to ambulate this date. PT does not rec d/c home at this time. Pt may benefit from d/c rehab for max functional mobility independence, as pt lives alone with caregiver support.     Time  Calculation:    PT Charges     Row Name 09/01/22 1542             Time Calculation    Start Time 1436  -      PT Non-Billable Time (min) 51 min  -      PT Received On 09/01/22  -      PT Goal Re-Cert Due Date 09/11/22  -              Time Calculation- PT    Total Timed Code Minutes- PT 16 minute(s)  -              Timed Charges    22728 - PT Therapeutic Activity Minutes 16  -SJ              Total Minutes    Timed Charges Total Minutes 16  -SJ       Total Minutes 16  -SJ            User Key  (r) = Recorded By, (t) = Taken By, (c) = Cosigned By    Initials Name Provider Type    SJ Priscila Gaines, PT Physical Therapist              Therapy Charges for Today     Code Description Service Date Service Provider Modifiers Qty    67428587941 HC PT THERAPEUTIC ACT EA 15 MIN 9/1/2022 Priscila Gaines, PT GP 1    14819396462 HC PT EVAL MOD COMPLEXITY 4 9/1/2022 Priscila Gaines PT GP 1          PT G-Codes  Outcome Measure Options: AM-PAC 6 Clicks Basic Mobility (PT)  AM-PAC 6 Clicks Score (PT): 15    Priscila Gaines PT  9/1/2022

## 2022-09-01 NOTE — PROGRESS NOTES
Patient was planning for discharge pending ability to ambulate. Patient worked with physical therapy and could not ambulate due to pain. Decision was made to cancel discharge order and consult case management for discharge planning. Recommendation is for patient to be discharged to inpatient rehabilitation or skilled nursing as she lives independently.

## 2022-09-01 NOTE — DISCHARGE SUMMARY
Addendum: Norco 5mg q 6hrs prn #10 was sent in to [facility] to use upon discharge by Dr. Urbina      Date of Discharge:  9/1/2022    Discharge Diagnosis: Aortic and Iliac artery stenosis    Presenting Problem/History of Present Illness  Active Hospital Problems    Diagnosis  POA   • Chronic distal aortic occlusion (HCC) [I74.09]  Yes      Resolved Hospital Problems   No resolved problems to display.          Hospital Course  Patient is a 82 y.o. female presented with Infrarenal aortic occlusion with ischemia of the bilateral lower extremities.  She was admitted under the services of Dr. Ronak Urbina. After informed consent was given, the patient was taken to the operating room where she underwent an aortic stent placement with bilateral common and external iliac artery stents. Post procedure, she was admitted to telemetry for monitoring and pain management. Post operative day 1 she was doing well and tolerating oral intake well without difficulty. She did have some discomfort to the lower back. After a stable hospital course the patient was ready for transfer back to home. Pain was controlled. Diet and activity were well tolerated.    Procedures Performed    Procedure(s):  AORTAGRAM WITH RUNOFFS BILATERAL ILIAC ARTERY INTERVENTION STENTS  -------------------       Consults:   Consults     No orders found for last 30 day(s).          Pertinent Test Results:   CBC    Results from last 7 days   Lab Units 09/01/22  1027 08/29/22  1543   WBC 10*3/mm3 5.02 5.28   HEMOGLOBIN g/dL 8.8* 11.8*   PLATELETS 10*3/mm3 243 317     BMP   Results from last 7 days   Lab Units 09/01/22  1027 08/29/22  1543   SODIUM mmol/L 135* 139   POTASSIUM mmol/L 3.7 4.3   CHLORIDE mmol/L 101 102   CO2 mmol/L 25.0 28.0   BUN mg/dL 11 17   CREATININE mg/dL 0.45* 0.54*   GLUCOSE mg/dL 88 87        Condition on Discharge:  Stable    Vital Signs  Temp:  [98 °F (36.7 °C)-98.7 °F (37.1 °C)] 98.7 °F (37.1 °C)  Heart Rate:  [74-82] 74  Resp:  [16]  16  BP: ()/(51-67) 111/54    Physical Exam:  AAOx3, NAD, no family at bedside  Respiratory: normal effort, on 2 L supplemental O2 via NC  BILATERAL Groins: soft, access sites c/d/i, no active drainage or bleeding, no hematoma  BILATERAL LE: warm to touch, no edema or erythema, motor and sensory intact, weak palpable DP pulse     Discharge Disposition  Home or Self Care    Discharge Medications     Discharge Medications      New Medications      Instructions Start Date   clopidogrel 75 MG tablet  Commonly known as: Plavix   75 mg, Oral, Daily      HYDROcodone-acetaminophen 5-325 MG per tablet  Commonly known as: NORCO   1 tablet, Oral, Every 6 Hours PRN         Continue These Medications      Instructions Start Date   aspirin 81 MG EC tablet   81 mg, Oral, Daily      atorvastatin 40 MG tablet  Commonly known as: LIPITOR   40 mg, Oral, Daily      DULoxetine 60 MG capsule  Commonly known as: CYMBALTA   60 mg, Oral, Daily      Fluticasone-Umeclidin-Vilant 100-62.5-25 MCG/INH inhaler  Commonly known as: TRELEGY   1 puff, Inhalation, Daily      loratadine 10 MG tablet  Commonly known as: CLARITIN   10 mg, Oral, As Needed      MILK THISTLE PO   1 capsule, Oral, Daily      traZODone 150 MG tablet  Commonly known as: DESYREL   150 mg, Oral, Nightly      VITAMIN B 12 PO   1 capsule, Oral, Daily         Stop These Medications    cilostazol 50 MG tablet  Commonly known as: PLETAL            Discharge Diet:  Resume normal diet     Activity at Discharge:   - No heavy lifting over 10 pounds  - May resume normal diet  - May shower, but keep operative site clean and dry  - No driving until 24 hours after pain medication  - FU in the office of Dr. Urbina on 9/13/2022 @ 9:00AM    Follow-up Appointments  Future Appointments   Date Time Provider Department Center   9/7/2022 12:00 PM Alexi Bronson PA [facility]   10/12/2022 11:30 AM Swathi Guzman APRN [facility]   8/2/2023  9:45 AM Maximino Jernigan MD [facility]  [facility] Tan PA-C  09/01/22  14:14 EDT

## 2022-09-01 NOTE — PROGRESS NOTES
"Clinical Nutrition   Reason For Visit: BMI    Patient Name: Shirley Calhoun  YOB: 1940  MRN: [id number]  Date of Encounter: 09/01/22 11:08 EDT  Admission date: 8/31/2022      Comments:     Pt would likely qualify for malnutrition based on wasting; however pt refused NFPE at this time.     Nutrition Assessment     Admission Problem List:    Chronic distal aortic occlusion (HCC)      Applicable Nutrition-Related Information:    Edentulous  Wound - L lateral 5th toe    Applicable medical tests/procedures since admission:    8/31 - AORTAGRAM WITH RUNOFFS BILATERAL ILIAC ARTERY INTERVENTION stents    PMH: She  has a past medical history of Accidental overdose of nicotine, Anemia, Anxiety, Arthritis, Bowel obstruction (HCC), COPD (chronic obstructive pulmonary disease) (HCC), Depression, Dizzy, Eczema, Fatigue, GERD (gastroesophageal reflux disease), Heart murmur, Hip pain, History of transfusion, colonic polyps, Leg wound, left, Skin cancer, UTI (urinary tract infection), Wears dentures, and Wears eyeglasses.   PSxH: She  has a past surgical history that includes Back surgery; Brain surgery; Mastectomy (Bilateral); Cataract Extraction (Bilateral, 2013); Knee surgery (Right); Dilation and curettage of uterus; Colonoscopy; Total hip arthroplasty (Right, 05/01/2017); Aortagram (N/A, 03/18/2021); Cardiac catheterization (Left, 05/28/2021); and Aortagram (N/A, 8/31/2022).        Reported/Observed/Food/Nutrition Related History     Pt reports historically bad appetite; however consumes ONS TID by physician request. Agreeable to receive while inpatient, however states \"I am going home today\". Reports never being a big person - denies recent weight changes. Denies N/V/D or difficulty chewing/swallowing.    Anthropometrics   Height: 60in  Weight: 86lbs  BMI: 16.8  BMI classification: Underweight:<18.5kg/m2   IBW: 111lbs    UBW: 85-90lbs per hospital wt records    Weight Weight (kg) Weight (lbs) Weight Method VISIT " REPORT   8/31/2022 39.009 kg 86 lb Stated -   8/29/2022 39.2 kg 86 lb 6.7 oz Standing scale -   8/9/2022 38.556 kg 85 lb - Report   7/13/2022 38.556 kg 85 lb - Report   4/13/2022 40.914 kg 90 lb 3.2 oz - Report   3/2/2022 40.824 kg 90 lb - Report       Weight change:   Non-significant ~4% weight loss in 6 months      Nutrition Focused Physical Exam     Unable to perform exam due to: Patient/family declined    Labs reviewed   Labs reviewed: Yes    Results from last 7 days   Lab Units 08/29/22  1543   SODIUM mmol/L 139   POTASSIUM mmol/L 4.3   CHLORIDE mmol/L 102   CO2 mmol/L 28.0   BUN mg/dL 17   CREATININE mg/dL 0.54*   GLUCOSE mg/dL 87   CALCIUM mg/dL 9.7     Results from last 7 days   Lab Units 08/29/22  1543   WBC 10*3/mm3 5.28         Lab Results   Lab Value Date/Time    HGBA1C 4.50 (L) 08/29/2022 1543    HGBA1C 4.40 (L) 05/28/2021 0932     Medications reviewed   Medications reviewed: Yes  Scheduled: sennosides-docusate  GTT: LR @ 9mL/hr, NaCl @ 75mL/hr  PRN: Norco      Current Nutrition Prescription   PO: Diet Regular  Oral Nutrition Supplement: Orders Placed This Encounter      Dietary Nutrition Supplements Boost Plus; chocolate     Average PO intake: 50% of breakfast tray observed      Nutrition Diagnosis     Problem Underweight   Etiology Inadequate protein-energy intake   Signs/Symptoms BMI 16.8       Goal:   General: Nutrition to support treatment  PO: Tolerate PO     Intervention   Intervention: Care plan reviewed, Advise alternate selection, Advised available snacks, Menu provided, Encourage intake, Supplement provided        Monitoring/Evaluation:   Monitoring/Evaluation: PO intake, Supplement intake, Pertinent labs    Shara Pereira, MS, RD, LD  Time Spent: 30min

## 2022-09-01 NOTE — PROGRESS NOTES
LOS: 1 day   Patient Care Team:  Alexi Bronson PA as PCP - General (Physician Assistant)  Maximino Jernigan MD as Consulting Physician (Cardiology)  Jarad Andrade MD as Surgeon (Cardiothoracic Surgery)  Gabriel Bauer DO as Consulting Physician (Pulmonary Disease)  Speedy Osman MD (Dermatology)  Nii Nayak MD as Consulting Physician (Orthopedic Surgery)      Subjective   Ms. Calhoun is alert and lying in bed with no family at bedside. She denies any new concerns or acute events overnight. She does have pain to the lower back area which is managed with pain medication.       History taken from: patient    Objective     Vital Signs  Temp:  [96.3 °F (35.7 °C)-98.7 °F (37.1 °C)] 98.7 °F (37.1 °C)  Heart Rate:  [75-88] 75  Resp:  [16-18] 16  BP: ()/(51-83) 109/53      Physical Exam  AAOx3, NAD, no family at bedside  Respiratory: normal effort, on 2 L supplemental O2 via NC  BILATERAL Groins: soft, access sites c/d/i, no active drainage or bleeding, no hematoma  BILATERAL LE: warm to touch, no edema or erythema, motor and sensory intact, weak palpable DP pulse    Results Review:     I reviewed the patient's new clinical results.  CBC    Results from last 7 days   Lab Units 08/29/22  1543   WBC 10*3/mm3 5.28   HEMOGLOBIN g/dL 11.8*   PLATELETS 10*3/mm3 317     BMP   Results from last 7 days   Lab Units 08/29/22  1543   SODIUM mmol/L 139   POTASSIUM mmol/L 4.3   CHLORIDE mmol/L 102   CO2 mmol/L 28.0   BUN mg/dL 17   CREATININE mg/dL 0.54*   GLUCOSE mg/dL 87          Current Facility-Administered Medications:   •  acetaminophen (TYLENOL) tablet 650 mg, 650 mg, Oral, Q4H PRN, Ronak Urbina MD  •  Enoxaparin Sodium (LOVENOX) syringe 40 mg, 40 mg, Subcutaneous, Daily, Ronak Urbina MD, 40 mg at 09/01/22 0827  •  HYDROcodone-acetaminophen (NORCO) 7.5-325 MG per tablet 1 tablet, 1 tablet, Oral, Q4H PRN, Ronak Urbina MD, 1 tablet at 09/01/22 0869  •  lactated ringers infusion, 9 mL/hr,  Intravenous, Continuous, Mohan Turpin MD, Last Rate: 9 mL/hr at 08/31/22 1331, Restarted at 08/31/22 1410  •  ondansetron (ZOFRAN) tablet 4 mg, 4 mg, Oral, Q6H PRN **OR** ondansetron (ZOFRAN) injection 4 mg, 4 mg, Intravenous, Q6H PRN, Ronak Urbina MD  •  sennosides-docusate (PERICOLACE) 8.6-50 MG per tablet 1 tablet, 1 tablet, Oral, BID, Ronak Urbina MD, 1 tablet at 08/31/22 2056  •  sodium chloride 0.45 % infusion, 75 mL/hr, Intravenous, Continuous, Ronak Urbina MD, Last Rate: 75 mL/hr at 08/31/22 1559, 75 mL/hr at 08/31/22 1559     Assessment & Plan   Infrarenal Aortic Occlusion with ischemia of bilateral lower extremities  - 8/31: (Carlitos) Aortic stent with bilateral common and external iliac artery stents  - Pain control prn  - No new labs to review  - Access sites c/d/i, no drainage  - DC IVF  - Patient tolerating po intake without difficulty  - Mobilize as tolerated  - Stop Pletal, continue with Aspirin, start Plavix  - Plan for discharge to home today with family  - FU in office on 9/13/2022 @ 9:00AM with RASHI      Jennifer Tan PA-C  09/01/22  09:02 EDT

## 2022-09-01 NOTE — DISCHARGE INSTRUCTIONS
- May resume normal diet  - No heavy lifting over 15-20 pounds  - Do not drive unless it has been 24 hours after consuming pain medication  - Stop using Pletal and continue with aspirin and new prescription of Plavix  - May shower  - Keep operative sites clean and dry  - If you start to experience any severe pain, please notify our office and report to the ED

## 2022-09-01 NOTE — PLAN OF CARE
Goal Outcome Evaluation:  Plan of Care Reviewed With: patient        Progress: no change  Outcome Evaluation: Vital signs wnl. Oxygen level greater than 90% on room air -2 liters. Pain medication given as needed. Patient unable to walk due to pain in right leg.Physician assistant aware of pain. Right and left groin sites clean,dry and intact. Will continue to monitor.

## 2022-09-01 NOTE — PLAN OF CARE
Goal Outcome Evaluation:  Plan of Care Reviewed With: patient           Outcome Evaluation: PT eval completed. Pt presents with pain s/p aortogram and BLE stent placement due to claudication. Pt able to perform bed mobility with supervision. Pt attempted STS from EOB with RW however pt sat down abruptly due to severe RLE pain. Pt then performed sit-pivot t/f to BSC with Arie. Attempted STS again from BSC with pt sitting again abruptly due to pain, then t/f back to bed with Arie. Pt unable to ambulate this date. PT does not rec d/c home at this time. Pt may benefit from d/c rehab for max functional mobility independence, as pt lives alone with caregiver support.

## 2022-09-01 NOTE — PLAN OF CARE
Problem: Adult Inpatient Plan of Care  Goal: Plan of Care Review  Outcome: Ongoing, Progressing  Flowsheets (Taken 8/31/2022 2000)  Plan of Care Reviewed With: patient  Outcome Evaluation: Patient alert, oriented x4, vital signs stable. Continuous O2 in progress via nasal canula at 2L/min. No swelling noted to puncture sites in bilateral groin, dried blood  to dressing.  Meal tolerated. FARNAZ saline infusion in progress at 75 ml/hr. Patient made no complaints.  Goal: Patient-Specific Goal (Individualized)  Outcome: Ongoing, Progressing  Goal: Absence of Hospital-Acquired Illness or Injury  Outcome: Ongoing, Progressing  Intervention: Identify and Manage Fall Risk  Recent Flowsheet Documentation  Taken 8/31/2022 1800 by Maria Elena Alston RN  Safety Promotion/Fall Prevention: safety round/check completed  Taken 8/31/2022 1600 by Maria Elena Alston RN  Safety Promotion/Fall Prevention: safety round/check completed  Taken 8/31/2022 1530 by Maria Elena Alston RN  Safety Promotion/Fall Prevention: safety round/check completed  Intervention: Prevent Skin Injury  Recent Flowsheet Documentation  Taken 8/31/2022 1800 by Maria Elena Alston RN  Body Position: position changed independently  Taken 8/31/2022 1600 by Maria Elena Alston RN  Body Position: supine  Taken 8/31/2022 1530 by Maria Elena Alston RN  Body Position: supine  Intervention: Prevent Infection  Recent Flowsheet Documentation  Taken 8/31/2022 1530 by Maria Elena Alston RN  Infection Prevention:   environmental surveillance performed   single patient room provided   rest/sleep promoted  Goal: Optimal Comfort and Wellbeing  Outcome: Ongoing, Progressing  Goal: Readiness for Transition of Care  Outcome: Ongoing, Progressing   Goal Outcome Evaluation:  Plan of Care Reviewed With: patient           Outcome Evaluation: Patient alert, oriented x4, vital signs stable. Continuous O2 in progress via nasal canula at 2L/min. No swelling noted to puncture sites in bilateral groin, dried  blood  to dressing.  Meal tolerated. FARNAZ saline infusion in progress at 75 ml/hr. Patient made no complaints.

## 2022-09-02 NOTE — PROGRESS NOTES
Met with patient she is agreeable to [facility]. Verified pcp. Spoke with Toya Castrejon RN, [facility]-Liaison

## 2022-09-02 NOTE — CASE MANAGEMENT/SOCIAL WORK
Continued Stay Note  [facility]     Patient Name: Shirley Calhoun  MRN: [id number]  Today's Date: 9/2/2022    Admit Date: 8/31/2022     Discharge Plan     Row Name 09/02/22 1627       Plan    Plan Home w/HH    Patient/Family in Agreement with Plan yes    Plan Comments PT rec. rehab, pt refused, agreed to . [facility] for PT/OT & nursing. Asked RN to do resting, ambulatory, & recovery oxygen sats between now and tomorrow. Pt dropped w/ OT but need documentation of above. Donald from [facility] has the order, please call him, if she qualifies.    Final Discharge Disposition Code 06 - home with home health care               Discharge Codes    No documentation.               Expected Discharge Date and Time     Expected Discharge Date Expected Discharge Time    Sep 2, 2022             Ehsan Ahumada RN

## 2022-09-02 NOTE — CASE MANAGEMENT/SOCIAL WORK
"Discharge Planning Assessment  [facility]     Patient Name: Shirley Calhoun  MRN: [id number]  Today's Date: 9/2/2022    Admit Date: 8/31/2022     Discharge Needs Assessment     Row Name 09/02/22 0919       Living Environment    People in Home alone    Current Living Arrangements apartment    Living Arrangement Comments Lives in an ground apt. alone. She has daily caregiver all day, that transports & assists. Scarlett Mehta       Transition Planning    Transportation Anticipated family or friend will provide       Discharge Needs Assessment    Equipment Currently Used at Home cane, straight;walker, rolling               Discharge Plan     Row Name 09/02/22 0920       Plan    Plan Home w/HH    Patient/Family in Agreement with Plan yes    Plan Comments Received consult for rehab referral. Spoke w/pt in room. Insurance confirmed. Not current w/HH. Uses cane & RW @ home. Fills scripts @ [facility]. CM discussed rehab referral, pt adamantly refused. She stated, \"I'm NOT going to rehab, I have a caregiver, I'm fine!\" Discussed if she would be interested in HH, she was agreeable. CM will place HH referral to [facility] first. She has transportation home. Did ask if we could obtain a new PCP for her @ d/c. No other d/c needs @ this time. She has PCP in [place], current w/Alexi Bronson.     Final Discharge Disposition Code 06 - home with home health care              Continued Care and Services - Admitted Since 8/31/2022    Coordination has not been started for this encounter.       Expected Discharge Date and Time     Expected Discharge Date Expected Discharge Time    Sep 1, 2022          Demographic Summary     Row Name 09/02/22 0917       General Information    Admission Type inpatient    Arrived From home    Referral Source other (see comments)    Preferred Language English    General Information Comments No POA or LW paperwork . Needs new PCP in [facility]               Functional Status     Row Name 09/02/22 0918 "       Functional Status    Usual Activity Tolerance moderate       Functional Status, IADL    Medications assistive person    Meal Preparation assistive person    Housekeeping assistive person    Laundry assistive person    Shopping assistive person               Psychosocial    No documentation.                Abuse/Neglect    No documentation.                Legal    No documentation.                Substance Abuse    No documentation.                Patient Forms    No documentation.                   Ehsan Ahumada RN

## 2022-09-02 NOTE — PLAN OF CARE
Goal Outcome Evaluation:  Plan of Care Reviewed With: patient        Progress: improving  Outcome Evaluation: OT eval completed Pt presents with deficits in activity tolerance, pain with standing, and AROM for ADL/functional transfers compared to baseline, CGA STS and CGA steps to chair with cues at FWW, able to completed 10ft desaturation to 85% on RA requiring 2L NC during activity to maintain above 90%, recom IPOT d/c rehab

## 2022-09-02 NOTE — PROGRESS NOTES
LOS: 2 days   Patient Care Team:  Aleix Bronson PA as PCP - General (Physician Assistant)  Maximino Jernigan MD as Consulting Physician (Cardiology)  Jarad Andrade MD as Surgeon (Cardiothoracic Surgery)  Gabriel Bauer DO as Consulting Physician (Pulmonary Disease)  Speedy Osman MD (Dermatology)  Nii Nayak MD as Consulting Physician (Orthopedic Surgery)      Subjective   Ms. Calhoun is alert and lying in bed. She denies any new concerns or acute events overnight. She continues to have pain along the RIGHT groin and was unable to ambulate yesterday with physical therapy. When discussing rehabilitation she is refusing.     History taken from: patient    Objective     Vital Signs  Temp:  [97.8 °F (36.6 °C)-99.1 °F (37.3 °C)] 97.8 °F (36.6 °C)  Heart Rate:  [65-94] 74  Resp:  [16-18] 18  BP: (107-115)/(54-55) 115/55    Physical Exam  AAOx3, NAD, no family at bedside  Respiratory: normal effort, on room air  Abdomen: soft, non-tender  RIGHT Groin: access site c/d/i, tegaderm removed, ecchymosis extending to RIGHT hip, sore to palpation  RIGHT LE: warm to touch, motor and sensory intact, audible femoral and popliteal signal  LEFT Groin: access site with tegaderm- removed, slight oozing from access site, applied 4x4 with tegaderm to groin  LEFT LE: warm to touch, no edema or erythema, motor and sensory intact, audible LIZET and  PTA signal    Results Review:     I reviewed the patient's new clinical results.  CBC    Results from last 7 days   Lab Units 09/01/22  1027 08/29/22  1543   WBC 10*3/mm3 5.02 5.28   HEMOGLOBIN g/dL 8.8* 11.8*   PLATELETS 10*3/mm3 243 317     BMP   Results from last 7 days   Lab Units 09/01/22  1027 08/29/22  1543   SODIUM mmol/L 135* 139   POTASSIUM mmol/L 3.7 4.3   CHLORIDE mmol/L 101 102   CO2 mmol/L 25.0 28.0   BUN mg/dL 11 17   CREATININE mg/dL 0.45* 0.54*   GLUCOSE mg/dL 88 87          Current Facility-Administered Medications:   •  acetaminophen (TYLENOL)  tablet 650 mg, 650 mg, Oral, Q4H PRN, Ronak Urbina MD, 650 mg at 09/02/22 0127  •  Enoxaparin Sodium (LOVENOX) syringe 40 mg, 40 mg, Subcutaneous, Daily, Ronak Urbina MD, 40 mg at 09/02/22 0807  •  HYDROcodone-acetaminophen (NORCO) 7.5-325 MG per tablet 1 tablet, 1 tablet, Oral, Q4H PRN, Ronak Urbina MD, 1 tablet at 09/02/22 0807  •  lactated ringers infusion, 9 mL/hr, Intravenous, Continuous, Mohan Turpin MD, Last Rate: 9 mL/hr at 08/31/22 1331, Restarted at 08/31/22 1410  •  nicotine (NICODERM CQ) 14 MG/24HR patch 1 patch, 1 patch, Transdermal, Q24H, Jennifer Tan PA-C, 1 patch at 09/02/22 0810  •  ondansetron (ZOFRAN) tablet 4 mg, 4 mg, Oral, Q6H PRN **OR** ondansetron (ZOFRAN) injection 4 mg, 4 mg, Intravenous, Q6H PRN, Ronak Urbina MD  •  sennosides-docusate (PERICOLACE) 8.6-50 MG per tablet 1 tablet, 1 tablet, Oral, BID, Ronak Urbina MD, 1 tablet at 09/02/22 0807  •  sodium chloride 0.45 % infusion, 75 mL/hr, Intravenous, Continuous, Ronak Urbina MD, Last Rate: 75 mL/hr at 08/31/22 1559, 75 mL/hr at 08/31/22 1559     Assessment & Plan      Infrarenal Aortic Occlusion with ischemia of bilateral lower extremities  - 8/31: (Carlitos) Aortic stent with bilateral common and external iliac artery stents  - Pain control prn  - Labs reviewed from 9/1, reordered   - Tegaderm replaced to LEFT groin due to slight ooze  - Patient tolerating po intake without difficulty  - Mobilize as tolerated, did not ambulate yesterday with PT. Discussed importance of ambulation in regards to recovery  - Stop Pletal, continue with Aspirin, start Plavix  - Continue heating pad to RIGHT hip  - Dispo: discharge cancelled yesterday due to inability to ambulate with physical therapy. Patient was recommended rehabilitation but refuses. Case management has secured home health for patient upon discharge. Pending ability to ambulate with physical therapy today, may consider discharge this afternoon.   - Post operative pain medication  sent into pharmacy by Dr. Urbina to use upon discharge  - FU in office on 9/13/2022 @ 9:00AM with RASHI Tan PA-C  09/02/22  08:50 EDT

## 2022-09-03 NOTE — PLAN OF CARE
Problem: Adult Inpatient Plan of Care  Goal: Plan of Care Review  Outcome: Ongoing, Progressing  Flowsheets (Taken 9/3/2022 0327)  Progress: improving  Plan of Care Reviewed With: patient  Goal: Patient-Specific Goal (Individualized)  Outcome: Ongoing, Progressing  Goal: Absence of Hospital-Acquired Illness or Injury  Outcome: Ongoing, Progressing  Intervention: Identify and Manage Fall Risk  Recent Flowsheet Documentation  Taken 9/3/2022 0200 by Katina Aguilar RN  Safety Promotion/Fall Prevention:   safety round/check completed   fall prevention program maintained  Taken 9/3/2022 0000 by Katina Aguilar RN  Safety Promotion/Fall Prevention:   fall prevention program maintained   safety round/check completed  Taken 9/2/2022 2200 by Katina Aguilar RN  Safety Promotion/Fall Prevention:   fall prevention program maintained   safety round/check completed  Taken 9/2/2022 2018 by Katina Aguilar RN  Safety Promotion/Fall Prevention:   activity supervised   fall prevention program maintained   safety round/check completed  Intervention: Prevent Skin Injury  Recent Flowsheet Documentation  Taken 9/2/2022 2018 by Katina Aguilar RN  Body Position: position changed independently  Intervention: Prevent and Manage VTE (Venous Thromboembolism) Risk  Recent Flowsheet Documentation  Taken 9/3/2022 0200 by Katina Aguilar RN  Activity Management: activity adjusted per tolerance  Taken 9/2/2022 2200 by Katina Aguilar RN  Activity Management: activity adjusted per tolerance  Intervention: Prevent Infection  Recent Flowsheet Documentation  Taken 9/3/2022 0200 by Katina Aguilar RN  Infection Prevention:   single patient room provided   rest/sleep promoted  Taken 9/3/2022 0000 by Katina Aguilar RN  Infection Prevention:   rest/sleep promoted   single patient room provided  Taken 9/2/2022 2200 by Katina Aguilar RN  Infection Prevention:   rest/sleep promoted   single patient room provided  Taken 9/2/2022 2018 by Katina Aguilar RN  Infection  Prevention:   rest/sleep promoted   single patient room provided  Goal: Optimal Comfort and Wellbeing  Outcome: Ongoing, Progressing  Intervention: Monitor Pain and Promote Comfort  Recent Flowsheet Documentation  Taken 9/2/2022 2018 by Katina Aguilar RN  Pain Management Interventions: see MAR  Intervention: Provide Person-Centered Care  Recent Flowsheet Documentation  Taken 9/2/2022 2018 by Katina Aguilar RN  Trust Relationship/Rapport:   questions answered   care explained  Goal: Readiness for Transition of Care  Outcome: Ongoing, Progressing     Problem: Pain Acute  Goal: Acceptable Pain Control and Functional Ability  Outcome: Ongoing, Progressing  Intervention: Prevent or Manage Pain  Recent Flowsheet Documentation  Taken 9/3/2022 0200 by Katina Aguilar RN  Medication Review/Management: medications reviewed  Taken 9/3/2022 0000 by Katina Aguilar RN  Medication Review/Management: medications reviewed  Taken 9/2/2022 2200 by Katina Aguilar RN  Medication Review/Management: medications reviewed  Taken 9/2/2022 2018 by Katina Aguilar RN  Medication Review/Management: medications reviewed  Intervention: Develop Pain Management Plan  Recent Flowsheet Documentation  Taken 9/2/2022 2018 by Katina Aguilar RN  Pain Management Interventions: see MAR  Intervention: Optimize Psychosocial Wellbeing  Recent Flowsheet Documentation  Taken 9/2/2022 2018 by Katina Aguilar RN  Diversional Activities: television     Problem: Fall Injury Risk  Goal: Absence of Fall and Fall-Related Injury  Outcome: Ongoing, Progressing  Intervention: Identify and Manage Contributors  Recent Flowsheet Documentation  Taken 9/3/2022 0200 by Katina Aguilar RN  Medication Review/Management: medications reviewed  Taken 9/3/2022 0000 by Katina Aguilar RN  Medication Review/Management: medications reviewed  Taken 9/2/2022 2200 by Katina Aguilar RN  Medication Review/Management: medications reviewed  Taken 9/2/2022 2018 by Katina Aguilar, RN  Medication  Review/Management: medications reviewed  Intervention: Promote Injury-Free Environment  Recent Flowsheet Documentation  Taken 9/3/2022 0200 by Katina Aguilar, RN  Safety Promotion/Fall Prevention:   safety round/check completed   fall prevention program maintained  Taken 9/3/2022 0000 by Katina Aguilar, RN  Safety Promotion/Fall Prevention:   fall prevention program maintained   safety round/check completed  Taken 9/2/2022 2200 by Katina Aguilar, RN  Safety Promotion/Fall Prevention:   fall prevention program maintained   safety round/check completed  Taken 9/2/2022 2018 by Katina Aguilar, RN  Safety Promotion/Fall Prevention:   activity supervised   fall prevention program maintained   safety round/check completed   Goal Outcome Evaluation:  Plan of Care Reviewed With: patient        Progress: improving

## 2022-09-03 NOTE — OUTREACH NOTE
Prep Survey    Flowsheet Row Responses   [facility] patient discharged from? [place]   Is LACE score < 7 ? No   Emergency Room discharge w/ pulse ox? No   Eligibility [facility]   Date of Admission 08/31/22   Date of Discharge 09/03/22   Discharge Disposition Home-Health Care [facility]   Discharge diagnosis Chronic distal aortic occlusion - aortic stent placement with bilateral common and external iliac artery stents   Does the patient have one of the following disease processes/diagnoses(primary or secondary)? General Surgery   Does the patient have Home health ordered? Yes   What is the Home health agency?  [facility]    Is there a DME ordered? No   Prep survey completed? Yes          LUCAS LINDSEY - Registered Nurse

## 2022-09-03 NOTE — PROGRESS NOTES
"   LOS: 3 days   Patient Care Team:  Alexi Bronson PA as PCP - General (Physician Assistant)  Maximino Jernigan MD as Consulting Physician (Cardiology)  Jarad Andrade MD as Surgeon (Cardiothoracic Surgery)  Gabriel Bauer DO as Consulting Physician (Pulmonary Disease)  Speedy Osman MD (Dermatology)  Nii Nayak MD as Consulting Physician (Orthopedic Surgery)    Chief Complaint: leg pain    Subjective     History of Present Illness    Subjective:  Symptoms:  She reports weakness.  No shortness of breath or chest pain.  (Vague RIGHT hip pain which radiates to thigh region.  Ambulating better.  Does NOT want to go to rehab; too depressing.  Per RN, able to transfer multiple times out of bed.  Per Pt, she has help at home.).    Pain:  She complains of pain that is mild.  She reports pain is improving.  Pain is partially controlled.        History taken from: patient    Objective     Vital Signs  Temp:  [97.6 °F (36.4 °C)-98.1 °F (36.7 °C)] 97.6 °F (36.4 °C)  Heart Rate:  [79-99] 87  Resp:  [18-20] 18  BP: (105-127)/(53-74) 105/53    Objective:  General Appearance:  Comfortable.    Vital signs: (most recent): Blood pressure 105/53, pulse 87, temperature 97.6 °F (36.4 °C), temperature source Oral, resp. rate 18, height 152.4 cm (60\"), weight 39 kg (86 lb), SpO2 97 %, not currently breastfeeding.  Vital signs are normal.    HEENT: Normal HEENT exam.    Lungs:  Normal effort and normal respiratory rate.    Heart: Normal rate.    Pulses: There are no decreased pulses.  (2+ DP BILATERALLY)    Neurological: Patient is alert and oriented to person, place and time.    Skin:  Warm.              Results Review:     I reviewed the patient's new clinical results.    Medication Review: Yes    Assessment & Plan       Chronic distal aortic occlusion (HCC)      Assessment & Plan  Chronic aortic occlusion s/p BILATERAL [place]ERETT/EIA stents 8/31  - ambulate  - home today if stable on her feet  - F/U [facility] " 9/13/22 @ 0900 with in-office RASHI  - [facility] [place] 7.5 #    Waldo Rojas MD  09/03/22  13:00 EDT    Time: Discharge 5 min

## 2022-09-05 NOTE — OUTREACH NOTE
Call Center TCM Note    Flowsheet Row Responses   [facility] patient discharged from? [place]   Does the patient have one of the following disease processes/diagnoses(primary or secondary)? General Surgery   TCM attempt successful? Yes   Call start time 1408   Call end time 1411   Discharge diagnosis Chronic distal aortic occlusion - aortic stent placement with bilateral common and external iliac artery stents   Meds reviewed with patient/caregiver? Yes   Is the patient having any side effects they believe may be caused by any medication additions or changes? No   Does the patient have all medications related to this admission filled (includes all antibiotics, pain medications, etc.) Yes   Is the patient taking all medications as directed (includes completed medication regime)? Yes   Does the patient have a follow up appointment scheduled with their surgeon? Yes   Comments 9/7 @ 12:00 with ANGEL LUIS Donovan   What is the Home health agency?  [facility]    Has home health visited the patient within 72 hours of discharge? Call prior to 72 hours   Psychosocial issues? No   Did the patient receive a copy of their discharge instructions? Yes   Nursing interventions Reviewed instructions with patient   What is the patient's perception of their health status since discharge? Improving   Nursing interventions Nurse provided patient education   Is the patient /caregiver able to teach back basic post-op care? Practice 'cough and deep breath', Drive as instructed by MD in discharge instructions, No tub bath, swimming, or hot tub until instructed by MD, Keep incision areas clean,dry and protected, Lifting as instructed by MD in discharge instructions   Is the patient/caregiver able to teach back signs and symptoms of incisional infection? Increased redness, swelling or pain at the incisonal site, Increased drainage or bleeding, Incisional warmth, Pus or odor from incision, Fever   Is the patient/caregiver able to  teach back steps to recovery at home? Set small, achievable goals for return to baseline health, Rest and rebuild strength, gradually increase activity, Eat a well-balance diet, Make a list of questions for surgeon's appointment   If the patient is a current smoker, are they able to teach back resources for cessation? Smoking cessation medications  [One or two a day]   Is the patient/caregiver able to teach back the hierarchy of who to call/visit for symptoms/problems? PCP, Specialist, Home health nurse, Urgent Care, ED, 911 Yes   Additional teach back comments Sates she is doing well.    TCM call completed? Yes   Wrap up additional comments Denies questions or needs at this time.          Hyun Reynolds LPN    9/5/2022, 14:12 EDT

## 2022-09-05 NOTE — OUTREACH NOTE
Call Center TCM Note    Flowsheet Row Responses   [facility] patient discharged from? [place]   Does the patient have one of the following disease processes/diagnoses(primary or secondary)? General Surgery   TCM attempt successful? No   Unsuccessful attempts Attempt 1          Hyun Reynolds LPN    9/5/2022, 11:24 EDT

## 2022-09-06 NOTE — DISCHARGE SUMMARY
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        Hospital Course  Patient is a 82 y.o. female presented with Infrarenal aortic occlusion with ischemia bilateral lower extremities.  He was admitted under the services of Dr. Ronak Urbina. After informed consent was given, the patient was taken to the operating room where he underwent an AORTAGRAM WITH RUNOFF, BILATERAL COMMON ILIAC ARTERY STENTS, BILATERAL EXTERNAL ILIAC ARTERY STENTS, and AORTIC STENT. Post procedure, he was admitted to telemetry floor for monitoring and pain management. His discharge was delayed secondary to inability to ambulate with PT. He was recommended to d/c to rehab facility but refused. She eventually was able to ambulate without assistance and was stable on her feet. She was then ready for d/c home.Pain was controlled.    Procedures Performed    Procedure(s):  AORTAGRAM WITH RUNOFF BILATERAL ILIAC ARTERY INTERVENTION STENTS  -------------------       Consults:   Consults     No orders found from 8/2/2022 to 9/1/2022.          Pertinent Test Results:   CBC    Results from last 7 days   Lab Units 09/03/22  1016 09/01/22  1027   WBC 10*3/mm3 6.62 5.02   HEMOGLOBIN g/dL 8.6* 8.8*   PLATELETS 10*3/mm3 210 243     BMP   Results from last 7 days   Lab Units 09/03/22  1016 09/01/22  1027   SODIUM mmol/L 135* 135*   POTASSIUM mmol/L 3.5 3.7   CHLORIDE mmol/L 100 101   CO2 mmol/L 26.0 25.0   BUN mg/dL 9 11   CREATININE mg/dL 0.42* 0.45*   GLUCOSE mg/dL 157* 88        Condition on Discharge:  Stable    Vital Signs    Temp:  [97.6 °F (36.4 °C)-98.1 °F (36.7 °C)] 97.6 °F (36.4 °C)  Heart Rate:  [79-99] 87  Resp:  [18-20] 18  BP: (105-127)/(53-74)  "105/53    Physical Exam:  (performed by Dr. Rojas on 9/3/22)  General Appearance:  Comfortable.    Vital signs: (most recent): Blood pressure 105/53, pulse 87, temperature 97.6 °F (36.4 °C), temperature source Oral, resp. rate 18, height 152.4 cm (60\"), weight 39 kg (86 lb), SpO2 97 %, not currently breastfeeding.  Vital signs are normal.    HEENT: Normal HEENT exam.    Lungs:  Normal effort and normal respiratory rate.    Heart: Normal rate.    Pulses: There are no decreased pulses.  (2+ DP BILATERALLY)    Neurological: Patient is alert and oriented to person, place and time.    Skin:  Warm.      Discharge Disposition  Home or Self Care    Discharge Medications     Discharge Medications      New Medications      Instructions Start Date   clopidogrel 75 MG tablet  Commonly known as: Plavix   75 mg, Oral, Daily      HYDROcodone-acetaminophen 5-325 MG per tablet  Commonly known as: NORCO   1 tablet, Oral, Every 6 Hours PRN         Continue These Medications      Instructions Start Date   aspirin 81 MG EC tablet   81 mg, Oral, Daily      atorvastatin 40 MG tablet  Commonly known as: LIPITOR   40 mg, Oral, Daily      DULoxetine 60 MG capsule  Commonly known as: CYMBALTA   60 mg, Oral, Daily      Fluticasone-Umeclidin-Vilant 100-62.5-25 MCG/INH inhaler  Commonly known as: TRELEGY   1 puff, Inhalation, Daily      loratadine 10 MG tablet  Commonly known as: CLARITIN   10 mg, Oral, As Needed      MILK THISTLE PO   1 capsule, Oral, Daily      traZODone 150 MG tablet  Commonly known as: DESYREL   150 mg, Oral, Nightly      VITAMIN B 12 PO   1 capsule, Oral, Daily         Stop These Medications    cilostazol 50 MG tablet  Commonly known as: PLETAL            Discharge Diet:  Resume normal diet     Activity at Discharge:   - No heavy lifting over 10 pounds  - May resume normal diet  - May shower, but keep operative site clean and dry  - No driving until 24 hours after pain medication    Follow-up Appointments  Future " Appointments   Date Time Provider Department Center   9/7/2022 12:00 PM Caro Bronson PA [facility]   10/12/2022 11:30 AM Swathi Guzman APRN [facility]   8/2/2023  9:45 AM Maximino Jernigan MD [facility]     Additional Instructions for the Follow-ups that You Need to Schedule     Ambulatory Referral to Home Health   As directed      Face to Face Visit Date: 9/2/2022    Follow-up provider for Plan of Care?: I treated the patient in an acute care facility and will not continue treatment after discharge.    Follow-up provider: CARO BRONSON [[id number]]    Reason/Clinical Findings: impaired endurance,    Describe mobility limitations that make leaving home difficult: weakness fatigue    Nursing/Therapeutic Services Requested: Skilled Nursing Physical Therapy Occupational Therapy    Skilled nursing orders: Cardiopulmonary assessments    PT orders: Therapeutic exercise Gait Training Transfer training Strengthening Home safety assessment    Weight Bearing Status: As Tolerated    Occupational orders: Energy conservation Strengthening Home safety assessment    Frequency: 1 Week 1                      Ashley Hauser PA-C  09/06/22  15:12 EDT

## 2022-09-07 NOTE — PROGRESS NOTES
"Transitional Care Follow Up Visit  Subjective     Shirley Calhoun is a 82 y.o. female who presents for a transitional care management visit.    Within 48 business hours after discharge our office contacted her via telephone to coordinate her care and needs.      I reviewed and discussed the details of that call along with the discharge summary, hospital problems, inpatient lab results, inpatient diagnostic studies, and consultation reports with Shirley.     Current outpatient and discharge medications have been reconciled for the patient.  Reviewed by: ANGEL LUIS Donovan      Date of TCM Phone Call 9/3/2022   [facility]   Date of Admission 8/31/2022   Date of Discharge 9/3/2022   Risk for Readmission (LACE Score) -   Discharge Disposition Home-Health Care [facility]     Risk for Readmission (LACE) Score: 11 (9/3/2022  6:01 AM)      History of Present Illness   Course During Hospital Stay:     \"Patient is a 82 y.o. female presented with Infrarenal aortic occlusion with ischemia bilateral lower extremities.  she was admitted under the services of Dr. Ronak Urbina. After informed consent was given, the patient was taken to the operating room where he underwent an AORTAGRAM WITH RUNOFF, BILATERAL COMMON ILIAC ARTERY STENTS, BILATERAL EXTERNAL ILIAC ARTERY STENTS, and AORTIC STENT. Post procedure, she was admitted to telemetry floor for monitoring and pain management. Her discharge was delayed secondary to inability to ambulate with PT. she was recommended to d/c to rehab facility but refused. She eventually was able to ambulate without assistance and was stable on her feet. She was then ready for d/c home.Pain was controlled.\"     CC today of pain in both legs along with bruising along inner groin. Ambulating in wheelchair. Notes it hurts to walk   Home health has visited the house once do far   Pt has not contacted her surgeon to discuss. Notes she is out of pain management Dr. Urbina provided. F/u with " "surgeon's office next week      Has put on 5 lbs since last visit. Has been drinking 3 shakes a day and eating more calories   Trying to increase waster intake     Some constipation. Taking metamucil     Has cut back on smoking but continues to smoke since surgery   Pt has voiced before that she has no desire to completely quit smoking     Has osteoporosis. Having worsening low back pain. Would like to evaluate further. Hx of hip replacement surgery 3-4 years ago (R hip)     The following portions of the patient's history were reviewed and updated as appropriate: allergies, current medications, past family history, past medical history, past social history, past surgical history and problem list.    Review of Systems  As noted per HPI     Objective    Blood pressure 122/48, pulse 96, temperature 98 °F (36.7 °C), resp. rate 16, height 152.4 cm (60\"), weight 41.3 kg (91 lb), SpO2 96 %, not currently breastfeeding.     Physical Exam  Cardiovascular:      Rate and Rhythm: Normal rate and regular rhythm.   Pulmonary:      Effort: Pulmonary effort is normal. No respiratory distress.      Breath sounds: Normal breath sounds. No wheezing or rhonchi.   Musculoskeletal:      Comments: Ambulating in wheelchair    Skin:     Comments: Significant ecchymosis of inner thighs/ groin bilaterally. No palpable hematoma    Neurological:      Mental Status: She is alert and oriented to person, place, and time.   Psychiatric:         Mood and Affect: Mood normal.         Behavior: Behavior normal.         Thought Content: Thought content normal.         Judgment: Judgment normal.         Assessment & Plan   Diagnoses and all orders for this visit:    1. PAD (peripheral artery disease) (HCC) (Primary)    2. Bilateral leg pain    3. Hyponatremia  -     Basic metabolic panel    4. Chronic midline low back pain without sciatica  -     XR Spine Lumbar Complete 4+VW    recheck BMP today. Sodium a little low in hospital   Pt directed to follow " up with surgeon's office about ongoing pain and request for refill on pain medication. Reassurance provided this discomfort should improve with time and that ultimately goal is to keep her mobile and help with claudication pain   XR of low back ordered for additional evaluation

## 2022-09-07 NOTE — TELEPHONE ENCOUNTER
Caller: Shirley Calhoun    Relationship: Self    Best call back number:   [phone]    Who are you requesting to speak with (clinical staff, provider,  specific staff member): CARO LEÓN     What was the call regarding: PAIN MEDICATION     Do you require a callback:  YES

## 2022-09-07 NOTE — HOME HEALTH
SOC Note:    Home Health ordered for: disciplines Encounter for surgical aftercare following surgery on the circulatory system,  Respiratory/Cardiac assessment by /SN.  Desease process teaching, and medication management.    Reason for Hosp/Primary Dx/Co-morbidities: Encounter for surgical aftercare following surgery on the circulatory system    Focus of Care:  Respiratory/Cardiac assessment by /SN.  Desease process teaching, and medication management.      Current Functional status/mobility/DME: Ambulate through apartment holdingonto furniture, refuses to use walker.    HB status/Living Arrangements: Has parttime caregiver, neighbor, friend.   Patient heavy smoker, for many years, denies any SOB.      Skin Integrity/wound status: Multiple bruised sites, small incision site intact, healing.    Code Status: fullcode    Fall Risk: High    POC confirmed with Patient, 9-5-22

## 2022-09-08 NOTE — TELEPHONE ENCOUNTER
PT WAS TOLD TO TAKE A LOT OF IBUPROFEN FROM HER SURGEONS OFFICE AND IT IS NOT WORKING SHE WANTED TO KNOW IF SOMETHING COULD BE SENT IN FOR HER PLEASE ADVISE.

## 2022-09-08 NOTE — TELEPHONE ENCOUNTER
Let patient know Dr. Romero agreed to send in another short course of pain medication. We do not provide refills on this. YUE

## 2022-09-09 NOTE — TELEPHONE ENCOUNTER
PHONE CALL FROM NACHO @ [facility].  SHE NEEDS VERBAL ORDERS FOR EXTENSION OF PT AND OT THROUGH THE END OF NEXT WEEK.     PLEASE CALL [phone]

## 2022-09-13 NOTE — PROGRESS NOTES
Enter Query Response Below      Query Response:     Underweight, BMI 16.8         If applicable, please update the problem list.             Patient: Shirley Morales        : 1940  Account: [id number]           Admit Date: 2022        Options to Respond to Query:    1. Access the Encounter     a. From the To-Do Side bar, click Respond With Note.     b. Click New Note     c. Answer query within the yellow box.                d. Update the Problem List if applicable.     ,     82 year old female with nonhealing right lower extremity wound and occluded infrarenal aorta. BMI is noted as 16.8, dietician consult noted “underweight, inadequate protein intake….BMI classification: Underweight:<18.5kg/m2 , IBW 11 lbs…. non-significant 4% weight loss in 6 months.”  Patient refused NFPE for malnutrition, reports “never being a big person.”  Patient was continued on Boost Plus supplementation.  After study, please specify if this may describe:    *Underweight, BMI 16.8  *Normal body habitus  *Other___________  *Clinically undetermined    By submitting this query, we are merely seeking further clarification of documentation to accurately reflect all conditions that you are monitoring, evaluating, treating or that extend the hospitalization or utilize additional resources of care. Please utilize your independent clinical judgment when addressing the question(s) above.     This query and your response, once completed, will be entered into the legal medical record.    Sincerely,    Volodymyr Barron RN, BSN  Clinical Documentation Integrity  [facility]  [email or web address]

## 2022-09-14 NOTE — TELEPHONE ENCOUNTER
Caller: Shirley Calhoun    Relationship: Self    Best call back number: [phone]    What is the medical concern/diagnosis: BACK PAIN     What specialty or service is being requested: PAIN MANAGEMENT     What is the provider, practice or medical service name: WITHIN [facility]     What is the office location: [facility] IF POSSIBLE     What is the office phone number: UNSURE     Any additional details: PLEASE CALL THE PATIENT AND ADVISE

## 2022-09-14 NOTE — HOME HEALTH
Pt is an 81 yo female who lives alone in a handicapped accessible apt w/ friend/neighbor providing assist daily. Pt admitted to  w/ aortagram with runoff, bilateral common iliac artery stents, bilateral external iliac artery stents and aortic stent. Pt sitting on the couch upon arrival, reports to have had x-rays today and appt w/ surgeon tomorrow. Pt reports severe pain in R thigh/back area that is limiting her activity. Pt using heat, medication, positioning, smoker and alcohol to manage pain. Completed OT eval and POC, frequency of 2m2. Pt presents w/ decrease strength/endurance/balance and pain that limits pt's ADL/lt IADL skills, Pt will benefit from HHOT services for education/training of EC/WS principles aw/ ADL skills, therapeutic exercises/HEP, safety/fall prevention in the home, UTI prevention and pressure relief/skin protection.  Plan for next visit: EC/WS w/ ADL skills, est HEP

## 2022-09-15 NOTE — HOME HEALTH
PT Eval Note:   Home Health ordered for: SN, PT and OT  Reason for Hosp/Primary Dx/Co-morbidities: Encounter for surgical aftercare following surgery on the circulatory system   Focus of Care: HHPT 1wk4 for gait training, balance training, therapeutic exercise, pt education/HEP instruction  Current Functional status/mobility/DME: Pt with unsteady gait in home, has a 4WW but uses inconsistently  HB status/Living Arrangements: Pt lives alone, has a neighbor/friend that assists; pt is homebound due to taxing effort to safely leave home, unsteady gait  Skin Integrity/wound status: Multiple bruised sites, small incision site intact, healing.   Code Status: fullcode   Fall Risk: High

## 2022-09-21 NOTE — HOME HEALTH
"Focus of care: Patient agitated with nursing visit.  States she is \"doing fine\", and does not know why we keep coming to check on her.  Patient smoking through out visit, and drinking Vodka. Visit kept breif, Vitals WNL, and patient taking medications appropriately from medi set.  Patient stated she was tired and would be takig a nap."

## 2022-09-21 NOTE — HOME HEALTH
"Routine Visit Note: \"I told you not to come.\" Reminded pt that we talked last night and she agreed for me to come. Cg'er, Scarlett assures it's ok and pt agreed to OT session.     Skill/education provided: Pt/cg'ed ed on EC/WS w/ ADL skills, Pain management of LE,     Patient/caregiver response: Pt tolerated session well w/ decrease pain in R LE, increase endurance for walking into the kitchen area w/ RW/ HEP. home safety for increasing accessibility in home-access to kitchen area. Cg'er ed for shower/tub transfers.    Plan for next visit: Pt request waiting on OT until pain is under better control. OT is on vacation for 2 weeks and will f/u w/ patient at that time. Pt/cg'er agree.    Other pertinent info:"

## 2022-09-22 NOTE — CASE COMMUNICATION
Patient missed a  nursing visit 9/22/2022pt states she wont see anyone until she goes to   [facility]    For your records only.   As per home health protocol, MD must be notified of missed/cancelled visits; therefore the prescribed frequency was not met.

## 2022-09-28 NOTE — CASE COMMUNICATION
Patient missed a  nursingvisit from [facility] on 9/27/2022    Reason: patient refuses to see anyone until after her back doctor appt.      For your records only.   As per home health protocol, MD must be notified of missed/cancelled visits; therefore the prescribed frequency was not met.

## 2022-10-04 NOTE — CASE COMMUNICATION
Patient missed a  nursing visit from [facility] on 10/04/2022    Reason: patient reports she will not need our sevices as she will be going to out patient therapy       For your records only.   As per home health protocol, MD must be notified of missed/cancelled visits; therefore the prescribed frequency was not met.

## 2022-10-25 NOTE — PROGRESS NOTES
Chief Complaint   Patient presents with   • Hot Flashes     Pt states that for the last week on and off shes been getting really hot and sweats and then get cold. Pt states that shes taken a couple of covid test and they've all been negative        Subjective      Shirley Calhoun is a 82 y.o. who presents for 7 to 10 days of fevers, chills, malaise with mild increased cough that is nonproductive.  She has had multiple negative COVID tests.  Patient has underlying COPD but denies any dyspnea above baseline.  Patient also has peripheral arterial disease and while she has had some mild trauma to the lower extremities denies any extremity pain or signs of cellulitis.  She did have some diarrhea approximately a week ago.  She denies dysuria.  Patient claims she has had similar symptoms in the past usually associated with change in the weather and her prior physician many many years ago in [place] would give her steroids.    Objective   Vital Signs:  /70   Pulse 88   Temp 99.1 °F (37.3 °C) (Temporal)   Wt 38.9 kg (85 lb 12.8 oz)   SpO2 99%   BMI 16.76 kg/m²     Physical Exam  Constitutional:       Appearance: Normal appearance.      Comments: Mild weak cough while in office   HENT:      Head: Normocephalic and atraumatic.      Right Ear: Tympanic membrane and ear canal normal.      Left Ear: Tympanic membrane and ear canal normal.      Nose: Nose normal.      Mouth/Throat:      Mouth: Mucous membranes are moist.      Pharynx: Oropharynx is clear.   Eyes:      Conjunctiva/sclera: Conjunctivae normal.   Cardiovascular:      Rate and Rhythm: Normal rate and regular rhythm.      Heart sounds: Normal heart sounds. No murmur heard.  Pulmonary:      Effort: Pulmonary effort is normal. No respiratory distress.      Comments: Breath sounds are distant  Abdominal:      General: Abdomen is flat. Bowel sounds are normal. There is no distension.      Palpations: Abdomen is soft.      Tenderness: There is no abdominal  tenderness.   Musculoskeletal:      Cervical back: Normal range of motion and neck supple. No tenderness.      Comments: Lower extremities with some bruising but no signs of cellulitis   Lymphadenopathy:      Cervical: No cervical adenopathy.   Skin:     General: Skin is warm and dry.   Neurological:      Mental Status: She is alert.   Psychiatric:         Mood and Affect: Mood normal.          Result Review                     Assessment and Plan  Diagnoses and all orders for this visit:    1. Acute cough (Primary)  -     CBC Auto Differential  -     XR Chest PA & Lateral    2. Fever, unspecified fever cause  -     CBC Auto Differential  -     Comprehensive Metabolic Panel  -     XR Chest PA & Lateral    3. Loss of appetite  -     CBC Auto Differential  -     Comprehensive Metabolic Panel    4. Panlobular emphysema (HCC)    5. PAD (peripheral artery disease) (HCC)    CBC and CMP have been ordered along with a chest x-ray.  Chest x-ray subsequently showed a new 1.5 cm right upper lobe nodule.  Patient was contacted with this information.  She will be placed on amoxicillin 500 mg 3 times daily that would treat the possibility of this abnormal x-ray being a pneumonia but due to smoking history and symptoms she presented with it is more likely this is malignant.  CT scan of the chest will be arranged      Follow Up  No follow-ups on file.  Patient was given instructions and counseling regarding her condition or for health maintenance advice. Please see specific information pulled into the AVS if appropriate.

## 2022-11-10 NOTE — TELEPHONE ENCOUNTER
Caller: Scarlett Mejía    Relationship: Emergency Contact    Best call back number:      What test was performed: CT SCAN     When was the test performed: 11/9/22      Additional notes:     CAREGIVER (SCARLETT ON  [facility]) IS WITH PATIENT NOW AND NEEDS TO KNOW CT RESULTS IN CASE PATIENT HAS QUESTIONS

## 2022-11-17 PROBLEM — J44.1 COPD EXACERBATION (HCC): Status: RESOLVED | Noted: 2018-06-28 | Resolved: 2022-01-01

## 2022-11-17 PROBLEM — R91.1 LUNG NODULE: Status: ACTIVE | Noted: 2022-01-01

## 2022-11-17 NOTE — PROGRESS NOTES
Follow Up Office Note       Patient Name: Shirley Calhoun    Referring Physician: Mohan Lehman, *    Chief Complaint:    Chief Complaint   Patient presents with   • Emphysema   • Follow-up       History of Present Illness: Shirley Calhoun is a 82 y.o. female who is here today to follow-up care with Pulmonary.  Patient has a past medical history for COPD, tobacco abuse with a 90 pack/year history, peripheral vascular disease, traumatic subdural hematoma, small bowel obstruction, and total hip replacement.  Patient states that she has been doing fairly well.  But she had a CT scan of her chest done recently after she had a chest x-ray which showed a spot on her lung.  CT scan showed a 2.4 cm right upper lobe nodule with mediastinal and hilar lymphadenopathy.  She states that she been having some fevers and night sweats which is what prompted the chest x-ray in the first place.  Solids been low-grade.  She is also difficulty maintaining weight on this is a chronic problem for her.  She denies any chest pain, nausea, fever, or chills.  She recently had some stents placed in her leg by Dr. Urbina in September and is currently on Plavix.  Otherwise continues on Trelegy and has albuterol that she uses on a as needed basis.    Review of Systems:   Review of Systems   Constitutional: Positive for fever. Negative for activity change, appetite change, chills and diaphoresis.   HENT: Negative for congestion, postnasal drip, sinus pressure and voice change.    Eyes: Negative for blurred vision.   Respiratory: Negative for cough, shortness of breath and wheezing.    Cardiovascular: Negative for chest pain.   Gastrointestinal: Negative for abdominal pain.   Musculoskeletal: Negative for myalgias.   Skin: Negative for color change and dry skin.   Allergic/Immunologic: Negative for environmental allergies.   Neurological: Negative for weakness and confusion.   Hematological: Negative for adenopathy.   Psychiatric/Behavioral:  "Negative for sleep disturbance and depressed mood.       The following portions of the patient's history were reviewed and updated as appropriate: allergies, current medications, past family history, past medical history, past social history, past surgical history and problem list.    Physical Exam:  Vital Signs:   Vitals:    11/17/22 0951   BP: 122/78   Pulse: 84   Temp: 97.6 °F (36.4 °C)   SpO2: 97%  Comment: resting, room air   Weight: Comment: pt in wheelchair   Height: 152.4 cm (60\")       Physical Exam  Vitals and nursing note reviewed.   Constitutional:       General: She is not in acute distress.     Appearance: She is well-developed and normal weight. She is not ill-appearing or toxic-appearing.   HENT:      Head: Normocephalic and atraumatic.   Cardiovascular:      Rate and Rhythm: Normal rate and regular rhythm.      Pulses: Normal pulses.      Heart sounds: Normal heart sounds. No murmur heard.    No friction rub. No gallop.   Pulmonary:      Effort: Pulmonary effort is normal. No respiratory distress.      Breath sounds: No wheezing, rhonchi or rales.      Comments: Diminished breath sounds bilaterally  Musculoskeletal:      Right lower leg: No edema.      Left lower leg: No edema.   Skin:     General: Skin is warm and dry.   Neurological:      Mental Status: She is alert and oriented to person, place, and time.         Immunization History   Administered Date(s) Administered   • COVID-19 (YULIANA) 03/12/2021   • COVID-19 (MODERNA) BOOSTER 05/16/2022   • COVID-19 (PFIZER) PURPLE CAP 12/20/2021   • FluMist 2-49yrs 12/09/2015   • Fluad Quad 65+ 09/23/2020   • Fluzone High Dose =>65 Years ([employer] ONLY) 12/09/2015   • Fluzone High-Dose 65+yrs 10/13/2021   • Pneumococcal Conjugate 13-Valent (PCV13) 05/15/2018   • Pneumococcal Polysaccharide (PPSV23) 03/15/2005       Results Review:   - I personally reviewed the pts imaging from  CT scan of the chest from 11/9/2022 showed a 24 x 12 mm right upper lobe " noncalcified spiculated nodule, with some mediastinal and hilar lymphadenopathy although the adenopathy does have calcifications do it, there is bilateral upper lobe emphysematous disease.  Also noted was a 7 mm pleural-based right upper lobe nodule as well and other small apical pulmonary nodules roughly 1 cm or less.   - chest x-ray 12/19/2019 showed diffuse bilateral emphysematous changes, with no acute cardiopulmonary process  -I personally viewed the patient's pulmonary function testing from 11/17/2020 which showed moderate obstruction without restriction significant air trapping and a normal DLCO.   - PFT from 12/19/2019 showed moderate obstruction without restriction, DLCO could not be calculated secondary to patient effort.  With an FEV1 of 78%.    Assessment / Plan:   Diagnoses and all orders for this visit:    1. 2.4 cm right upper lobe noncalcified nodule (11/2022) (Primary)  -I explained to the patient that unfortunately the right upper lobe nodule is very suspicious for cancer.  The fact that she has the lymphadenopathy may be associated and if the lymph nodes are cancer then unfortunately this would make her a stage III cancer and the only options would be chemotherapy, radiation, or immunotherapy.  If though the lymph nodes are negative and the right upper lobe lesion is positive then she may be able to get CyberKnife.  I do not think she be a great candidate for surgery given her overall debilitation I think should have a very difficult time healing.  -I discussed with her that we would ultimately do a navigational bronchoscopy and endobronchial ultrasound for the diagnosis.  She is currently on Plavix but I have discussed the case with Dr. Urbina's team and he said it would be okay for her to stop Plavix for 5 days prior to the procedure  -She has decided to pursue the biopsy but is more with family and give us a final time when she wants to do it.  -I discussed the risk and benefits of the procedure  with the patient, this includes but is not limited to hoarseness/vocal cord damage, pain, infection, cough, bleeding, pneumothorax, and anesthesia complications.  Patient verbalized understanding of this and agreed to proceed.    2. Panlobular emphysema (HCC)  -Patient has gold stage 3 class C COPD  -Continue Trelegy and albuterol for medication management  -latest PFTs as noted above  -Patient counseled on monitoring for COPD exacerbation and treatment plan  -Referral to pulmonary rehab    3. Tobacco abuse  -Continue to recommend tobacco cessation, patient is not interested in quitting..    Level of service justified based on 49 minutes spent in patient care on this date of service including, but not limited to: preparing to see the patient, obtaining and/or reviewing history, performing medically appropriate examination, ordering tests/medicine/procedures, independently interpreting results, documenting clinical information in EHR, and counseling/education of patient/family/caregiver (Excluding time spent on other separate services such as performing procedures or test interpretation, if applicable). (Level 4 30-39 minutes; Level 5 40-54 minutes)    Follow Up:   Return in about 6 weeks (around 12/29/2022).       LUZ Bauer DO  Pulmonary and Critical Care Medicine  Note Electronically Signed    Part of this note may be an electronic transcription/translation of spoken language to printed text using the Dragon Dictation System.

## 2022-11-22 NOTE — PROGRESS NOTES
"Chief Complaint   Patient presents with   • Neck Pain     Left side        Subjective      Shirley Calhoun is a 82 y.o. who presents for left-sided neck pain in the trapezius area.  She is accompanied by her caretaker who believes the pain started after the patient wore a heavy necklace.  Pain responds mildly to Tylenol.  She is not taking any oral NSAIDs due to concurrent use of Plavix.    Objective   Vital Signs:  /64   Pulse 91   Temp 98.4 °F (36.9 °C)   Resp 18   Ht 152.4 cm (60\")   Wt 40 kg (88 lb 3.2 oz)   SpO2 98%   BMI 17.23 kg/m²     Physical Exam  Musculoskeletal:      Cervical back: Tenderness present. No swelling, edema, deformity, erythema or bony tenderness. No pain with movement. Normal range of motion.      Comments: Cervical range of motion is symmetric.  There is no C-spine tenderness.  No significant pain with movement.  She has mild tenderness of the left trapezius muscle.  There is no rash   Neurological:      Mental Status: She is alert.          Result Review                     Assessment and Plan  Diagnoses and all orders for this visit:    1. Strain of left trapezius muscle, initial encounter (Primary)  Comments:  Use topical NSAIDs plus local heat.  May continue to use Tylenol as needed    Other orders  -     Diclofenac Sodium (VOLTAREN) 1 % gel gel; Apply 4 g topically to the appropriate area as directed 4 (Four) Times a Day.  Dispense: 100 g; Refill: 1            Follow Up  No follow-ups on file.  Patient was given instructions and counseling regarding her condition or for health maintenance advice. Please see specific information pulled into the AVS if appropriate.       "

## 2022-12-07 NOTE — ANESTHESIA PREPROCEDURE EVALUATION
Anesthesia Evaluation     Patient summary reviewed and Nursing notes reviewed   no history of anesthetic complications:  NPO Solid Status: > 8 hours  NPO Liquid Status: > 2 hours           Airway   Mallampati: II  TM distance: >3 FB  Neck ROM: full  No difficulty expected  Dental    (+) edentulous    Pulmonary - normal exam   (+) a smoker, COPD,     ROS comment: CT scan showed a 2.4 cm right upper lobe nodule with mediastinal and hilar lymphadenopathy  Cardiovascular - normal exam    ECG reviewed  PT is on anticoagulation therapy    (+) PVD,       Neuro/Psych    ROS Comment: traumatic subdural hematoma  GI/Hepatic/Renal/Endo    (+)  GERD,      Musculoskeletal     Abdominal    Substance History      OB/GYN          Other   arthritis,    history of cancer                    Anesthesia Plan    ASA 3     general     (ETT)  intravenous induction     Anesthetic plan, risks, benefits, and alternatives have been provided, discussed and informed consent has been obtained with: patient.    Plan discussed with CRNA.        CODE STATUS:

## 2022-12-07 NOTE — ANESTHESIA PROCEDURE NOTES
Airway  Urgency: elective    Date/Time: 12/7/2022 10:39 AM  Airway not difficult    General Information and Staff    Patient location during procedure: OR  CRNA/CAA: Mohan Gamble CRNA    Indications and Patient Condition  Indications for airway management: airway protection    Preoxygenated: yes  MILS not maintained throughout  Mask difficulty assessment: 2 - vent by mask + OA or adjuvant +/- NMBA    Final Airway Details  Final airway type: endotracheal airway      Successful airway: ETT  Cuffed: yes   Successful intubation technique: direct laryngoscopy  Endotracheal tube insertion site: oral  Blade: Светлана  Blade size: 3  ETT size (mm): 8.5  Cormack-Lehane Classification: grade I - full view of glottis  Placement verified by: chest auscultation and capnometry   Measured from: lips  ETT/EBT  to lips (cm): 19  Number of attempts at approach: 1  Assessment: lips, teeth, and gum same as pre-op and atraumatic intubation    Additional Comments  Negative epigastric sounds, Breath sound equal bilaterally with symmetric chest rise and fall

## 2022-12-07 NOTE — ANESTHESIA POSTPROCEDURE EVALUATION
Patient: Shirley Calhoun    Procedure Summary     Date: 12/07/22 Room / Location:  [facility]    Anesthesia Start: 1029 Anesthesia Stop: 1150    Procedure: BRONCHOSCOPY WITH ENDOBRONCHIAL ULTRASOUND AND NAVIGATION ([place]) Diagnosis:       Lung nodule      (Lung nodule [R91.1])    Surgeons: Gabriel Bauer DO Provider: Eugene Lowery Jr., MD    Anesthesia Type: general ASA Status: 3          Anesthesia Type: general    Vitals  No vitals data found for the desired time range.    /74  t 97f  rr 22  Hr 96 sr  spo2 91% 2l  nc      Post Anesthesia Care and Evaluation    Patient location during evaluation: PACU  Patient participation: complete - patient participated  Level of consciousness: awake and alert  Pain management: adequate    Airway patency: patent  Anesthetic complications: No anesthetic complications  PONV Status: none  Cardiovascular status: hemodynamically stable and acceptable  Respiratory status: nonlabored ventilation, acceptable and nasal cannula  Hydration status: acceptable

## 2022-12-09 NOTE — PROGRESS NOTES
Called the patient and discussed with him that all of her biopsy showed no signs of malignancy.  The positioning to actually get a proper biopsy of the lung nodule was difficult.  Given this I do want to get a PET scan of the patient if the PET scan is positive then I would likely still janusz pursue CyberKnife.  If the PET scan is negative though then we will monitor with serial CT scans.  Patient was agreeable.  I have gone ahead and ordered the PET scan.  She has follow-up with me in January.

## 2022-12-12 NOTE — PROGRESS NOTES
Obtained PET appointment. Called caregiver with PET date, time, location and instructions. She v/u and agreeable to plan. She verified follow-up with Dr. Bauer. She knows to call with questions or concerns.

## 2022-12-13 NOTE — TELEPHONE ENCOUNTER
Caller: Scarlett Mejía    Relationship to patient: Emergency Contact    Best call back number:   [phone]      Date of exposure: 12.03.22    Date of positive COVID19 test:12.12.22    COVID19 symptoms:  FATIGUE, FEVER, COUGH, AND HEAD CONGESTION  Date of initial quarantine: 12.03.22    Additional information or concerns: WHAT TO DO FOR HER SYMPTOMS    What is the patients preferred pharmacy:    [facility]  TELEPHONE  [phone]

## 2022-12-14 NOTE — TELEPHONE ENCOUNTER
PHONE CALL FROM [facility].  WILL DR BEYER CONTINUE TO BE THE SIGNING PHYSICIAN FOR THIS PATIENT.      PLEASE CALL CHELSIE @ [phone]

## 2023-01-03 ENCOUNTER — APPOINTMENT (OUTPATIENT)
Dept: PET IMAGING | Facility: HOSPITAL | Age: 83
End: 2023-01-03

## 2023-01-18 LAB
FUNGUS WND CULT: NORMAL
MYCOBACTERIUM SPEC CULT: NORMAL
NIGHT BLUE STAIN TISS: NORMAL

## 2023-01-26 DIAGNOSIS — F51.01 PRIMARY INSOMNIA: ICD-10-CM

## 2023-01-26 RX ORDER — TRAZODONE HYDROCHLORIDE 150 MG/1
150 TABLET ORAL NIGHTLY
Qty: 90 TABLET | Refills: 1 | OUTPATIENT
Start: 2023-01-26

## (undated) DEVICE — SNAP KOVER: Brand: UNBRANDED

## (undated) DEVICE — SYS SKIN CLS DERMABOND PRINEO W/22CM MESH TP

## (undated) DEVICE — TRY EPID SFTY 18G 3.5IN 1T7680

## (undated) DEVICE — GLV SURG BIOGEL LTX PF 7 1/2

## (undated) DEVICE — GW MICRO APPROACH CTO25 .014 300CM

## (undated) DEVICE — TBG INJ CONTRL EXCITE RA 1200PSI 48IN

## (undated) DEVICE — PINNACLE INTRODUCER SHEATH: Brand: PINNACLE

## (undated) DEVICE — PINNACLE R/O II INTRODUCER SHEATH WITH RADIOPAQUE MARKER: Brand: PINNACLE

## (undated) DEVICE — CANN NASL CO2 DIVIDED A/

## (undated) DEVICE — GLV SURG SENSICARE PI MIC PF SZ7.5 LF STRL

## (undated) DEVICE — FRCP BIOP SUPERDIMENSION PREMARK

## (undated) DEVICE — RADIFOCUS GLIDEWIRE ADVANTAGE TRACK GUIDE: Brand: GLIDEWIRE ADVANTAGE TRACK

## (undated) DEVICE — ENCORE® LATEX MICRO SIZE 8, STERILE LATEX POWDER-FREE SURGICAL GLOVE: Brand: ENCORE

## (undated) DEVICE — CVR HNDL LIGHT RIGID

## (undated) DEVICE — GLV SURG SIGNATURE TOUCH PF LTX 8 STRL BX/50

## (undated) DEVICE — ADAPT SWVL FIBROPTIC BRONCH

## (undated) DEVICE — SINGLE USE ASPIRATION NEEDLE: Brand: SINGLE USE ASPIRATION NEEDLE

## (undated) DEVICE — Device: Brand: BALLOON

## (undated) DEVICE — ANGIO-SEAL VIP VASCULAR CLOSURE DEVICE: Brand: ANGIO-SEAL

## (undated) DEVICE — RADIFOCUS GLIDEWIRE: Brand: GLIDEWIRE

## (undated) DEVICE — SHEATH STEER INTRO TOURGUIDE 6.5F 55CM 17MM

## (undated) DEVICE — ST EXT MICROBORE FIX M LL 38IN

## (undated) DEVICE — PERCLOSE™ PROSTYLE™ SUTURE-MEDIATED CLOSURE AND REPAIR SYSTEM: Brand: PERCLOSE™ PROSTYLE™

## (undated) DEVICE — GLIDEX™ COATED HYDROPHILIC GUIDEWIRE: Brand: MAGIC TORQUE™

## (undated) DEVICE — CATH GUIDE SOFTVU FLUSH HT PIG .035 4F 65CM

## (undated) DEVICE — CVR PROB ULTRASND/TRANSD W/GEL 18X120CM STRL

## (undated) DEVICE — CATH F4 INF JR 4 100CM: Brand: INFINITI

## (undated) DEVICE — CATH OMNI FLUSH 5FR

## (undated) DEVICE — COVER,LIGHT HANDLE,FLX,1/PK: Brand: MEDLINE INDUSTRIES, INC.

## (undated) DEVICE — SYR CONTRL LUERLOK 10CC

## (undated) DEVICE — ADHS SKIN PREMIERPRO EXOFIN TOPICAL HI/VISC .5ML

## (undated) DEVICE — ADAPT BRONCH ILLUMISITE FOR OLYMP

## (undated) DEVICE — SYR LL BD 10CC

## (undated) DEVICE — GLIDESHEATH BASIC HYDROPHILIC COATED INTRODUCER SHEATH: Brand: GLIDESHEATH

## (undated) DEVICE — AVANTI + 4F STD W/GW: Brand: AVANTI

## (undated) DEVICE — CATH DIAG EXPO M/ PK 6FR FL4/FR4 PIG 3PK

## (undated) DEVICE — MAJ-1414 SINGLE USE ADPATER BIOPSY VALV: Brand: SINGLE USE ADAPTOR BIOPSY VALVE

## (undated) DEVICE — INFLATION DEVICE: Brand: ENCORE™ 26

## (undated) DEVICE — UNDERGLV SURG BIOGEL INDICAT PI SZ8 BLU

## (undated) DEVICE — ST EXT IV SMARTSITE 2VLV SP M LL 5ML IV1

## (undated) DEVICE — KT INTRO MINISTICK MAX W/GW NITNL/TUNG ECHO STFF 4F 21G 7CM

## (undated) DEVICE — DEV COMP RAD PRELUDESYNC 24CM

## (undated) DEVICE — RADIFOCUS GLIDEWIRE ADVANTAGE GUIDEWIRE: Brand: GLIDEWIRE ADVANTAGE

## (undated) DEVICE — RADIFOCUS GLIDECATH: Brand: GLIDECATH

## (undated) DEVICE — PK ANGIO OR 10

## (undated) DEVICE — LUER-LOK 360°: Brand: CONNECTA, LUER-LOK

## (undated) DEVICE — COL SPUTUM SYS 50ML

## (undated) DEVICE — SINGLE USE SUCTION VALVE MAJ-209: Brand: SINGLE USE SUCTION VALVE (STERILE)

## (undated) DEVICE — ANTIBACTERIAL UNDYED BRAIDED (POLYGLACTIN 910), SYNTHETIC ABSORBABLE SUTURE: Brand: COATED VICRYL

## (undated) DEVICE — GW PERIPH GUIDERIGHT STD/EXCHNG/J/TIP SS 0.035IN 5X260CM

## (undated) DEVICE — BNDG ELAS CO-FLEX SLF ADHR 4IN5YD LF STRL

## (undated) DEVICE — BOWL UTIL STRL 32OZ

## (undated) DEVICE — PERIPHERAL SHIELD-ORANGE: Brand: RADPAD

## (undated) DEVICE — INTENDED USE FOR SURGICAL MARKING ON INTACT SKIN, ALSO PROVIDES A PERMANENT METHOD OF IDENTIFYING OBJECTS IN THE OPERATING ROOM: Brand: WRITESITE® REGULAR TIP SKIN MARKER

## (undated) DEVICE — SOL NACL 0.9PCT 1000ML

## (undated) DEVICE — NDL PULM SUPERDIMENSION ARCPOINT 21G

## (undated) DEVICE — TRAP,MUCUS SPECIMEN,40CC: Brand: MEDLINE

## (undated) DEVICE — SINGLE USE BIOPSY VALVE MAJ-210: Brand: SINGLE USE BIOPSY VALVE (STERILE)

## (undated) DEVICE — APPL CHLORAPREP 26ML CLR

## (undated) DEVICE — MODEL AT P65, P/N 701554-001KIT CONTENTS: HAND CONTROLLER, 3-WAY HIGH-PRESSURE STOPCOCK WITH ROTATING END AND PREMIUM HIGH-PRESSURE TUBING: Brand: ANGIOTOUCH® KIT

## (undated) DEVICE — KT PROC ENDOBRONC ILLUMISITE LOCAT/GUIDE EXT/WORK/CH 90DEG

## (undated) DEVICE — SYR LUERLOK 50ML

## (undated) DEVICE — CVR PROB ULTRASND/TRANSD W/GEL LNG 18X250CM STRL

## (undated) DEVICE — NDL PERC 1PRT THNWALL W/BASEPLT 18G 7CM

## (undated) DEVICE — MODEL BT2000 P/N 700287-012KIT CONTENTS: MANIFOLD WITH SALINE AND CONTRAST PORTS, SALINE TUBING WITH SPIKE AND HAND SYRINGE, TRANSDUCER: Brand: BT2000 AUTOMATED MANIFOLD KIT

## (undated) DEVICE — PK MAJ TOTL HIP ANT 10

## (undated) DEVICE — BRSH CYTO INREACH SHEATH 1.8X2MM 130CM

## (undated) DEVICE — SYRINGE, LOCKING, 10CC, STERILE: Brand: BABY GORILLA/GORILLA PLATING SYSTEM

## (undated) DEVICE — NDL HYPO ECLPS SFTY 22G 1 1/2IN

## (undated) DEVICE — CATH IV INSYTE AUTOGARD 18G 1 1/4IN GRN

## (undated) DEVICE — PK CATH CARD 10

## (undated) DEVICE — Device: Brand: SINGLE USE ASPIRATION NEEDLE NA-U401SX